# Patient Record
Sex: FEMALE | Race: WHITE | Employment: UNEMPLOYED | URBAN - METROPOLITAN AREA
[De-identification: names, ages, dates, MRNs, and addresses within clinical notes are randomized per-mention and may not be internally consistent; named-entity substitution may affect disease eponyms.]

---

## 2017-03-13 ENCOUNTER — OFFICE VISIT (OUTPATIENT)
Dept: NEUROLOGY | Age: 67
End: 2017-03-13

## 2017-03-13 VITALS
HEIGHT: 59 IN | HEART RATE: 53 BPM | DIASTOLIC BLOOD PRESSURE: 68 MMHG | RESPIRATION RATE: 16 BRPM | BODY MASS INDEX: 34.68 KG/M2 | OXYGEN SATURATION: 98 % | WEIGHT: 172 LBS | SYSTOLIC BLOOD PRESSURE: 110 MMHG

## 2017-03-13 DIAGNOSIS — F79 INTELLECTUAL DISABILITY: ICD-10-CM

## 2017-03-13 DIAGNOSIS — R41.89 COGNITIVE IMPAIRMENT: Primary | ICD-10-CM

## 2017-03-13 DIAGNOSIS — R46.89 ABNORMAL BEHAVIOR: ICD-10-CM

## 2017-03-13 RX ORDER — DONEPEZIL HYDROCHLORIDE 5 MG/1
5 TABLET, FILM COATED ORAL
Qty: 30 TAB | Refills: 1 | Status: SHIPPED | OUTPATIENT
Start: 2017-03-13 | End: 2017-05-05 | Stop reason: SDUPTHER

## 2017-03-13 NOTE — PATIENT INSTRUCTIONS

## 2017-03-13 NOTE — PROGRESS NOTES
Chief Complaint   Patient presents with    Memory Loss         HISTORY OF PRESENT ILLNESS  Mario Hooper came back for follow-up. She was last seen in November 2015 for oral dyskinesias which were believed to be related to Risperdal use and have improved significantly since this was stopped . She has baseline developmental delay/cognitive impairment. She lives in an assisted living facility and needs assistance with most activities of daily living. She also has underlying anxiety. She takes  alprazolam 0.5 mg up to 3 times a day and also on Seroquel at bedtime. Her caregiver reports that for the past 3 weeks she has had behavioral issues. She will not follow instructions and will resist doing her routines. She also has been refusing to take medications during the day but will eventually take them in the afternoon. No fever, headache, cold symptoms or  urinary symptoms      Current Outpatient Prescriptions   Medication Sig    donepezil (ARICEPT) 5 mg tablet Take 1 Tab by mouth nightly.  alendronate (FOSAMAX) 70 mg tablet TAKE ONE TABLET WEEKLY ON FRIDAY 30 MINUTES BEFORE 1ST MEAL WITH FULLGLASS OF WATER. DO NOT LIE DOWN FOR 30 MINUTES AFTER TAKING    VITAMIN B-12 1,000 mcg tablet TAKE 1 TABLET BY MOUTH DAILY    QUEtiapine (SEROQUEL) 25 mg tablet Take  by mouth nightly.  ibuprofen (MOTRIN) 800 mg tablet Take 800 mg by mouth every eight (8) hours as needed for Pain.  FLUOXETINE HCL (FLUOXETINE PO) Take 60 mg by mouth.  esomeprazole (NEXIUM) 40 mg capsule TAKE (1) CAPSULE BY MOUTH DAILY    DOC-Q-LACE 100 mg capsule TAKE (1) CAPSULE BY MOUTH DAILY    loratadine (CLARITIN) 10 mg tablet TAKE 1 TABLET BY MOUTH DAILY FOR ALLERGIES    calcium carbonate (CALTREX) 600 mg (1,500 mg) tablet TAKE 1 TABLET BY MOUTH TWICE A DAY    ergocalciferol (ERGOCALCIFEROL) 50,000 unit capsule TAKE 1 CAPSULE ONCE A WEEK ON FRIDAY.     busPIRone (BUSPAR) 15 mg tablet Take 15 mg by mouth three (3) times daily.    clotrimazole (LOTRIMIN) 1 % topical cream Apply  to affected area two (2) times daily as needed.  ketoconazole (NIZORAL) 2 % topical cream Apply  to affected area two (2) times daily as needed for Skin Irritation.  polyethylene glycol (MIRALAX) 17 gram/dose powder MIX 1 CAPFUL (=17GM) IN A GLASS OF WATER & DRINK ONCE DAILY AS NEEDEDFOR CONSTIPATION    buPROPion XL (WELLBUTRIN XL) 300 mg XL tablet Take 300 mg by mouth.  ALPRAZolam (XANAX) 0.25 mg tablet Take 0.25 mg by mouth two (2) times a day.  Fiber (FIBER CHOICE) Chew Take 1 Tab by mouth two (2) times a day. No current facility-administered medications for this visit. PHYSICAL EXAMINATION:    Visit Vitals    /68    Pulse (!) 53    Resp 16    Ht 4' 11\" (1.499 m)    Wt 78 kg (172 lb)    SpO2 98%    BMI 34.74 kg/m2       NEUROLOGICAL EXAMINATION:     Mental Status:   Alert and oriented to person, place. She does quite poorly on cognitive assessment and has difficulties with visuospatial, executive function, memory, abstraction and orientation. Cranial Nerves:    II, III, IV, VI:  Visual acuity grossly intact. Visual fields are normal.    Pupils are equal, round, and reactive to light and accommodation. Extra-ocular movements are full and fluid. Fundoscopic exam was benign, no ptosis or nystagmus. V-XII: Hearing is grossly intact. Facial features are symmetric, with normal sensation and strength. The palate rises symmetrically and the tongue protrudes midline. Sternocleidomastoids 5/5. Motor Examination: Normal tone, bulk, and strength. 5/5 muscle strength throughout. No cogwheel rigidity or clonus present. Sensory exam:  Normal throughout to pinprick, temperature, and vibration sense. Normal proprioception. Coordination:  Heel-to-shin was smooth and symmetrical bilaterally.   Finger to nose and rapid arm movement testing was normal.   No resting or intention tremor    Gait and Station: Steady while walking on toes, heels, and with tandem walking. Normal arm swing. No Rhomberg or pronator drift. No muscle wasting or fasiculations noted. Reflexes:  DTRs 2+ throughout. Toes downgoing. LABS / IMAGING  Lab Results   Component Value Date/Time    TSH 1.170 12/04/2015 10:23 AM     Lab Results   Component Value Date/Time    Vitamin B12 993 11/17/2016 08:48 AM    Folate 17.7 12/04/2015 10:10 AM     CT Results (most recent):    Results from [place] encounter on 10/13/15   CT HEAD WO CONT   Narrative **Final Report**      ICD Codes / Adm. Diagnosis: 719.46  M25.561 / Unspecified psychosis    Disorientation, unspecified  Examination:  CT HEAD WO CON  - [id number] - Oct 13 2015  1:48PM  Accession No:  [id number]  Reason:  Confusion, frequent falls      REPORT:  Medical indication: Fall off bed yesterday, mouth bleeding both knee   bleeding acute pain . Axial CT scan of the brain obtained without intravenous contrast, comparison   to 2008 . There is no extra-axial fluid collection hemorrhage shift or masses. Ventricles are normal in size. IMPRESSION: No acute changes. Signing/Reading Doctor: Ollie Delacruz ([id number])    Approved: Ollie Delacruz ([id number])  Oct 13 2015  2:18PM                                     ASSESSMENT    ICD-10-CM ICD-9-CM    1. Cognitive impairment R41.89 294.9 donepezil (ARICEPT) 5 mg tablet   2. Abnormal behavior R46.89 796.4 URINALYSIS W/ RFLX MICROSCOPIC   3. Intellectual disability F79 319        DISCUSSION  Ms. Svetlana Marquez has baseline cognitive impairment to a moderate degree. It would be difficult to exclude a coexistent and progressive neurodegenerative condition. We will need to follow her clinically for any changes. She does quite poorly on [place] cognitive assessment.    We will try her on donepezil see if it helps her behavioral symptoms  If not, she will need counseling/behavioral therapy  Check UA to rule out any infection that could be decompensating her    Nick Wilkinson MD  Diplomate, American Board of Psychiatry & Neurology (Neurology)  [place] Board of Psychiatry & Neurology (Clinical Neurophysiology)  Diplomate, American Board of Electrodiagnostic Medicine

## 2017-03-13 NOTE — MR AVS SNAPSHOT
Visit Information Date & Time Provider Department Dept. Phone Encounter #  
 3/13/2017 11:15 AM Latoya Brambila MD [facility] [id number] Follow-up Instructions Return in about 3 months (around 6/13/2017). Your Appointments 5/9/2017  9:45 AM  
ROUTINE CARE with Guera Spencer MD  
[facility] [address]) Appt Note: 6m fuv  
 [address] [facility] [address]  
[address] [address]  
  
    
 8/8/2017  2:00 PM  
Medicare Physical with Guera Spencer MD  
[facility] [address] [place] [address] Road) Appt Note: medicare wellness [address] [facility] [address]  
[address] [place] [address] Upcoming Health Maintenance Date Due  
 BREAST CANCER SCRN MAMMOGRAM 4/19/2017 GLAUCOMA SCREENING Q2Y 5/6/2017 MEDICARE YEARLY EXAM 11/9/2017 COLONOSCOPY 3/5/2018 DTaP/Tdap/Td series (2 - Td) 7/14/2019 Allergies as of 3/13/2017  Review Complete On: 3/13/2017 By: Latoya Brambila MD  
 No Known Allergies Current Immunizations  Reviewed on 8/5/2016 Name Date Influenza High Dose Vaccine PF 8/31/2015 Influenza Vaccine 10/27/2014, 10/16/2013 Influenza Vaccine Split 10/1/2011 Influenza Vaccine Whole 1/1/2005 PPD 6/23/2008 Pneumococcal Conjugate (PCV-13) 8/31/2015 Pneumococcal Polysaccharide (PPSV-23) 11/8/2016 TB Skin Test (PPD) Intradermal 8/28/2015 TD Vaccine 1/1/1999 TDAP Vaccine 7/14/2009 Zoster 11/12/2012 Not reviewed this visit You Were Diagnosed With   
  
 Codes Comments Cognitive impairment    -  Primary ICD-10-CM: R41.89 ICD-9-CM: 294.9 Abnormal behavior     ICD-10-CM: R46.89 
ICD-9-CM: 796.4 Intellectual disability     ICD-10-CM: F79 
ICD-9-CM: 413 Vitals BP Pulse Resp Height(growth percentile) Weight(growth percentile) SpO2  
 110/68 (!) 53 16 4' 11\" (1.499 m) 172 lb (78 kg) 98% BMI OB Status Smoking Status 34.74 kg/m2 Postmenopausal Never Smoker Vitals History BMI and BSA Data Body Mass Index Body Surface Area 34.74 kg/m 2 1.8 m 2 Preferred Pharmacy Pharmacy Name Phone [facility], [address] [phone] Your Updated Medication List  
  
   
This list is accurate as of: 3/13/17 11:53 AM.  Always use your most recent med list.  
  
  
  
  
 alendronate 70 mg tablet Commonly known as:  FOSAMAX TAKE ONE TABLET WEEKLY ON FRIDAY 30 MINUTES BEFORE 1ST MEAL WITH FULLGLASS OF WATER. DO NOT LIE DOWN FOR 30 MINUTES AFTER TAKING  
  
 buPROPion  mg XL tablet Commonly known as:  [place] Take 300 mg by mouth.  
  
 busPIRone 15 mg tablet Commonly known as:  BUSPAR Take 15 mg by mouth three (3) times daily. calcium carbonate 600 mg calcium (1,500 mg) tablet Commonly known as:  CALTREX  
TAKE 1 TABLET BY MOUTH TWICE A DAY clotrimazole 1 % topical cream  
Commonly known as:  Claudene William Apply  to affected area two (2) times daily as needed. DOC-Q-LACE 100 mg capsule Generic drug:  docusate sodium TAKE (1) CAPSULE BY MOUTH DAILY  
  
 donepezil 5 mg tablet Commonly known as:  ARICEPT Take 1 Tab by mouth nightly.  
  
 ergocalciferol 50,000 unit capsule Commonly known as:  ERGOCALCIFEROL  
TAKE 1 CAPSULE ONCE A WEEK ON FRIDAY. esomeprazole 40 mg capsule Commonly known as:  NEXIUM  
TAKE (1) CAPSULE BY MOUTH DAILY FIBER CHOICE Chew Generic drug:  Fiber Take 1 Tab by mouth two (2) times a day. FLUOXETINE PO Take 60 mg by mouth. ibuprofen 800 mg tablet Commonly known as:  MOTRIN Take 800 mg by mouth every eight (8) hours as needed for Pain.  
  
 ketoconazole 2 % topical cream  
 Commonly known as:  NIZORAL Apply  to affected area two (2) times daily as needed for Skin Irritation. loratadine 10 mg tablet Commonly known as:  CLARITIN  
TAKE 1 TABLET BY MOUTH DAILY FOR ALLERGIES  
  
 polyethylene glycol 17 gram/dose powder Commonly known as:  Chaya  MIX 1 CAPFUL (=17GM) IN A GLASS OF WATER & DRINK ONCE DAILY AS NEEDEDFOR CONSTIPATION  
  
 SEROquel 25 mg tablet Generic drug:  QUEtiapine Take  by mouth nightly. VITAMIN B-12 1,000 mcg tablet Generic drug:  cyanocobalamin TAKE 1 TABLET BY MOUTH DAILY  
  
 XANAX 0.25 mg tablet Generic drug:  ALPRAZolam  
Take 0.25 mg by mouth two (2) times a day. Prescriptions Sent to Pharmacy Refills  
 donepezil (ARICEPT) 5 mg tablet 1 Sig: Take 1 Tab by mouth nightly. Class: Normal  
 Pharmacy: [address] [place] [address] #: [phone] Route: Oral  
  
We Performed the Following URINALYSIS W/ RFLX MICROSCOPIC [43885 CPT(R)] Follow-up Instructions Return in about 3 months (around 6/13/2017). Patient Instructions A Healthy Lifestyle: Care Instructions Your Care Instructions A healthy lifestyle can help you feel good, stay at a healthy weight, and have plenty of energy for both work and play. A healthy lifestyle is something you can share with your whole family. A healthy lifestyle also can lower your risk for serious health problems, such as high blood pressure, heart disease, and diabetes. You can follow a few steps listed below to improve your health and the health of your family. Follow-up care is a key part of your treatment and safety. Be sure to make and go to all appointments, and call your doctor if you are having problems. Its also a good idea to know your test results and keep a list of the medicines you take. How can you care for yourself at home? · Do not eat too much sugar, fat, or fast foods. You can still have dessert and treats now and then. The goal is moderation. · Start small to improve your eating habits. Pay attention to portion sizes, drink less juice and soda pop, and eat more fruits and vegetables. ¨ Eat a healthy amount of food. A 3-ounce serving of meat, for example, is about the size of a deck of cards. Fill the rest of your plate with vegetables and whole grains. ¨ Limit the amount of soda and sports drinks you have every day. Drink more water when you are thirsty. ¨ Eat at least 5 servings of fruits and vegetables every day. It may seem like a lot, but it is not hard to reach this goal. A serving or helping is 1 piece of fruit, 1 cup of vegetables, or 2 cups of leafy, raw vegetables. Have an apple or some carrot sticks as an afternoon snack instead of a candy bar. Try to have fruits and/or vegetables at every meal. 
· Make exercise part of your daily routine. You may want to start with simple activities, such as walking, bicycling, or slow swimming. Try to be active 30 to 60 minutes every day. You do not need to do all 30 to 60 minutes all at once. For example, you can exercise 3 times a day for 10 or 20 minutes. Moderate exercise is safe for most people, but it is always a good idea to talk to your doctor before starting an exercise program. 
· Keep moving. Trevor Lee the lawn, work in the garden, or [place]. Take the stairs instead of the elevator at work. · If you smoke, quit. People who smoke have an increased risk for heart attack, stroke, cancer, and other lung illnesses. Quitting is hard, but there are ways to boost your chance of quitting tobacco for good. ¨ Use nicotine gum, patches, or lozenges. ¨ Ask your doctor about stop-smoking programs and medicines. ¨ Keep trying.  
In addition to reducing your risk of diseases in the future, you will notice some benefits soon after you stop using tobacco. If you have shortness of breath or asthma symptoms, they will likely get better within a few weeks after you quit. · Limit how much alcohol you drink. Moderate amounts of alcohol (up to 2 drinks a day for men, 1 drink a day for women) are okay. But drinking too much can lead to liver problems, high blood pressure, and other health problems. Family health If you have a family, there are many things you can do together to improve your health. · Eat meals together as a family as often as possible. · Eat healthy foods. This includes fruits, vegetables, lean meats and dairy, and whole grains. · Include your family in your fitness plan. Most people think of activities such as jogging or tennis as the way to fitness, but there are many ways you and your family can be more active. Anything that makes you breathe hard and gets your heart pumping is exercise. Here are some tips: 
¨ Walk to do errands or to take your child to school or the bus. ¨ Go for a family bike ride after dinner instead of watching TV. Where can you learn more? Go to http://shefali-kayley.info/. Enter O194 in the search box to learn more about \"A Healthy Lifestyle: Care Instructions. \" Current as of: July 26, 2016 Content Version: 11.1 © 8527-0499 [employer]. Care instructions adapted under license by [employer] (which disclaims liability or warranty for this information). If you have questions about a medical condition or this instruction, always ask your healthcare professional. [place] [address] any warranty or liability for your use of this information. Introducing hospitals & HEALTH SERVICES! [employer] introduces Dick or Bro patient portal. Now you can access parts of your medical record, email your doctor's office, and request medication refills online. 1. In your internet browser, go to [email or web address] 2. Click on the First Time User? Click Here link in the Sign In box. You will see the New Member Sign Up page. 3. Enter your Seedcamp Access Code exactly as it appears below. You will not need to use this code after youve completed the sign-up process. If you do not sign up before the expiration date, you must request a new code. · Seedcamp Access Code: [email or web address] Expires: 6/11/2017 11:19 AM 
 
4. Enter the last four digits of your Social Security Number (xxxx) and Date of Birth (mm/dd/yyyy) as indicated and click Submit. You will be taken to the next sign-up page. 5. Create a Seedcamp ID. This will be your Seedcamp login ID and cannot be changed, so think of one that is secure and easy to remember. 6. Create a Seedcamp password. You can change your password at any time. 7. Enter your Password Reset Question and Answer. This can be used at a later time if you forget your password. 8. Enter your e-mail address. You will receive e-mail notification when new information is available in [address] 9. Click Sign Up. You can now view and download portions of your medical record. 10. Click the Download Summary menu link to download a portable copy of your medical information. If you have questions, please visit the Frequently Asked Questions section of the Seedcamp website. Remember, Seedcamp is NOT to be used for urgent needs. For medical emergencies, dial 911. Now available from your iPhone and Android! Please provide this summary of care documentation to your next provider. Your primary care clinician is listed as Nola Victoria. If you have any questions after today's visit, please call [phone].

## 2017-03-16 LAB
APPEARANCE UR: CLEAR
BILIRUB UR QL STRIP: NEGATIVE
COLOR UR: YELLOW
GLUCOSE UR QL: NEGATIVE
HGB UR QL STRIP: NEGATIVE
KETONES UR QL STRIP: NEGATIVE
LEUKOCYTE ESTERASE UR QL STRIP: NEGATIVE
MICRO URNS: NORMAL
NITRITE UR QL STRIP: NEGATIVE
PH UR STRIP: 6 [PH] (ref 5–7.5)
PROT UR QL STRIP: NEGATIVE
SP GR UR: 1.02 (ref 1–1.03)
UROBILINOGEN UR STRIP-MCNC: 1 MG/DL (ref 0.2–1)

## 2017-03-27 ENCOUNTER — TELEPHONE (OUTPATIENT)
Dept: NEUROLOGY | Age: 67
End: 2017-03-27

## 2017-03-30 ENCOUNTER — TELEPHONE (OUTPATIENT)
Dept: NEUROLOGY | Age: 67
End: 2017-03-30

## 2017-04-14 ENCOUNTER — HOSPITAL ENCOUNTER (OUTPATIENT)
Dept: LAB | Age: 67
Discharge: HOME | End: 2017-04-14
Payer: MEDICARE

## 2017-04-14 ENCOUNTER — OFFICE VISIT (OUTPATIENT)
Dept: INTERNAL MEDICINE CLINIC | Age: 67
End: 2017-04-14

## 2017-04-14 VITALS
SYSTOLIC BLOOD PRESSURE: 122 MMHG | WEIGHT: 173.4 LBS | BODY MASS INDEX: 34.96 KG/M2 | OXYGEN SATURATION: 97 % | HEIGHT: 59 IN | RESPIRATION RATE: 16 BRPM | DIASTOLIC BLOOD PRESSURE: 82 MMHG | HEART RATE: 55 BPM

## 2017-04-14 DIAGNOSIS — R32 URINARY INCONTINENCE, UNSPECIFIED TYPE: ICD-10-CM

## 2017-04-14 DIAGNOSIS — R41.89 COGNITIVE AND BEHAVIORAL CHANGES: ICD-10-CM

## 2017-04-14 DIAGNOSIS — F41.9 ANXIETY AND DEPRESSION: ICD-10-CM

## 2017-04-14 DIAGNOSIS — R10.9 ABDOMINAL PAIN, UNSPECIFIED LOCATION: Primary | ICD-10-CM

## 2017-04-14 DIAGNOSIS — R46.89 COGNITIVE AND BEHAVIORAL CHANGES: ICD-10-CM

## 2017-04-14 DIAGNOSIS — R19.7 DIARRHEA, UNSPECIFIED TYPE: ICD-10-CM

## 2017-04-14 DIAGNOSIS — F32.A ANXIETY AND DEPRESSION: ICD-10-CM

## 2017-04-14 LAB
BILIRUB UR QL STRIP: NEGATIVE
GLUCOSE UR-MCNC: NEGATIVE MG/DL
KETONES P FAST UR STRIP-MCNC: NEGATIVE MG/DL
PH UR STRIP: 6.5 [PH] (ref 4.6–8)
PROT UR QL STRIP: NEGATIVE MG/DL
SP GR UR STRIP: 1.01 (ref 1–1.03)
UA UROBILINOGEN AMB POC: NORMAL (ref 0.2–1)
URINALYSIS CLARITY POC: CLEAR
URINALYSIS COLOR POC: YELLOW
URINE BLOOD POC: NORMAL
URINE LEUKOCYTES POC: NORMAL
URINE NITRITES POC: NEGATIVE

## 2017-04-14 PROCEDURE — 84439 ASSAY OF FREE THYROXINE: CPT

## 2017-04-14 PROCEDURE — 36415 COLL VENOUS BLD VENIPUNCTURE: CPT

## 2017-04-14 PROCEDURE — 87086 URINE CULTURE/COLONY COUNT: CPT

## 2017-04-14 PROCEDURE — 85025 COMPLETE CBC W/AUTO DIFF WBC: CPT

## 2017-04-14 PROCEDURE — 84443 ASSAY THYROID STIM HORMONE: CPT

## 2017-04-14 PROCEDURE — 80053 COMPREHEN METABOLIC PANEL: CPT

## 2017-04-14 PROCEDURE — 81001 URINALYSIS AUTO W/SCOPE: CPT

## 2017-04-14 NOTE — PROGRESS NOTES
HISTORY OF PRESENT ILLNESS  Lucia Hamilton is a 78 y.o. female.   HPI    ROS    Physical Exam    ASSESSMENT and PLAN  {ASSESSMENT/PLAN:92810}
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
## 2017-04-14 NOTE — PROGRESS NOTES
Chief Complaint   Patient presents with    Incontinence     bowel and bladder     Michelle with [facility] case management with patient, Gucci Casa may be reached at [phone].

## 2017-04-15 LAB
ALBUMIN SERPL-MCNC: 4 G/DL (ref 3.6–4.8)
ALBUMIN/GLOB SERPL: 1.9 {RATIO} (ref 1.2–2.2)
ALP SERPL-CCNC: 87 IU/L (ref 39–117)
ALT SERPL-CCNC: 17 IU/L (ref 0–32)
AST SERPL-CCNC: 16 IU/L (ref 0–40)
BASOPHILS # BLD AUTO: 0 X10E3/UL (ref 0–0.2)
BASOPHILS NFR BLD AUTO: 1 %
BILIRUB SERPL-MCNC: 0.3 MG/DL (ref 0–1.2)
BUN SERPL-MCNC: 15 MG/DL (ref 8–27)
BUN/CREAT SERPL: 17 (ref 12–28)
CALCIUM SERPL-MCNC: 8.9 MG/DL (ref 8.7–10.3)
CHLORIDE SERPL-SCNC: 101 MMOL/L (ref 96–106)
CO2 SERPL-SCNC: 25 MMOL/L (ref 18–29)
CREAT SERPL-MCNC: 0.87 MG/DL (ref 0.57–1)
EOSINOPHIL # BLD AUTO: 0.1 X10E3/UL (ref 0–0.4)
EOSINOPHIL NFR BLD AUTO: 2 %
ERYTHROCYTE [DISTWIDTH] IN BLOOD BY AUTOMATED COUNT: 14.5 % (ref 12.3–15.4)
GLOBULIN SER CALC-MCNC: 2.1 G/DL (ref 1.5–4.5)
GLUCOSE SERPL-MCNC: 81 MG/DL (ref 65–99)
HCT VFR BLD AUTO: 37.4 % (ref 34–46.6)
HGB BLD-MCNC: 12 G/DL (ref 11.1–15.9)
IMM GRANULOCYTES # BLD: 0 X10E3/UL (ref 0–0.1)
IMM GRANULOCYTES NFR BLD: 0 %
LYMPHOCYTES # BLD AUTO: 1.5 X10E3/UL (ref 0.7–3.1)
LYMPHOCYTES NFR BLD AUTO: 32 %
MCH RBC QN AUTO: 29 PG (ref 26.6–33)
MCHC RBC AUTO-ENTMCNC: 32.1 G/DL (ref 31.5–35.7)
MCV RBC AUTO: 90 FL (ref 79–97)
MONOCYTES # BLD AUTO: 0.4 X10E3/UL (ref 0.1–0.9)
MONOCYTES NFR BLD AUTO: 8 %
NEUTROPHILS # BLD AUTO: 2.7 X10E3/UL (ref 1.4–7)
NEUTROPHILS NFR BLD AUTO: 57 %
PLATELET # BLD AUTO: 134 X10E3/UL (ref 150–379)
POTASSIUM SERPL-SCNC: 4.5 MMOL/L (ref 3.5–5.2)
PROT SERPL-MCNC: 6.1 G/DL (ref 6–8.5)
RBC # BLD AUTO: 4.14 X10E6/UL (ref 3.77–5.28)
SODIUM SERPL-SCNC: 141 MMOL/L (ref 134–144)
T4 FREE SERPL-MCNC: 1.01 NG/DL (ref 0.82–1.77)
TSH SERPL DL<=0.005 MIU/L-ACNC: 1.77 UIU/ML (ref 0.45–4.5)
WBC # BLD AUTO: 4.7 X10E3/UL (ref 3.4–10.8)

## 2017-04-16 LAB
APPEARANCE UR: CLEAR
BACTERIA #/AREA URNS HPF: NORMAL /[HPF]
BACTERIA UR CULT: NORMAL
BILIRUB UR QL STRIP: NEGATIVE
CASTS URNS QL MICRO: NORMAL /LPF
COLOR UR: YELLOW
EPI CELLS #/AREA URNS HPF: NORMAL /HPF
GLUCOSE UR QL: NEGATIVE
HGB UR QL STRIP: NEGATIVE
KETONES UR QL STRIP: NEGATIVE
LEUKOCYTE ESTERASE UR QL STRIP: ABNORMAL
MICRO URNS: ABNORMAL
MUCOUS THREADS URNS QL MICRO: PRESENT
NITRITE UR QL STRIP: NEGATIVE
PH UR STRIP: 6.5 [PH] (ref 5–7.5)
PROT UR QL STRIP: NEGATIVE
RBC #/AREA URNS HPF: NORMAL /HPF
SP GR UR: 1.01 (ref 1–1.03)
URINALYSIS REFLEX, 377202: ABNORMAL
UROBILINOGEN UR STRIP-MCNC: 0.2 MG/DL (ref 0.2–1)
WBC #/AREA URNS HPF: NORMAL /HPF

## 2017-04-18 ENCOUNTER — TELEPHONE (OUTPATIENT)
Dept: INTERNAL MEDICINE CLINIC | Age: 67
End: 2017-04-18

## 2017-04-26 ENCOUNTER — TELEPHONE (OUTPATIENT)
Dept: INTERNAL MEDICINE CLINIC | Age: 67
End: 2017-04-26

## 2017-04-26 DIAGNOSIS — E78.00 HYPERCHOLESTEREMIA: Primary | ICD-10-CM

## 2017-04-26 NOTE — TELEPHONE ENCOUNTER
Elliott 219 from [facility] who is calling on pt behalf. States family is requesting labs be done again on the pt. States the pt was not fasting when pt had labs done previously and diabetes runs in the family and that she has been having behavioral problems. Also they are requesting and order to discontinue the stool softener, since pt was having diarrhea.

## 2017-04-26 NOTE — TELEPHONE ENCOUNTER
Jitendra Jean-Baptiste () [phone]     Requesting a call back regarding order to DC stool softner, also need copy of labs done on the 14th, and would like to discuss pt having fasting labs done.

## 2017-04-27 ENCOUNTER — TELEPHONE (OUTPATIENT)
Dept: INTERNAL MEDICINE CLINIC | Age: 67
End: 2017-04-27

## 2017-04-27 NOTE — TELEPHONE ENCOUNTER
Give another order to stop colace. This was done on the there form she brought at time of her visit and copy was given back to caretaker. Not sure why they are asking for this again. Recommend family come to pt next appt  Notify that her blood sugar was normal and does not need to be repeated. Lab order for cholesterol panel ordered and they can bring her fasting for this lab work. Nothing about her lab work indicates any reason for behavioral changes.

## 2017-04-27 NOTE — TELEPHONE ENCOUNTER
Spoke with Michael of [facility] where pt resides and gave her instructions for labs and for family to come for next visit. Order to discontinue colace per Dr Yaquelin Miramontes and lab slip faxed to [facility] at [phone].

## 2017-04-27 NOTE — TELEPHONE ENCOUNTER
Patient is seeing GI - Dr. Galina Moore on May 9th @ 12:15. They are requesting office notes and labs from the past year.   Fax # [phone]

## 2017-05-05 ENCOUNTER — TELEPHONE (OUTPATIENT)
Dept: NEUROLOGY | Age: 67
End: 2017-05-05

## 2017-05-05 RX ORDER — DONEPEZIL HYDROCHLORIDE 5 MG/1
TABLET, FILM COATED ORAL
Qty: 30 TAB | Refills: 0 | Status: SHIPPED | OUTPATIENT
Start: 2017-05-05 | End: 2017-07-13 | Stop reason: ALTCHOICE

## 2017-05-05 NOTE — TELEPHONE ENCOUNTER
----- Message from Reinier Boyer MD sent at 5/5/2017  8:21 AM EDT -----  Regarding: RE: Dr. Lisa Carmichael Telephone  Donepezil can cause diarrhea abdominal cramps. If she is still experiencing it, she may discontinue the medication.     ----- Message -----     From: Kalyn Alvarez     Sent: 5/3/2017   2:47 PM       To: Reinier Boyer MD  Subject: FW: Dr. Jose Manuel Jefferson with [facility]. Patient experiencing \"bowel issues\" since starting medication. Please advise.  ----- Message -----     From: Reji Melvin     Sent: 5/3/2017   2:14 PM       To: Kalyn Alvarez  Subject: FW: Dr. Lisa Carmichael Telephone                            ----- Message -----     From: Kalli Jones     Sent: 5/3/2017   1:36 PM       To: [facility] Office  Subject: Dr. Ladan Griffith from [employer] would like a callback to discuss pt. Ihsan (C) [phone].

## 2017-05-16 ENCOUNTER — OFFICE VISIT (OUTPATIENT)
Dept: INTERNAL MEDICINE CLINIC | Age: 67
End: 2017-05-16

## 2017-05-16 VITALS
HEIGHT: 59 IN | WEIGHT: 170 LBS | OXYGEN SATURATION: 97 % | DIASTOLIC BLOOD PRESSURE: 70 MMHG | TEMPERATURE: 97.9 F | BODY MASS INDEX: 34.27 KG/M2 | SYSTOLIC BLOOD PRESSURE: 110 MMHG | HEART RATE: 64 BPM | RESPIRATION RATE: 16 BRPM

## 2017-05-16 DIAGNOSIS — K59.00 CONSTIPATION, UNSPECIFIED CONSTIPATION TYPE: Primary | ICD-10-CM

## 2017-05-16 RX ORDER — DOCUSATE SODIUM 100 MG/1
100 CAPSULE, LIQUID FILLED ORAL DAILY
Qty: 30 CAP | Refills: 6 | Status: SHIPPED | OUTPATIENT
Start: 2017-05-16 | End: 2017-12-12 | Stop reason: SDUPTHER

## 2017-05-16 NOTE — PROGRESS NOTES
HPI:  Yakov Lima is a 78y.o. year old female who returns to clinic today for follow up: abdominal pain and behavioral concerns. She is doing much better and was seen by GI who treated her for impaction with enemas and oral regimen and with resolution of impaction she is no longer having any abdominal pain. She states she is happy where she is living and does not feel anyone is trying to harm her. She is accompanied by her  and family member. Reviewed recent lab work. Current Outpatient Prescriptions   Medication Sig Dispense Refill    donepezil (ARICEPT) 5 mg tablet Discontinue the medication 30 Tab 0    alendronate (FOSAMAX) 70 mg tablet TAKE ONE TABLET WEEKLY ON FRIDAY 30 MINUTES BEFORE 1ST MEAL WITH FULLGLASS OF WATER. DO NOT LIE DOWN FOR 30 MINUTES AFTER TAKING 4 Tab 5    QUEtiapine (SEROQUEL) 25 mg tablet Take  by mouth nightly.  ibuprofen (MOTRIN) 800 mg tablet Take 800 mg by mouth every eight (8) hours as needed for Pain.  FLUOXETINE HCL (FLUOXETINE PO) Take 60 mg by mouth.  esomeprazole (NEXIUM) 40 mg capsule TAKE (1) CAPSULE BY MOUTH DAILY 31 Cap PRN    DOC-Q-LACE 100 mg capsule TAKE (1) CAPSULE BY MOUTH DAILY 31 Cap PRN    loratadine (CLARITIN) 10 mg tablet TAKE 1 TABLET BY MOUTH DAILY FOR ALLERGIES 31 Tab PRN    calcium carbonate (CALTREX) 600 mg (1,500 mg) tablet TAKE 1 TABLET BY MOUTH TWICE A DAY 62 Tab PRN    ergocalciferol (ERGOCALCIFEROL) 50,000 unit capsule TAKE 1 CAPSULE ONCE A WEEK ON FRIDAY. 4 Cap PRN    busPIRone (BUSPAR) 15 mg tablet Take 15 mg by mouth three (3) times daily.  clotrimazole (LOTRIMIN) 1 % topical cream Apply  to affected area two (2) times daily as needed.  ketoconazole (NIZORAL) 2 % topical cream Apply  to affected area two (2) times daily as needed for Skin Irritation.  15 g 0    polyethylene glycol (MIRALAX) 17 gram/dose powder MIX 1 CAPFUL (=17GM) IN A GLASS OF WATER & DRINK ONCE DAILY AS NEEDEDFOR CONSTIPATION 527 g 0    buPROPion XL (WELLBUTRIN XL) 300 mg XL tablet Take 300 mg by mouth.  ALPRAZolam (XANAX) 0.25 mg tablet Take 0.25 mg by mouth two (2) times a day.  Fiber (FIBER CHOICE) Chew Take 1 Tab by mouth two (2) times a day.  VITAMIN B-12 1,000 mcg tablet TAKE 1 TABLET BY MOUTH DAILY 30 Tab PRN     No Known Allergies  Social History   Substance Use Topics    Smoking status: Never Smoker    Smokeless tobacco: Never Used    Alcohol use No         Review of Systems   Respiratory: Negative for shortness of breath. Cardiovascular: Negative for chest pain and leg swelling. Gastrointestinal: Negative for abdominal pain, constipation, diarrhea, heartburn, nausea and vomiting. Psychiatric/Behavioral: Negative for depression. The patient is not nervous/anxious. Physical Exam   Constitutional: She appears well-developed. No distress. /70  Pulse 64  Temp 97.9 °F (36.6 °C)  Resp 16  Ht 4' 11\" (1.499 m)  Wt 170 lb (77.1 kg)  SpO2 97%  BMI 34.34 kg/m2   Cardiovascular: Normal rate, regular rhythm, normal heart sounds and intact distal pulses. No murmur heard. Pulmonary/Chest: Effort normal and breath sounds normal. She has no wheezes. Abdominal: Soft. Bowel sounds are normal. She exhibits no distension and no mass. There is no tenderness. Musculoskeletal: She exhibits no edema. Psychiatric: She has a normal mood and affect. Vitals reviewed. Assessment & Plan:    ICD-10-CM ICD-9-CM    1. Constipation, unspecified constipation type K59.00 564.00    continue current bowel regimen. Follow-up Disposition:  Return for keep august appt. Advised her to call back or return to office if symptoms worsen/change/persist.  Discussed expected course/resolution/complications of diagnosis in detail with patient. Medication risks/benefits/costs/interactions/alternatives discussed with patient.   She was given an after visit summary which includes diagnoses, current medications, & vitals. She expressed understanding with the diagnosis and plan.

## 2017-05-16 NOTE — PROGRESS NOTES
Chief Complaint   Patient presents with    Follow-up     Pt has a productive cough with a sore throat

## 2017-05-16 NOTE — MR AVS SNAPSHOT
Visit Information Date & Time Provider Department Dept. Phone Encounter #  
 5/16/2017  2:45 PM Florentin Vance, [address] [facility] [phone] [id number] Your Appointments 6/27/2017 11:00 AM  
Follow Up with Daryle Reek, MD  
[facility] Appt Note: 3 month f/u mood and memory changes KU 3/13  
 [address] [facility] [address]  
[address] [address]  
  
    
 8/8/2017  2:00 PM  
Medicare Physical with Florentin Vance MD  
[facility]) Appt Note: medicare wellness [address] [facility] [address]  
[address] [address] [address] [place] Upcoming Health Maintenance Date Due  
 BREAST CANCER SCRN MAMMOGRAM 4/19/2017 GLAUCOMA SCREENING Q2Y 5/6/2017 INFLUENZA AGE 9 TO ADULT 8/1/2017 MEDICARE YEARLY EXAM 11/9/2017 COLONOSCOPY 3/5/2018 DTaP/Tdap/Td series (2 - Td) 7/14/2019 Allergies as of 5/16/2017  Review Complete On: 5/16/2017 By: Florentin aVnce MD  
 No Known Allergies Current Immunizations  Reviewed on 8/5/2016 Name Date Influenza High Dose Vaccine PF 8/31/2015 Influenza Vaccine 10/27/2014, 10/16/2013 Influenza Vaccine Split 10/1/2011 Influenza Vaccine Whole 1/1/2005 PPD 6/23/2008 Pneumococcal Conjugate (PCV-13) 8/31/2015 Pneumococcal Polysaccharide (PPSV-23) 11/8/2016 TB Skin Test (PPD) Intradermal 8/28/2015 TD Vaccine 1/1/1999 TDAP Vaccine 7/14/2009 Zoster 11/12/2012 Not reviewed this visit You Were Diagnosed With   
  
 Codes Comments Constipation, unspecified constipation type    -  Primary ICD-10-CM: K59.00 ICD-9-CM: 564.00 Vitals BP Pulse Temp Resp Height(growth percentile) Weight(growth percentile) 110/70 64 97.9 °F (36.6 °C) 16 4' 11\" (1.499 m) 170 lb (77.1 kg) SpO2 BMI OB Status Smoking Status 97% 34.34 kg/m2 Postmenopausal Never Smoker BMI and BSA Data Body Mass Index Body Surface Area  
 34.34 kg/m 2 1.79 m 2 Preferred Pharmacy Pharmacy Name Phone [facility], [address] [phone] Your Updated Medication List  
  
   
This list is accurate as of: 5/16/17  3:47 PM.  Always use your most recent med list.  
  
  
  
  
 alendronate 70 mg tablet Commonly known as:  FOSAMAX TAKE ONE TABLET WEEKLY ON FRIDAY 30 MINUTES BEFORE 1ST MEAL WITH FULLGLASS OF WATER. DO NOT LIE DOWN FOR 30 MINUTES AFTER TAKING  
  
 buPROPion  mg XL tablet Commonly known as:  Cloria Single Take 300 mg by mouth.  
  
 busPIRone 15 mg tablet Commonly known as:  BUSPAR Take 15 mg by mouth three (3) times daily. calcium carbonate 600 mg calcium (1,500 mg) tablet Commonly known as:  CALTREX  
TAKE 1 TABLET BY MOUTH TWICE A DAY clotrimazole 1 % topical cream  
Commonly known as:  Grahn Ganong Apply  to affected area two (2) times daily as needed. dextromethorphan-guaiFENesin  mg/5 mL syrup Commonly known as:  ROBITUSSIN-DM Take 10 mL by mouth every six (6) hours as needed for Cough. docusate sodium 100 mg capsule Commonly known as:  Lydia Oak Take 1 Cap by mouth daily. donepezil 5 mg tablet Commonly known as:  ARICEPT Discontinue the medication  
  
 ergocalciferol 50,000 unit capsule Commonly known as:  ERGOCALCIFEROL  
TAKE 1 CAPSULE ONCE A WEEK ON FRIDAY. esomeprazole 40 mg capsule Commonly known as:  NEXIUM  
TAKE (1) CAPSULE BY MOUTH DAILY FIBER CHOICE Chew Generic drug:  Fiber Take 1 Tab by mouth two (2) times a day. FLUOXETINE PO Take 60 mg by mouth. ibuprofen 800 mg tablet Commonly known as:  MOTRIN  
 Take 800 mg by mouth every eight (8) hours as needed for Pain.  
  
 ketoconazole 2 % topical cream  
Commonly known as:  NIZORAL Apply  to affected area two (2) times daily as needed for Skin Irritation. loratadine 10 mg tablet Commonly known as:  CLARITIN  
TAKE 1 TABLET BY MOUTH DAILY FOR ALLERGIES  
  
 polyethylene glycol 17 gram/dose powder Commonly known as:  Liv Limb MIX 1 CAPFUL (=17GM) IN A GLASS OF WATER & DRINK ONCE DAILY AS NEEDEDFOR CONSTIPATION  
  
 SEROquel 25 mg tablet Generic drug:  QUEtiapine Take  by mouth nightly. VITAMIN B-12 1,000 mcg tablet Generic drug:  cyanocobalamin TAKE 1 TABLET BY MOUTH DAILY  
  
 XANAX 0.25 mg tablet Generic drug:  ALPRAZolam  
Take 0.25 mg by mouth two (2) times a day. Prescriptions Printed Refills  
 docusate sodium (COLACE) 100 mg capsule 6 Sig: Take 1 Cap by mouth daily. Class: Print Route: Oral  
 dextromethorphan-guaiFENesin (ROBITUSSIN-DM)  mg/5 mL syrup 0 Sig: Take 10 mL by mouth every six (6) hours as needed for Cough. Class: Print Route: Oral  
  
Introducing [facility] & HEALTH SERVICES! Hernan Ridley introduces [employer] patient portal. Now you can access parts of your medical record, email your doctor's office, and request medication refills online. 1. In your internet browser, go to [email or web address] 2. Click on the First Time User? Click Here link in the Sign In box. You will see the New Member Sign Up page. 3. Enter your Ret[employer] Black Access Code exactly as it appears below. You will not need to use this code after youve completed the sign-up process. If you do not sign up before the expiration date, you must request a new code. · Ret[employer] Access Code: [email or web address] Expires: 6/11/2017 11:19 AM 
 
4. Enter the last four digits of your Social Security Number (xxxx) and Date of Birth (mm/dd/yyyy) as indicated and click Submit.  You will be taken to the next sign-up page. 5. Create a [employer] ID. This will be your [employer] login ID and cannot be changed, so think of one that is secure and easy to remember. 6. Create a [employer] password. You can change your password at any time. 7. Enter your Password Reset Question and Answer. This can be used at a later time if you forget your password. 8. Enter your e-mail address. You will receive e-mail notification when new information is available in [address] 9. Click Sign Up. You can now view and download portions of your medical record. 10. Click the Download Summary menu link to download a portable copy of your medical information. If you have questions, please visit the Frequently Asked Questions section of the [employer] website. Remember, [employer] is NOT to be used for urgent needs. For medical emergencies, dial 911. Now available from your iPhone and Android! Please provide this summary of care documentation to your next provider. Your primary care clinician is listed as Eric Loges. If you have any questions after today's visit, please call [phone].

## 2017-06-23 ENCOUNTER — TELEPHONE (OUTPATIENT)
Dept: INTERNAL MEDICINE CLINIC | Age: 67
End: 2017-06-23

## 2017-06-28 RX ORDER — LORATADINE 10 MG/1
TABLET ORAL
Qty: 31 TAB | Status: SHIPPED | OUTPATIENT
Start: 2017-06-28 | End: 2017-07-14 | Stop reason: SDUPTHER

## 2017-06-28 RX ORDER — ERGOCALCIFEROL 1.25 MG/1
CAPSULE ORAL
Qty: 4 CAP | Status: SHIPPED | OUTPATIENT
Start: 2017-06-28 | End: 2018-02-26

## 2017-06-28 RX ORDER — ALENDRONATE SODIUM 70 MG/1
TABLET ORAL
Qty: 4 TAB | Refills: 5 | Status: SHIPPED | OUTPATIENT
Start: 2017-06-28 | End: 2017-07-13 | Stop reason: SDUPTHER

## 2017-06-28 NOTE — TELEPHONE ENCOUNTER
NATALIE MENSAH [facility] [place] [address] [place] [address]         [facility] - [phone]    The home listed above was called and per [place] an updated signed rx was requested. Rx's printed and are pending Md's signature.

## 2017-07-13 ENCOUNTER — OFFICE VISIT (OUTPATIENT)
Dept: NEUROLOGY | Age: 67
End: 2017-07-13

## 2017-07-13 VITALS
HEIGHT: 59 IN | SYSTOLIC BLOOD PRESSURE: 122 MMHG | HEART RATE: 68 BPM | DIASTOLIC BLOOD PRESSURE: 78 MMHG | OXYGEN SATURATION: 98 % | WEIGHT: 172 LBS | RESPIRATION RATE: 14 BRPM | BODY MASS INDEX: 34.68 KG/M2

## 2017-07-13 DIAGNOSIS — F03.918 DEMENTIA, UNSPECIFIED, WITH BEHAVIORAL DISTURBANCE: ICD-10-CM

## 2017-07-13 DIAGNOSIS — R62.50 DEVELOPMENTAL DELAY: ICD-10-CM

## 2017-07-13 DIAGNOSIS — R25.9 ABNORMAL INVOLUNTARY MOVEMENTS: Primary | ICD-10-CM

## 2017-07-13 RX ORDER — MEMANTINE HYDROCHLORIDE 5 MG-10 MG
KIT ORAL
Qty: 30 TAB | Refills: 0 | Status: SHIPPED | OUTPATIENT
Start: 2017-07-13 | End: 2017-07-31 | Stop reason: SDUPTHER

## 2017-07-13 NOTE — MR AVS SNAPSHOT
Visit Information Date & Time Provider Department Dept. Phone Encounter #  
 7/13/2017 10:40 AM Aleksandar Myers MD [facility] 21  Follow-up Instructions Return in about 3 months (around 10/13/2017). Your Appointments 8/8/2017  2:00 PM  
Medicare Physical with Dyana Amaro MD  
[facility] [address]) Appt Note: medicare wellness [address] [facility] [address] [facility] [address]  
[address] [place] Upcoming Health Maintenance Date Due  
 GLAUCOMA SCREENING Q2Y 5/6/2017 INFLUENZA AGE 9 TO ADULT 8/1/2017 MEDICARE YEARLY EXAM 11/9/2017 COLONOSCOPY 3/5/2018 BREAST CANCER SCRN MAMMOGRAM 4/20/2018 DTaP/Tdap/Td series (2 - Td) 7/14/2019 Allergies as of 7/13/2017  Review Complete On: 7/13/2017 By: Aleksnadar Myers MD  
 No Known Allergies Current Immunizations  Reviewed on 8/5/2016 Name Date Influenza High Dose Vaccine PF 8/31/2015 Influenza Vaccine 10/27/2014, 10/16/2013 Influenza Vaccine Split 10/1/2011 Influenza Vaccine Whole 1/1/2005 PPD 6/23/2008 Pneumococcal Conjugate (PCV-13) 8/31/2015 Pneumococcal Polysaccharide (PPSV-23) 11/8/2016 TB Skin Test (PPD) Intradermal 8/28/2015 TD Vaccine 1/1/1999 TDAP Vaccine 7/14/2009 Zoster 11/12/2012 Not reviewed this visit You Were Diagnosed With   
  
 Codes Comments Abnormal involuntary movements    -  Primary ICD-10-CM: R25.9 ICD-9-CM: 781.0 Developmental delay     ICD-10-CM: R62.50 ICD-9-CM: 783.40 Dementia, unspecified, with behavioral disturbance     ICD-10-CM: F03.91 
ICD-9-CM: 294.21 Vitals BP Pulse Resp Height(growth percentile) Weight(growth percentile) SpO2  
 122/78 68 14 4' 11\" (1.499 m) 172 lb (78 kg) 98% BMI OB Status Smoking Status 34.74 kg/m2 Postmenopausal Never Smoker Vitals History BMI and BSA Data Body Mass Index Body Surface Area 34.74 kg/m 2 1.8 m 2 Preferred Pharmacy Pharmacy Name Phone [facility] Mathews, [address] [phone] Your Updated Medication List  
  
   
This list is accurate as of: 7/13/17 10:45 AM.  Always use your most recent med list.  
  
  
  
  
 alendronate 70 mg tablet Commonly known as:  FOSAMAX TAKE ONE TABLET WEEKLY ON FRIDAY 30 MINUTES BEFORE 1ST MEAL WITH FULLGLASS OF WATER. DO NOT LIE DOWN FOR 30 MINUTES AFTER TAKING  
  
 busPIRone 15 mg tablet Commonly known as:  BUSPAR Take 15 mg by mouth three (3) times daily. calcium carbonate 600 mg calcium (1,500 mg) tablet Commonly known as:  CALTREX  
TAKE 1 TABLET BY MOUTH TWICE A DAY  
  
 dextromethorphan-guaiFENesin  mg/5 mL syrup Commonly known as:  ROBITUSSIN-DM Take 10 mL by mouth every six (6) hours as needed for Cough. docusate sodium 100 mg capsule Commonly known as:  Lang Raker Take 1 Cap by mouth daily. ergocalciferol 50,000 unit capsule Commonly known as:  VITAMIN D2  
TAKE 1 CAPSULE ONCE A WEEK ON FRIDAY. esomeprazole 40 mg capsule Commonly known as:  NEXIUM  
TAKE (1) CAPSULE BY MOUTH DAILY FIBER CHOICE Chew Generic drug:  Fiber Take 1 Tab by mouth two (2) times a day. FLUOXETINE PO Take 60 mg by mouth. ibuprofen 800 mg tablet Commonly known as:  MOTRIN Take 800 mg by mouth every eight (8) hours as needed for Pain. inulin 1.5 gram Geryl Lelia Commonly known as:  FIBER CHOICE (INULIN) Take 1 Tab by mouth two (2) times a day.  
  
 ketoconazole 2 % topical cream  
Commonly known as:  NIZORAL Apply  to affected area two (2) times daily as needed for Skin Irritation. loratadine 10 mg tablet Commonly known as:  CLARITIN  
TAKE 1 TABLET BY MOUTH DAILY FOR ALLERGIES  
  
 memantine 5-10 mg Dspk Take as directed. Titrate to 10 mg BID in 3 weeks  
  
 polyethylene glycol 17 gram/dose powder Commonly known as:  Robert Juliocesar MIX 1 CAPFUL (=17GM) IN A GLASS OF WATER & DRINK ONCE DAILY AS NEEDEDFOR CONSTIPATION  
  
 SEROquel 25 mg tablet Generic drug:  QUEtiapine Take  by mouth nightly. VITAMIN B-12 1,000 mcg tablet Generic drug:  cyanocobalamin TAKE 1 TABLET BY MOUTH DAILY  
  
 XANAX 0.25 mg tablet Generic drug:  ALPRAZolam  
Take 0.25 mg by mouth two (2) times a day. Prescriptions Printed Refills  
 memantine 5-10 mg DsPk 0 Sig: Take as directed. Titrate to 10 mg BID in 3 weeks Class: Print Follow-up Instructions Return in about 3 months (around 10/13/2017). Patient Instructions A Healthy Lifestyle: Care Instructions Your Care Instructions A healthy lifestyle can help you feel good, stay at a healthy weight, and have plenty of energy for both work and play. A healthy lifestyle is something you can share with your whole family. A healthy lifestyle also can lower your risk for serious health problems, such as high blood pressure, heart disease, and diabetes. You can follow a few steps listed below to improve your health and the health of your family. Follow-up care is a key part of your treatment and safety. Be sure to make and go to all appointments, and call your doctor if you are having problems. Its also a good idea to know your test results and keep a list of the medicines you take. How can you care for yourself at home? · Do not eat too much sugar, fat, or fast foods. You can still have dessert and treats now and then. The goal is moderation. · Start small to improve your eating habits. Pay attention to portion sizes, drink less juice and soda pop, and eat more fruits and vegetables. ¨ Eat a healthy amount of food.  A 3-ounce serving of meat, for example, is about the size of a deck of cards. Fill the rest of your plate with vegetables and whole grains. ¨ Limit the amount of soda and sports drinks you have every day. Drink more water when you are thirsty. ¨ Eat at least 5 servings of fruits and vegetables every day. It may seem like a lot, but it is not hard to reach this goal. A serving or helping is 1 piece of fruit, 1 cup of vegetables, or 2 cups of leafy, raw vegetables. Have an apple or some carrot sticks as an afternoon snack instead of a candy bar. Try to have fruits and/or vegetables at every meal. 
· Make exercise part of your daily routine. You may want to start with simple activities, such as walking, bicycling, or slow swimming. Try to be active 30 to 60 minutes every day. You do not need to do all 30 to 60 minutes all at once. For example, you can exercise 3 times a day for 10 or 20 minutes. Moderate exercise is safe for most people, but it is always a good idea to talk to your doctor before starting an exercise program. 
· Keep moving. Gilda Sobieski the lawn, work in the garden, or [employer]. Take the stairs instead of the elevator at work. · If you smoke, quit. People who smoke have an increased risk for heart attack, stroke, cancer, and other lung illnesses. Quitting is hard, but there are ways to boost your chance of quitting tobacco for good. ¨ Use nicotine gum, patches, or lozenges. ¨ Ask your doctor about stop-smoking programs and medicines. ¨ Keep trying. In addition to reducing your risk of diseases in the future, you will notice some benefits soon after you stop using tobacco. If you have shortness of breath or asthma symptoms, they will likely get better within a few weeks after you quit. · Limit how much alcohol you drink. Moderate amounts of alcohol (up to 2 drinks a day for men, 1 drink a day for women) are okay. But drinking too much can lead to liver problems, high blood pressure, and other health problems. Family health If you have a family, there are many things you can do together to improve your health. · Eat meals together as a family as often as possible. · Eat healthy foods. This includes fruits, vegetables, lean meats and dairy, and whole grains. · Include your family in your fitness plan. Most people think of activities such as jogging or tennis as the way to fitness, but there are many ways you and your family can be more active. Anything that makes you breathe hard and gets your heart pumping is exercise. Here are some tips: 
¨ Walk to do errands or to take your child to school or the bus. ¨ Go for a family bike ride after dinner instead of watching TV. Where can you learn more? Go to http://shefali-kayley.info/. Enter Y658 in the search box to learn more about \"A Healthy Lifestyle: Care Instructions. \" Current as of: July 26, 2016 Content Version: 11.3 © 8315-5704 [employer]. Care instructions adapted under license by [employer] (which disclaims liability or warranty for this information). If you have questions about a medical condition or this instruction, always ask your healthcare professional. [place] [address] any warranty or liability for your use of this information. Introducing [facility] & HEALTH SERVICES! Rickey Castorena introduces [employer] patient portal. Now you can access parts of your medical record, email your doctor's office, and request medication refills online. 1. In your internet browser, go to [email or web address] 2. Click on the First Time User? Click Here link in the Sign In box. You will see the New Member Sign Up page. 3. Enter your [employer] Access Code exactly as it appears below. You will not need to use this code after youve completed the sign-up process. If you do not sign up before the expiration date, you must request a new code. · [employer] Access Code: [email or web address] Expires: 10/11/2017 10:21 AM 
 
 4. Enter the last four digits of your Social Security Number (xxxx) and Date of Birth (mm/dd/yyyy) as indicated and click Submit. You will be taken to the next sign-up page. 5. Create a Cat[employer]t ID. This will be your [employer] login ID and cannot be changed, so think of one that is secure and easy to remember. 6. Create a [employer] password. You can change your password at any time. 7. Enter your Password Reset Question and Answer. This can be used at a later time if you forget your password. 8. Enter your e-mail address. You will receive e-mail notification when new information is available in [address] 9. Click Sign Up. You can now view and download portions of your medical record. 10. Click the Download Summary menu link to download a portable copy of your medical information. If you have questions, please visit the Frequently Asked Questions section of the [employer] website. Remember, [employer] is NOT to be used for urgent needs. For medical emergencies, dial 911. Now available from your iPhone and Android! Please provide this summary of care documentation to your next provider. Your primary care clinician is listed as Tiffanie Chi. If you have any questions after today's visit, please call [phone].

## 2017-07-13 NOTE — PROGRESS NOTES
Chief Complaint   Patient presents with    Memory Loss         HISTORY OF PRESENT ILLNESS  Luis Enrique Zamora came back for follow-up. She came in along with 204 caregivers from the group home. They report that she has been having a lot of behavioral issues such as she will insist on eating certain things at long time sorted wrong places. She does not follow instructions has done some odd things such as caring her stool in her hands and urinating in the kitchen. She saw psychiatrist earlier today and these are believed to be behavioral issues. She has stopped taking a lot of her medications and Wellbutrin was stopped earlier today. She did not continue Aricept as it was causing diarrhea. She did get an extensive GI workup and it was negative. The oral dyskinesias are significantly better since she stopped Risperdal    She has baseline developmental delay/cognitive impairment. She lives in an assisted living facility and needs assistance with most activities of daily living. She also has underlying anxiety. She takes  alprazolam 0.25 mg up to 3 times a day and also on Seroquel at bedtime. Her caregiver reports that for the past 3 weeks she has had behavioral issues. Current Outpatient Prescriptions   Medication Sig    memantine 5-10 mg DsPk Take as directed. Titrate to 10 mg BID in 3 weeks    alendronate (FOSAMAX) 70 mg tablet TAKE ONE TABLET WEEKLY ON FRIDAY 30 MINUTES BEFORE 1ST MEAL WITH FULLGLASS OF WATER. DO NOT LIE DOWN FOR 30 MINUTES AFTER TAKING    ergocalciferol (VITAMIN D2) 50,000 unit capsule TAKE 1 CAPSULE ONCE A WEEK ON FRIDAY.  loratadine (CLARITIN) 10 mg tablet TAKE 1 TABLET BY MOUTH DAILY FOR ALLERGIES    inulin (FIBER CHOICE, INULIN,) 1.5 gram chew Take 1 Tab by mouth two (2) times a day.  docusate sodium (COLACE) 100 mg capsule Take 1 Cap by mouth daily.     dextromethorphan-guaiFENesin (ROBITUSSIN-DM)  mg/5 mL syrup Take 10 mL by mouth every six (6) hours as needed for Cough.  VITAMIN B-12 1,000 mcg tablet TAKE 1 TABLET BY MOUTH DAILY    QUEtiapine (SEROQUEL) 25 mg tablet Take  by mouth nightly.  ibuprofen (MOTRIN) 800 mg tablet Take 800 mg by mouth every eight (8) hours as needed for Pain.  FLUOXETINE HCL (FLUOXETINE PO) Take 60 mg by mouth.  esomeprazole (NEXIUM) 40 mg capsule TAKE (1) CAPSULE BY MOUTH DAILY    calcium carbonate (CALTREX) 600 mg (1,500 mg) tablet TAKE 1 TABLET BY MOUTH TWICE A DAY    busPIRone (BUSPAR) 15 mg tablet Take 15 mg by mouth three (3) times daily.  ketoconazole (NIZORAL) 2 % topical cream Apply  to affected area two (2) times daily as needed for Skin Irritation.  polyethylene glycol (MIRALAX) 17 gram/dose powder MIX 1 CAPFUL (=17GM) IN A GLASS OF WATER & DRINK ONCE DAILY AS NEEDEDFOR CONSTIPATION    ALPRAZolam (XANAX) 0.25 mg tablet Take 0.25 mg by mouth two (2) times a day.  Fiber (FIBER CHOICE) Chew Take 1 Tab by mouth two (2) times a day. No current facility-administered medications for this visit. PHYSICAL EXAMINATION:    Visit Vitals    /78    Pulse 68    Resp 14    Ht 4' 11\" (1.499 m)    Wt 78 kg (172 lb)    SpO2 98%    BMI 34.74 kg/m2       NEUROLOGICAL EXAMINATION:     Mental Status:   Alert and oriented to person, place. She does quite poorly on cognitive assessment and has difficulties with visuospatial, executive function, memory, abstraction and orientation. She knew it was the 13th today but could not tell me the month and year correctly. She knows the name of the city but does not know the name of for group home. She could name her brothers and sisters that live in the area. She knows her date of birth. Cranial Nerves:    II, III, IV, VI:  Visual acuity grossly intact. Visual fields are normal.    Pupils are equal, round, and reactive to light and accommodation. Extra-ocular movements are full and fluid. Fundoscopic exam was benign, no ptosis or nystagmus. V-XII: Hearing is grossly intact. Facial features are symmetric, with normal sensation and strength. The palate rises symmetrically and the tongue protrudes midline. Sternocleidomastoids 5/5. Motor Examination: Normal tone, bulk, and strength. 5/5 muscle strength throughout. No cogwheel rigidity or clonus present. Sensory exam:  Normal throughout to pinprick, temperature, and vibration sense. Normal proprioception. Coordination:  Heel-to-shin was smooth and symmetrical bilaterally. Finger to nose and rapid arm movement testing was normal.   No resting or intention tremor    Gait and Station:  Steady while walking on toes, heels, and with tandem walking. Normal arm swing. No Rhomberg or pronator drift. No muscle wasting or fasiculations noted. Reflexes:  DTRs 2+ throughout. Toes downgoing. LABS / IMAGING  Lab Results   Component Value Date/Time    TSH 1.770 04/14/2017 12:33 PM     Lab Results   Component Value Date/Time    Vitamin B12 993 11/17/2016 08:48 AM    Folate 17.7 12/04/2015 10:10 AM     ASSESSMENT    ICD-10-CM ICD-9-CM    1. Abnormal involuntary movements R25.9 781.0    2. Developmental delay R62.50 783.40    3. Dementia, unspecified, with behavioral disturbance F03.91 294.21 memantine 5-10 mg DsPk       DISCUSSION  Ms. Camille Aguilar has baseline cognitive impairment to a moderate degree. It would be difficult to exclude a coexistent and progressive neurodegenerative condition but this is suspected along with coexistent behavioral disturbance. We will try her on Namenda which can sometimes settle the behavioral issues  Continue to follow psychiatry recommendations. Continue with behavioral therapy.    Neuropsychological assessment can be considered      German Toth MD  Diplomate, American Board of Psychiatry & Neurology (Neurology)  [place] Board of Psychiatry & Neurology (Clinical Neurophysiology)  Diplomate, American Board of Electrodiagnostic Medicine

## 2017-07-13 NOTE — PROGRESS NOTES
Patient is here for follow up for mood and memory changes  Feels the memory issues are more behavioral than dementia

## 2017-07-13 NOTE — PATIENT INSTRUCTIONS

## 2017-07-14 RX ORDER — LORATADINE 10 MG/1
TABLET ORAL
Qty: 31 TAB | Status: SHIPPED | OUTPATIENT
Start: 2017-07-14 | End: 2018-06-13

## 2017-07-14 RX ORDER — CALCIUM CARBONATE 600 MG
TABLET ORAL
Qty: 62 TAB | Status: SHIPPED | OUTPATIENT
Start: 2017-07-14 | End: 2018-06-14 | Stop reason: SDUPTHER

## 2017-07-14 RX ORDER — ESOMEPRAZOLE MAGNESIUM 40 MG/1
CAPSULE, DELAYED RELEASE ORAL
Qty: 31 CAP | Status: SHIPPED | OUTPATIENT
Start: 2017-07-14 | End: 2018-02-26

## 2017-07-28 ENCOUNTER — TELEPHONE (OUTPATIENT)
Dept: NEUROLOGY | Age: 67
End: 2017-07-28

## 2017-07-28 NOTE — TELEPHONE ENCOUNTER
Facility calling because the pt is experiencing stomach issues while on the medication Dr. Nancy Miner prescribed. Please call back.

## 2017-07-31 DIAGNOSIS — F03.918 DEMENTIA, UNSPECIFIED, WITH BEHAVIORAL DISTURBANCE: Primary | ICD-10-CM

## 2017-07-31 RX ORDER — MEMANTINE HYDROCHLORIDE 5 MG-10 MG
KIT ORAL
Qty: 30 TAB | Refills: 0 | Status: SHIPPED | OUTPATIENT
Start: 2017-07-31 | End: 2017-08-08 | Stop reason: ALTCHOICE

## 2017-08-08 ENCOUNTER — OFFICE VISIT (OUTPATIENT)
Dept: INTERNAL MEDICINE CLINIC | Age: 67
End: 2017-08-08

## 2017-08-08 VITALS
RESPIRATION RATE: 16 BRPM | HEIGHT: 59 IN | DIASTOLIC BLOOD PRESSURE: 70 MMHG | SYSTOLIC BLOOD PRESSURE: 100 MMHG | OXYGEN SATURATION: 97 % | WEIGHT: 173 LBS | BODY MASS INDEX: 34.88 KG/M2 | TEMPERATURE: 98.1 F | HEART RATE: 68 BPM

## 2017-08-08 DIAGNOSIS — M81.0 AGE-RELATED OSTEOPOROSIS WITHOUT CURRENT PATHOLOGICAL FRACTURE: ICD-10-CM

## 2017-08-08 DIAGNOSIS — R10.10 PAIN OF UPPER ABDOMEN: Primary | ICD-10-CM

## 2017-08-08 DIAGNOSIS — R62.50 DEVELOPMENTAL DELAY: ICD-10-CM

## 2017-08-08 DIAGNOSIS — K59.01 SLOW TRANSIT CONSTIPATION: ICD-10-CM

## 2017-08-08 NOTE — MR AVS SNAPSHOT
Visit Information Date & Time Provider Department Dept. Phone Encounter #  
 8/8/2017  2:00 PM Anupama Mascorro, [facility] [phone] [id number] Follow-up Instructions Return in about 6 months (around 2/8/2018) for follow up. Your Appointments 10/9/2017  9:40 AM  
Follow Up with MD [facility] at [address]) Appt Note: 3 month follow up KRU 7/13  
 [address] [place] [address]  
[phone]  
  
   
 [address] [place] [address] Upcoming Health Maintenance Date Due  
 GLAUCOMA SCREENING Q2Y 5/6/2017 INFLUENZA AGE 9 TO ADULT 8/1/2017 MEDICARE YEARLY EXAM 11/9/2017 BREAST CANCER SCRN MAMMOGRAM 4/20/2018 DTaP/Tdap/Td series (2 - Td) 7/14/2019 COLONOSCOPY 7/6/2027 Allergies as of 8/8/2017  Review Complete On: 8/8/2017 By: Anupama Mascorro MD  
 No Known Allergies Current Immunizations  Reviewed on 8/5/2016 Name Date Influenza High Dose Vaccine PF 8/31/2015 Influenza Vaccine 10/27/2014, 10/16/2013 Influenza Vaccine Split 10/1/2011 Influenza Vaccine Whole 1/1/2005 PPD 6/23/2008 Pneumococcal Conjugate (PCV-13) 8/31/2015 Pneumococcal Polysaccharide (PPSV-23) 11/8/2016 TB Skin Test (PPD) Intradermal 8/28/2015 TD Vaccine 1/1/1999 TDAP Vaccine 7/14/2009 Zoster 11/12/2012 Not reviewed this visit You Were Diagnosed With   
  
 Codes Comments Pain of upper abdomen    -  Primary ICD-10-CM: R10.10 ICD-9-CM: 789.09 Slow transit constipation     ICD-10-CM: K59.01 
ICD-9-CM: 564.01 Developmental delay     ICD-10-CM: R62.50 ICD-9-CM: 783.40 Age-related osteoporosis without current pathological fracture     ICD-10-CM: M81.0 ICD-9-CM: 733.01 Vitals BP Pulse Temp Resp Height(growth percentile) Weight(growth percentile) 100/70 68 98.1 °F (36.7 °C) (Oral) 16 4' 11\" (1.499 m) 173 lb (78.5 kg) SpO2 BMI OB Status Smoking Status 97% 34.94 kg/m2 Postmenopausal Never Smoker BMI and BSA Data Body Mass Index Body Surface Area 34.94 kg/m 2 1.81 m 2 Preferred Pharmacy Pharmacy Name Phone [facility], [address] [phone] Your Updated Medication List  
  
   
This list is accurate as of: 8/8/17  3:05 PM.  Always use your most recent med list.  
  
  
  
  
 alendronate 70 mg tablet Commonly known as:  FOSAMAX TAKE ONE TABLET WEEKLY ON FRIDAY 30 MINUTES BEFORE 1ST MEAL WITH FULLGLASS OF WATER. DO NOT LIE DOWN FOR 30 MINUTES AFTER TAKING  
  
 busPIRone 15 mg tablet Commonly known as:  BUSPAR Take 15 mg by mouth three (3) times daily. calcium carbonate 600 mg calcium (1,500 mg) tablet Commonly known as:  CALTREX  
TAKE 1 TABLET BY MOUTH TWICE A DAY  
  
 dextromethorphan-guaiFENesin  mg/5 mL syrup Commonly known as:  ROBITUSSIN-DM Take 10 mL by mouth every six (6) hours as needed for Cough. docusate sodium 100 mg capsule Commonly known as:  Hope Hayde Take 1 Cap by mouth daily. ergocalciferol 50,000 unit capsule Commonly known as:  VITAMIN D2  
TAKE 1 CAPSULE ONCE A WEEK ON FRIDAY. esomeprazole 40 mg capsule Commonly known as:  NEXIUM  
TAKE (1) CAPSULE BY MOUTH DAILY FIBER CHOICE Chew Generic drug:  Fiber Take 1 Tab by mouth two (2) times a day. FLUOXETINE PO Take 60 mg by mouth. ibuprofen 800 mg tablet Commonly known as:  MOTRIN Take 800 mg by mouth every eight (8) hours as needed for Pain. inulin 1.5 gram [address] [place] [address] Commonly known as:  FIBER CHOICE (INULIN) Take 1 Tab by mouth two (2) times a day.  
  
 ketoconazole 2 % topical cream  
Commonly known as:  NIZORAL Apply  to affected area two (2) times daily as needed for Skin Irritation. loratadine 10 mg tablet Commonly known as:  CLARITIN  
TAKE 1 TABLET BY MOUTH DAILY FOR ALLERGIES  
  
 polyethylene glycol 17 gram/dose powder Commonly known as:  Valdo Varina MIX 1 CAPFUL (=17GM) IN A GLASS OF WATER & DRINK ONCE DAILY AS NEEDEDFOR CONSTIPATION  
  
 SEROquel 25 mg tablet Generic drug:  QUEtiapine Take  by mouth nightly. VITAMIN B-12 1,000 mcg tablet Generic drug:  cyanocobalamin TAKE 1 TABLET BY MOUTH DAILY  
  
 XANAX 0.25 mg tablet Generic drug:  ALPRAZolam  
Take 0.25 mg by mouth two (2) times a day. We Performed the Following CBC WITH AUTOMATED DIFF [44256 CPT(R)] METABOLIC PANEL, COMPREHENSIVE [ CPT(R)] Follow-up Instructions Return in about 6 months (around 2/8/2018) for follow up. To-Do List   
 08/08/2017 Imaging:  US ABD COMP Introducing [facility]! [facility] introduces [employer] patient portal. Now you can access parts of your medical record, email your doctor's office, and request medication refills online. 1. In your internet browser, go to [email or web address] 2. Click on the First Time User? Click Here link in the Sign In box. You will see the New Member Sign Up page. 3. Enter your Sign[employer] Access Code exactly as it appears below. You will not need to use this code after youve completed the sign-up process. If you do not sign up before the expiration date, you must request a new code. · [employer] Access Code: [email or web address] Expires: 10/11/2017 10:21 AM 
 
4. Enter the last four digits of your Social Security Number (xxxx) and Date of Birth (mm/dd/yyyy) as indicated and click Submit. You will be taken to the next sign-up page. 5. Create a [employer] ID. This will be your [employer] login ID and cannot be changed, so think of one that is secure and easy to remember. 6. Create a [employer] password. You can change your password at any time. 7. Enter your Password Reset Question and Answer. This can be used at a later time if you forget your password. 8. Enter your e-mail address. You will receive e-mail notification when new information is available in [address] 9. Click Sign Up. You can now view and download portions of your medical record. 10. Click the Download Summary menu link to download a portable copy of your medical information. If you have questions, please visit the Frequently Asked Questions section of the HASH website. Remember, HASH is NOT to be used for urgent needs. For medical emergencies, dial 911. Now available from your iPhone and Android! Please provide this summary of care documentation to your next provider. Your primary care clinician is listed as Shelly Alvarez. If you have any questions after today's visit, please call [phone].

## 2017-08-08 NOTE — PROGRESS NOTES
Chief Complaint   Patient presents with    Medication Management     1. Have you been to the ER, urgent care clinic since your last visit? Hospitalized since your last visit? No    2. Have you seen or consulted any other health care providers outside of the [address] [place] [address] since your last visit? Include any pap smears or colon screening.  No   pt c/o bowel stains on underware post BM

## 2017-08-08 NOTE — PROGRESS NOTES
HPI:  Se Blank is a 78y.o. year old female who returns to clinic today for follow up: constipation and osteoporosis. She presents today accompanied by her caretakers in the home that she is staying at this time. The caretaker states that past he does have on and off constipation but at this time has had regular bowel movements. She is concerned that Edith is not cleaning herself fully after a bowel movement. She did have a recent colonoscopy last month which they were told was normal.  She has seen Dr. Catina Woodward in GI. Carley continues to complain of vague upper abdominal pain at times. BM solid, brown. Osteoporosis: Taking calcium and vitamin D supplement. Taking Fosamax daily. No falls exercise some walking. Current Outpatient Prescriptions   Medication Sig Dispense Refill    alendronate (FOSAMAX) 70 mg tablet TAKE ONE TABLET WEEKLY ON FRIDAY 30 MINUTES BEFORE 1ST MEAL WITH FULLGLASS OF WATER. DO NOT LIE DOWN FOR 30 MINUTES AFTER TAKING 4 Tab 5    calcium carbonate (CALTREX) 600 mg calcium (1,500 mg) tablet TAKE 1 TABLET BY MOUTH TWICE A DAY 62 Tab PRN    esomeprazole (NEXIUM) 40 mg capsule TAKE (1) CAPSULE BY MOUTH DAILY 31 Cap PRN    ergocalciferol (VITAMIN D2) 50,000 unit capsule TAKE 1 CAPSULE ONCE A WEEK ON FRIDAY. 4 Cap PRN    inulin (FIBER CHOICE, INULIN,) 1.5 gram chew Take 1 Tab by mouth two (2) times a day. 60 Tab 5    dextromethorphan-guaiFENesin (ROBITUSSIN-DM)  mg/5 mL syrup Take 10 mL by mouth every six (6) hours as needed for Cough. 200 mL 0    VITAMIN B-12 1,000 mcg tablet TAKE 1 TABLET BY MOUTH DAILY 30 Tab PRN    QUEtiapine (SEROQUEL) 25 mg tablet Take  by mouth nightly.  FLUOXETINE HCL (FLUOXETINE PO) Take 60 mg by mouth.  busPIRone (BUSPAR) 15 mg tablet Take 15 mg by mouth three (3) times daily.       polyethylene glycol (MIRALAX) 17 gram/dose powder MIX 1 CAPFUL (=17GM) IN A GLASS OF WATER & DRINK ONCE DAILY AS NEEDEDFOR CONSTIPATION 527 g 0    ALPRAZolam [facility]) 0.25 mg tablet Take 0.25 mg by mouth two (2) times a day.  Fiber (FIBER CHOICE) Chew Take 1 Tab by mouth two (2) times a day.  loratadine (CLARITIN) 10 mg tablet TAKE 1 TABLET BY MOUTH DAILY FOR ALLERGIES 31 Tab PRN    docusate sodium (COLACE) 100 mg capsule Take 1 Cap by mouth daily. 30 Cap 6    ibuprofen (MOTRIN) 800 mg tablet Take 800 mg by mouth every eight (8) hours as needed for Pain.  ketoconazole (NIZORAL) 2 % topical cream Apply  to affected area two (2) times daily as needed for Skin Irritation. 15 g 0     No Known Allergies  Social History   Substance Use Topics    Smoking status: Never Smoker    Smokeless tobacco: Never Used    Alcohol use No         Review of Systems   Constitutional: Negative for chills, fever, malaise/fatigue and weight loss. Respiratory: Negative for shortness of breath. Cardiovascular: Negative for chest pain and leg swelling. Gastrointestinal: Negative for blood in stool, diarrhea, nausea and vomiting. Neurological: Negative for dizziness and headaches. Physical Exam   Constitutional: She appears well-developed. No distress. /70  Pulse 68  Temp 98.1 °F (36.7 °C) (Oral)   Resp 16  Ht 4' 11\" (1.499 m)  Wt 173 lb (78.5 kg)  SpO2 97%  BMI 34.94 kg/m2   Cardiovascular: Normal rate, regular rhythm, normal heart sounds and intact distal pulses. No murmur heard. Pulmonary/Chest: Effort normal and breath sounds normal. She has no wheezes. Abdominal: Soft. Bowel sounds are normal. She exhibits no distension and no mass. There is tenderness (Mildly tender diffusely). There is no rebound and no guarding. Musculoskeletal: She exhibits no edema. Psychiatric: She has a normal mood and affect. Vitals reviewed. Assessment & Plan:    ICD-10-CM ICD-9-CM    1. Pain of upper abdomen  May be related to her intermittent constipation and gas pain. She is a difficult historian.   Will obtain lab work and abdominal ultrasound for further evaluation. Y97.12 989.84 METABOLIC PANEL, COMPREHENSIVE      CBC WITH AUTOMATED DIFF      US ABD COMP   2. Slow transit constipation  Continue with current bowel regimen K59.01 564.01    3. Developmental delay R62.50 783.40    4. Age-related osteoporosis without current pathological fracture  Continue Fosamax, calcium and vitamin D. Encouraged exercise. M81.0 733.01    Updated physical form completed for facility she lives in. Follow-up Disposition:  Return in about 6 months (around 2/8/2018) for follow up. Advised her to call back or return to office if symptoms worsen/change/persist.  Discussed expected course/resolution/complications of diagnosis in detail with patient. Medication risks/benefits/costs/interactions/alternatives discussed with patient. She was given an after visit summary which includes diagnoses, current medications, & vitals. She expressed understanding with the diagnosis and plan.

## 2017-08-25 ENCOUNTER — HOSPITAL ENCOUNTER (OUTPATIENT)
Dept: ULTRASOUND IMAGING | Age: 67
Discharge: HOME | End: 2017-08-25
Attending: INTERNAL MEDICINE
Payer: MEDICARE

## 2017-08-25 ENCOUNTER — HOSPITAL ENCOUNTER (OUTPATIENT)
Dept: LAB | Age: 67
Discharge: HOME | End: 2017-08-25
Payer: MEDICARE

## 2017-08-25 DIAGNOSIS — R10.10 PAIN OF UPPER ABDOMEN: ICD-10-CM

## 2017-08-25 PROCEDURE — 76700 US EXAM ABDOM COMPLETE: CPT

## 2017-08-26 LAB
ALBUMIN SERPL-MCNC: 4 G/DL (ref 3.6–4.8)
ALBUMIN/GLOB SERPL: 1.6 {RATIO} (ref 1.2–2.2)
ALP SERPL-CCNC: 80 IU/L (ref 39–117)
ALT SERPL-CCNC: 11 IU/L (ref 0–32)
AST SERPL-CCNC: 15 IU/L (ref 0–40)
BASOPHILS # BLD AUTO: 0 X10E3/UL (ref 0–0.2)
BASOPHILS NFR BLD AUTO: 1 %
BILIRUB SERPL-MCNC: 0.3 MG/DL (ref 0–1.2)
BUN SERPL-MCNC: 16 MG/DL (ref 8–27)
BUN/CREAT SERPL: 19 (ref 12–28)
CALCIUM SERPL-MCNC: 8.7 MG/DL (ref 8.7–10.3)
CHLORIDE SERPL-SCNC: 104 MMOL/L (ref 96–106)
CO2 SERPL-SCNC: 22 MMOL/L (ref 18–29)
CREAT SERPL-MCNC: 0.85 MG/DL (ref 0.57–1)
EOSINOPHIL # BLD AUTO: 0 X10E3/UL (ref 0–0.4)
EOSINOPHIL NFR BLD AUTO: 1 %
ERYTHROCYTE [DISTWIDTH] IN BLOOD BY AUTOMATED COUNT: 14.5 % (ref 12.3–15.4)
GLOBULIN SER CALC-MCNC: 2.5 G/DL (ref 1.5–4.5)
GLUCOSE SERPL-MCNC: 81 MG/DL (ref 65–99)
HCT VFR BLD AUTO: 37.3 % (ref 34–46.6)
HGB BLD-MCNC: 12.3 G/DL (ref 11.1–15.9)
IMM GRANULOCYTES # BLD: 0 X10E3/UL (ref 0–0.1)
IMM GRANULOCYTES NFR BLD: 0 %
LYMPHOCYTES # BLD AUTO: 0.9 X10E3/UL (ref 0.7–3.1)
LYMPHOCYTES NFR BLD AUTO: 25 %
MCH RBC QN AUTO: 29.4 PG (ref 26.6–33)
MCHC RBC AUTO-ENTMCNC: 33 G/DL (ref 31.5–35.7)
MCV RBC AUTO: 89 FL (ref 79–97)
MONOCYTES # BLD AUTO: 0.3 X10E3/UL (ref 0.1–0.9)
MONOCYTES NFR BLD AUTO: 9 %
NEUTROPHILS # BLD AUTO: 2.3 X10E3/UL (ref 1.4–7)
NEUTROPHILS NFR BLD AUTO: 64 %
PLATELET # BLD AUTO: 144 X10E3/UL (ref 150–379)
POTASSIUM SERPL-SCNC: 4.5 MMOL/L (ref 3.5–5.2)
PROT SERPL-MCNC: 6.5 G/DL (ref 6–8.5)
RBC # BLD AUTO: 4.19 X10E6/UL (ref 3.77–5.28)
SODIUM SERPL-SCNC: 141 MMOL/L (ref 134–144)
WBC # BLD AUTO: 3.5 X10E3/UL (ref 3.4–10.8)

## 2017-10-13 RX ORDER — LANOLIN ALCOHOL/MO/W.PET/CERES
CREAM (GRAM) TOPICAL
Qty: 30 TAB | Status: SHIPPED | OUTPATIENT
Start: 2017-10-13 | End: 2018-10-12 | Stop reason: SDUPTHER

## 2017-10-30 ENCOUNTER — OFFICE VISIT (OUTPATIENT)
Dept: NEUROLOGY | Age: 67
End: 2017-10-30

## 2017-10-30 VITALS
HEART RATE: 57 BPM | RESPIRATION RATE: 14 BRPM | BODY MASS INDEX: 31.56 KG/M2 | HEIGHT: 59 IN | OXYGEN SATURATION: 91 % | DIASTOLIC BLOOD PRESSURE: 64 MMHG | SYSTOLIC BLOOD PRESSURE: 110 MMHG | WEIGHT: 156.53 LBS

## 2017-10-30 DIAGNOSIS — F03.91 DEMENTIA WITH BEHAVIORAL DISTURBANCE, UNSPECIFIED DEMENTIA TYPE: Primary | ICD-10-CM

## 2017-10-30 NOTE — PROGRESS NOTES
Chief Complaint   Patient presents with    Neurologic Problem    Memory Loss         HISTORY OF PRESENT ILLNESS  Renita Opitz came back for follow-up. She came with her caregiver from the group home. She tells me that overall she is doing quite well. She has not had any major abnormal or on behaviors. She participates in her therapy and takes her medications regularly. She sleeps well at night and does not resist going to or doing daily activities. Her mother passed away recently after which her behavior changed dramatically for the better. She was prescribed memantine at the last time but apparently this was never started. Continues to follow-up with her PCP and psychiatrist.   She was on Aricept at some point but it was causing diarrhea  She had oral dyskinesias which improved after she stopped Risperdal    She has baseline developmental delay/cognitive impairment. She needs assistance with most activities of daily living. She also has underlying anxiety. She takes  alprazolam 0.25 mg up to 3 times a day and also on Seroquel at bedtime. Current Outpatient Prescriptions   Medication Sig    cyanocobalamin (VITAMIN B-12) 1,000 mcg tablet TAKE 1 TABLET BY MOUTH DAILY    alendronate (FOSAMAX) 70 mg tablet TAKE ONE TABLET WEEKLY ON FRIDAY 30 MINUTES BEFORE 1ST MEAL WITH FULLGLASS OF WATER. DO NOT LIE DOWN FOR 30 MINUTES AFTER TAKING    calcium carbonate (CALTREX) 600 mg calcium (1,500 mg) tablet TAKE 1 TABLET BY MOUTH TWICE A DAY    loratadine (CLARITIN) 10 mg tablet TAKE 1 TABLET BY MOUTH DAILY FOR ALLERGIES    esomeprazole (NEXIUM) 40 mg capsule TAKE (1) CAPSULE BY MOUTH DAILY    ergocalciferol (VITAMIN D2) 50,000 unit capsule TAKE 1 CAPSULE ONCE A WEEK ON FRIDAY.  inulin (FIBER CHOICE, INULIN,) 1.5 gram chew Take 1 Tab by mouth two (2) times a day.  docusate sodium (COLACE) 100 mg capsule Take 1 Cap by mouth daily.     dextromethorphan-guaiFENesin (ROBITUSSIN-DM)  mg/5 mL syrup Take 10 mL by mouth every six (6) hours as needed for Cough.  QUEtiapine (SEROQUEL) 25 mg tablet Take  by mouth nightly.  ibuprofen (MOTRIN) 800 mg tablet Take 800 mg by mouth every eight (8) hours as needed for Pain.  FLUOXETINE HCL (FLUOXETINE PO) Take 60 mg by mouth.  busPIRone (BUSPAR) 15 mg tablet Take 15 mg by mouth three (3) times daily.  ketoconazole (NIZORAL) 2 % topical cream Apply  to affected area two (2) times daily as needed for Skin Irritation.  polyethylene glycol (MIRALAX) 17 gram/dose powder MIX 1 CAPFUL (=17GM) IN A GLASS OF WATER & DRINK ONCE DAILY AS NEEDEDFOR CONSTIPATION    ALPRAZolam (XANAX) 0.25 mg tablet Take 0.25 mg by mouth two (2) times a day.  Fiber (FIBER CHOICE) Chew Take 1 Tab by mouth two (2) times a day. No current facility-administered medications for this visit. PHYSICAL EXAMINATION:    Visit Vitals    /64    Pulse (!) 57    Resp 14    Ht 4' 11\" (1.499 m)    Wt 71 kg (156 lb 8.4 oz)    SpO2 91%    BMI 31.61 kg/m2       NEUROLOGICAL EXAMINATION:     Mental Status:   Alert and oriented to person, place. She does quite poorly on cognitive assessment and has difficulties with visuospatial, executive function, memory, abstraction and orientation. She could tell me the date but could not get the month right. She knew it was Halloween. She knows the name of the city but does not know the name of for group home. She knows her date of birth. Cranial Nerves:    II, III, IV, VI:  Visual acuity grossly intact. Visual fields are normal.    Pupils are equal, round, and reactive to light and accommodation. Extra-ocular movements are full and fluid. Fundoscopic exam was benign, no ptosis or nystagmus. V-XII: Hearing is grossly intact. Facial features are symmetric, with normal sensation and strength. The palate rises symmetrically and the tongue protrudes midline. Sternocleidomastoids 5/5.       Motor Examination: Normal tone, bulk, and strength. 5/5 muscle strength throughout. No cogwheel rigidity or clonus present. Sensory exam:  Normal throughout to pinprick, temperature, and vibration sense. Normal proprioception. Coordination:  Heel-to-shin was smooth and symmetrical bilaterally. Finger to nose and rapid arm movement testing was normal.   No resting or intention tremor    Gait and Station:  Steady while walking on toes, heels, and with tandem walking. Normal arm swing. No Rhomberg or pronator drift. No muscle wasting or fasiculations noted. Reflexes:  DTRs 2+ throughout. Toes downgoing. LABS / IMAGING  Lab Results   Component Value Date/Time    TSH 1.770 04/14/2017 12:33 PM     Lab Results   Component Value Date/Time    Vitamin B12 993 11/17/2016 08:48 AM    Folate 17.7 12/04/2015 10:10 AM     ASSESSMENT    ICD-10-CM ICD-9-CM    1. Dementia with behavioral disturbance, unspecified dementia type F03.91 294.21        DISCUSSION  Ms. Washington Wiley has baseline cognitive impairment to a moderate degree likely related to developmental delay. She was having behavioral issues which have improved significantly.   She is currently at her baseline and there has been a suspicion that she may have a coexistent progressive neurodegenerative condition/dementia  We will continue to monitor this clinically and avoid adding any other pharmacologic agents      Marina Painter MD  Diplomate, American Board of Psychiatry & Neurology (Neurology)  [employer] [place] of Psychiat[employer] & Neurology (Clinical Neurophysiology)  Diplomate, American Board of Electrodiagnostic Medicine

## 2017-10-30 NOTE — MR AVS SNAPSHOT
Visit Information Date & Time Provider Department Dept. Phone Encounter #  
 10/30/2017 10:15 AM [facility] [place] [address] [phone] Follow-up Instructions Return in about 6 months (around 4/30/2018). Upcoming Health Maintenance Date Due  
 GLAUCOMA SCREENING Q2Y 5/6/2017 INFLUENZA AGE 9 TO ADULT 8/1/2017 MEDICARE YEARLY EXAM 11/9/2017 BREAST CANCER SCRN MAMMOGRAM 4/20/2018 DTaP/Tdap/Td series (2 - Td) 7/14/2019 COLONOSCOPY 7/6/2027 Allergies as of 10/30/2017  Review Complete On: 10/30/2017 By: Leanne Matt MD  
 No Known Allergies Current Immunizations  Reviewed on 8/5/2016 Name Date Influenza High Dose Vaccine PF 8/31/2015 Influenza Vaccine 10/27/2014, 10/16/2013 Influenza Vaccine Split 10/1/2011 Influenza Vaccine Whole 1/1/2005 PPD 6/23/2008 Pneumococcal Conjugate (PCV-13) 8/31/2015 Pneumococcal Polysaccharide (PPSV-23) 11/8/2016 TB Skin Test (PPD) Intradermal 8/28/2015 TD Vaccine 1/1/1999 TDAP Vaccine 7/14/2009 Zoster 11/12/2012 Not reviewed this visit You Were Diagnosed With   
  
 Codes Comments Dementia with behavioral disturbance, unspecified dementia type    -  Primary ICD-10-CM: F03.91 
ICD-9-CM: 294.21 Vitals BP Pulse Resp Height(growth percentile) Weight(growth percentile) SpO2  
 110/64 (!) 57 14 4' 11\" (1.499 m) 156 lb 8.4 oz (71 kg) 91% BMI OB Status Smoking Status 31.61 kg/m2 Postmenopausal Never Smoker Vitals History BMI and BSA Data Body Mass Index Body Surface Area  
 31.61 kg/m 2 1.72 m 2 Preferred Pharmacy Pharmacy Name Phone [facility] [address] [phone] Your Updated Medication List  
  
   
This list is accurate as of: 10/30/17 10:45 AM.  Always use your most recent med list.  
  
  
  
  
 alendronate 70 mg tablet Commonly known as:  FOSAMAX TAKE ONE TABLET WEEKLY ON FRIDAY 30 MINUTES BEFORE 1ST MEAL WITH FULLGLASS OF WATER. DO NOT LIE DOWN FOR 30 MINUTES AFTER TAKING  
  
 busPIRone 15 mg tablet Commonly known as:  BUSPAR Take 15 mg by mouth three (3) times daily. calcium carbonate 600 mg calcium (1,500 mg) tablet Commonly known as:  CALTREX  
TAKE 1 TABLET BY MOUTH TWICE A DAY  
  
 cyanocobalamin 1,000 mcg tablet Commonly known as:  VITAMIN B-12 TAKE 1 TABLET BY MOUTH DAILY  
  
 dextromethorphan-guaiFENesin  mg/5 mL syrup Commonly known as:  ROBITUSSIN-DM Take 10 mL by mouth every six (6) hours as needed for Cough. docusate sodium 100 mg capsule Commonly known as:  Lucetta Cook Take 1 Cap by mouth daily. ergocalciferol 50,000 unit capsule Commonly known as:  VITAMIN D2  
TAKE 1 CAPSULE ONCE A WEEK ON FRIDAY. esomeprazole 40 mg capsule Commonly known as:  NEXIUM  
TAKE (1) CAPSULE BY MOUTH DAILY FIBER CHOICE Chew Generic drug:  Fiber Take 1 Tab by mouth two (2) times a day. FLUOXETINE PO Take 60 mg by mouth. ibuprofen 800 mg tablet Commonly known as:  MOTRIN Take 800 mg by mouth every eight (8) hours as needed for Pain. inulin 1.5 gram Awilda Goldmann Commonly known as:  FIBER CHOICE (INULIN) Take 1 Tab by mouth two (2) times a day.  
  
 ketoconazole 2 % topical cream  
Commonly known as:  NIZORAL Apply  to affected area two (2) times daily as needed for Skin Irritation. loratadine 10 mg tablet Commonly known as:  CLARITIN  
TAKE 1 TABLET BY MOUTH DAILY FOR ALLERGIES  
  
 polyethylene glycol 17 gram/dose powder Commonly known as:  Aurther Buck MIX 1 CAPFUL (=17GM) IN A GLASS OF WATER & DRINK ONCE DAILY AS NEEDEDFOR CONSTIPATION  
  
 SEROquel 25 mg tablet Generic drug:  QUEtiapine Take  by mouth nightly. XANAX 0.25 mg tablet Generic drug:  ALPRAZolam  
Take 0.25 mg by mouth two (2) times a day. Follow-up Instructions Return in about 6 months (around 4/30/2018). Patient Instructions A Healthy Lifestyle: Care Instructions Your Care Instructions A healthy lifestyle can help you feel good, stay at a healthy weight, and have plenty of energy for both work and play. A healthy lifestyle is something you can share with your whole family. A healthy lifestyle also can lower your risk for serious health problems, such as high blood pressure, heart disease, and diabetes. You can follow a few steps listed below to improve your health and the health of your family. Follow-up care is a key part of your treatment and safety. Be sure to make and go to all appointments, and call your doctor if you are having problems. It's also a good idea to know your test results and keep a list of the medicines you take. How can you care for yourself at home? · Do not eat too much sugar, fat, or fast foods. You can still have dessert and treats now and then. The goal is moderation. · Start small to improve your eating habits. Pay attention to portion sizes, drink less juice and soda pop, and eat more fruits and vegetables. ¨ Eat a healthy amount of food. A 3-ounce serving of meat, for example, is about the size of a deck of cards. Fill the rest of your plate with vegetables and whole grains. ¨ Limit the amount of soda and sports drinks you have every day. Drink more water when you are thirsty. ¨ Eat at least 5 servings of fruits and vegetables every day. It may seem like a lot, but it is not hard to reach this goal. A serving or helping is 1 piece of fruit, 1 cup of vegetables, or 2 cups of leafy, raw vegetables. Have an apple or some carrot sticks as an afternoon snack instead of a candy bar. Try to have fruits and/or vegetables at every meal. 
· Make exercise part of your daily routine. You may want to start with simple activities, such as walking, bicycling, or slow swimming.  Try to be active 30 to 60 minutes every day. You do not need to do all 30 to 60 minutes all at once. For example, you can exercise 3 times a day for 10 or 20 minutes. Moderate exercise is safe for most people, but it is always a good idea to talk to your doctor before starting an exercise program. 
· Keep moving. Dash Munoz the lawn, work in the garden, or [employer]. Take the stairs instead of the elevator at work. · If you smoke, quit. People who smoke have an increased risk for heart attack, stroke, cancer, and other lung illnesses. Quitting is hard, but there are ways to boost your chance of quitting tobacco for good. ¨ Use nicotine gum, patches, or lozenges. ¨ Ask your doctor about stop-smoking programs and medicines. ¨ Keep trying. In addition to reducing your risk of diseases in the future, you will notice some benefits soon after you stop using tobacco. If you have shortness of breath or asthma symptoms, they will likely get better within a few weeks after you quit. · Limit how much alcohol you drink. Moderate amounts of alcohol (up to 2 drinks a day for men, 1 drink a day for women) are okay. But drinking too much can lead to liver problems, high blood pressure, and other health problems. Family health If you have a family, there are many things you can do together to improve your health. · Eat meals together as a family as often as possible. · Eat healthy foods. This includes fruits, vegetables, lean meats and dairy, and whole grains. · Include your family in your fitness plan. Most people think of activities such as jogging or tennis as the way to fitness, but there are many ways you and your family can be more active. Anything that makes you breathe hard and gets your heart pumping is exercise. Here are some tips: 
¨ Walk to do errands or to take your child to school or the bus. ¨ Go for a family bike ride after dinner instead of watching TV. Where can you learn more? Go to http://shefali-kayley.info/. Enter Q203 in the search box to learn more about \"A Healthy Lifestyle: Care Instructions. \" Current as of: May 12, 2017 Content Version: 11.4 © 3771-3366 [employer]. Care instructions adapted under license by [employer] (which disclaims liability or warranty for this information). If you have questions about a medical condition or this instruction, always ask your healthcare professional. [address] any warranty or liability for your use of this information. Introducing [facility]! Mary Lou Rosario introduces [employer] patient portal. Now you can access parts of your medical record, email your doctor's office, and request medication refills online. 1. In your internet browser, go to [email or web address] 2. Click on the First Time User? Click Here link in the Sign In box. You will see the New Member Sign Up page. 3. Enter your [employer] Access Code exactly as it appears below. You will not need to use this code after youve completed the sign-up process. If you do not sign up before the expiration date, you must request a new code. · [employer] Access Code: [email or web address] Expires: 1/28/2018 10:45 AM 
 
4. Enter the last four digits of your Social Security Number (xxxx) and Date of Birth (mm/dd/yyyy) as indicated and click Submit. You will be taken to the next sign-up page. 5. Create a [employer] ID. This will be your [employer] login ID and cannot be changed, so think of one that is secure and easy to remember. 6. Create a [employer] password. You can change your password at any time. 7. Enter your Password Reset Question and Answer. This can be used at a later time if you forget your password. 8. Enter your e-mail address. You will receive e-mail notification when new information is available in [address] 9. Click Sign Up.  You can now view and download portions of your medical record. 10. Click the Download Summary menu link to download a portable copy of your medical information. If you have questions, please visit the Frequently Asked Questions section of the [employer] website. Remember, [employer] is NOT to be used for urgent needs. For medical emergencies, dial 911. Now available from your iPhone and Android! Please provide this summary of care documentation to your next provider. Your primary care clinician is listed as Roberth Cardenas. If you have any questions after today's visit, please call [phone].

## 2017-10-30 NOTE — PATIENT INSTRUCTIONS

## 2017-12-20 ENCOUNTER — OFFICE VISIT (OUTPATIENT)
Dept: INTERNAL MEDICINE CLINIC | Age: 67
End: 2017-12-20

## 2017-12-20 ENCOUNTER — HOSPITAL ENCOUNTER (OUTPATIENT)
Dept: LAB | Age: 67
Discharge: HOME | End: 2017-12-20
Payer: MEDICARE

## 2017-12-20 VITALS
WEIGHT: 175 LBS | TEMPERATURE: 97.5 F | DIASTOLIC BLOOD PRESSURE: 64 MMHG | OXYGEN SATURATION: 98 % | BODY MASS INDEX: 35.28 KG/M2 | HEIGHT: 59 IN | SYSTOLIC BLOOD PRESSURE: 110 MMHG | RESPIRATION RATE: 16 BRPM | HEART RATE: 60 BPM

## 2017-12-20 DIAGNOSIS — F32.A DEPRESSION, UNSPECIFIED DEPRESSION TYPE: ICD-10-CM

## 2017-12-20 DIAGNOSIS — M17.0 BILATERAL PRIMARY OSTEOARTHRITIS OF KNEE: ICD-10-CM

## 2017-12-20 DIAGNOSIS — Z51.81 MEDICATION MONITORING ENCOUNTER: ICD-10-CM

## 2017-12-20 DIAGNOSIS — R82.998 LEUKOCYTES IN URINE: ICD-10-CM

## 2017-12-20 DIAGNOSIS — R46.89 CHANGE IN BEHAVIOR: Primary | ICD-10-CM

## 2017-12-20 LAB
BILIRUB UR QL STRIP: NEGATIVE
GLUCOSE UR-MCNC: NEGATIVE MG/DL
KETONES P FAST UR STRIP-MCNC: NEGATIVE MG/DL
PH UR STRIP: 6.5 [PH] (ref 4.6–8)
PROT UR QL STRIP: NEGATIVE
SP GR UR STRIP: 1.01 (ref 1–1.03)
UA UROBILINOGEN AMB POC: NORMAL (ref 0.2–1)
URINALYSIS CLARITY POC: CLEAR
URINALYSIS COLOR POC: YELLOW
URINE BLOOD POC: NEGATIVE
URINE LEUKOCYTES POC: NORMAL
URINE NITRITES POC: NEGATIVE

## 2017-12-20 PROCEDURE — 87086 URINE CULTURE/COLONY COUNT: CPT

## 2017-12-20 PROCEDURE — 80053 COMPREHEN METABOLIC PANEL: CPT

## 2017-12-20 PROCEDURE — 85025 COMPLETE CBC W/AUTO DIFF WBC: CPT

## 2017-12-20 PROCEDURE — 36415 COLL VENOUS BLD VENIPUNCTURE: CPT

## 2017-12-20 PROCEDURE — 84443 ASSAY THYROID STIM HORMONE: CPT

## 2017-12-20 PROCEDURE — 81001 URINALYSIS AUTO W/SCOPE: CPT

## 2017-12-20 PROCEDURE — 84439 ASSAY OF FREE THYROXINE: CPT

## 2017-12-20 NOTE — PROGRESS NOTES
Chief Complaint   Patient presents with    Other     behavioral changes noted 12/01/2017    Other     sadness      1. Have you been to the ER, urgent care clinic since your last visit? Hospitalized since your last visit? No    2. Have you seen or consulted any other health care providers outside of the [address] [place] [address] since your last visit? Include any pap smears or colon screening.  No

## 2017-12-20 NOTE — PATIENT INSTRUCTIONS
Knee Arthritis: Exercises  Your Care Instructions  Here are some examples of exercises for knee arthritis. Start each exercise slowly. Ease off the exercise if you start to have pain. Your doctor or physical therapist will tell you when you can start these exercises and which ones will work best for you. How to do the exercises  Knee flexion with heel slide    1. Lie on your back with your knees bent. 2. Slide your heel back by bending your affected knee as far as you can. Then hook your other foot around your ankle to help pull your heel even farther back. 3. Hold for about 6 seconds, then rest for up to 10 seconds. 4. Repeat 8 to 12 times. 5. Switch legs and repeat steps 1 through 4, even if only one knee is sore. Quad sets    1. Sit with your affected leg straight and supported on the floor or a firm bed. Place a small, rolled-up towel under your knee. Your other leg should be bent, with that foot flat on the floor. 2. Tighten the thigh muscles of your affected leg by pressing the back of your knee down into the towel. 3. Hold for about 6 seconds, then rest for up to 10 seconds. 4. Repeat 8 to 12 times. 5. Switch legs and repeat steps 1 through 4, even if only one knee is sore. Straight-leg raises to the front    1. Lie on your back with your good knee bent so that your foot rests flat on the floor. Your affected leg should be straight. Make sure that your low back has a normal curve. You should be able to slip your hand in between the floor and the small of your back, with your palm touching the floor and your back touching the back of your hand. 2. Tighten the thigh muscles in your affected leg by pressing the back of your knee flat down to the floor. Hold your knee straight. 3. Keeping the thigh muscles tight and your leg straight, lift your affected leg up so that your heel is about 12 inches off the floor. Hold for about 6 seconds, then lower slowly.   4. Relax for up to 10 seconds between repetitions. 5. Repeat 8 to 12 times. 6. Switch legs and repeat steps 1 through 5, even if only one knee is sore. Active knee flexion    1. Lie on your stomach with your knees straight. If your kneecap is uncomfortable, roll up a washcloth and put it under your leg just above your kneecap. 2. Lift the foot of your affected leg by bending the knee so that you bring the foot up toward your buttock. If this motion hurts, try it without bending your knee quite as far. This may help you avoid any painful motion. 3. Slowly move your leg up and down. 4. Repeat 8 to 12 times. 5. Switch legs and repeat steps 1 through 4, even if only one knee is sore. Quadriceps stretch (facedown)    1. Lie flat on your stomach, and rest your face on the floor. 2. Wrap a towel or belt strap around the lower part of your affected leg. Then use the towel or belt strap to slowly pull your heel toward your buttock until you feel a stretch. 3. Hold for about 15 to 30 seconds, then relax your leg against the towel or belt strap. 4. Repeat 2 to 4 times. 5. Switch legs and repeat steps 1 through 4, even if only one knee is sore. Stationary exercise bike    1. If you do not have a stationary exercise bike at home, you can find one to ride at your local health club or community center. 2. Adjust the height of the bike seat so that your knee is slightly bent when your leg is extended downward. If your knee hurts when the pedal reaches the top, you can raise the seat so that your knee does not bend as much. 3. Start slowly. At first, try to do 5 to 10 minutes of cycling with little to no resistance. Then increase your time and the resistance bit by bit until you can do 20 to 30 minutes without pain. 4. If you start to have pain, rest your knee until your pain gets back to the level that is normal for you. Or cycle for less time or with less effort. Follow-up care is a key part of your treatment and safety.  Be sure to make and go to all appointments, and call your doctor if you are having problems. It's also a good idea to know your test results and keep a list of the medicines you take. Where can you learn more? Go to http://shefali-kayley.info/. Enter C159 in the search box to learn more about \"Knee Arthritis: Exercises. \"  Current as of: March 21, 2017  Content Version: 11.4  © 2076-6525 [employer]. Care instructions adapted under license by [employer] (which disclaims liability or warranty for this information). If you have questions about a medical condition or this instruction, always ask your healthcare professional. [address] any warranty or liability for your use of this information.

## 2017-12-20 NOTE — PROGRESS NOTES
HPI:  Brie Paiz is a 75y.o. year old female who returns to clinic today for follow up: She presents today accompanied by caretaker from her group home. She is here today because she has had more problems with her behavior both physical and verbal.  She has had problems with pushing caregivers. Does not want to do activities and some crying episodes. She is followed by psychiatry and was seen recently and apparently psychiatrist wanted to make sure there was no urinary tract infection occurring. She denies any urinary symptoms today she does complain of occasional abdominal pain loose bowel movements. This is been a recurrent complaint and she has had a GI evaluation with recent colonoscopy which showed only some diverticulosis. Current Outpatient Prescriptions   Medication Sig Dispense Refill    DOC-Q-LACE 100 mg capsule TAKE (1) CAPSULE BY MOUTH DAILY 31 Cap PRN    cyanocobalamin (VITAMIN B-12) 1,000 mcg tablet TAKE 1 TABLET BY MOUTH DAILY 30 Tab PRN    alendronate (FOSAMAX) 70 mg tablet TAKE ONE TABLET WEEKLY ON FRIDAY 30 MINUTES BEFORE 1ST MEAL WITH FULLGLASS OF WATER. DO NOT LIE DOWN FOR 30 MINUTES AFTER TAKING 4 Tab 5    calcium carbonate (CALTREX) 600 mg calcium (1,500 mg) tablet TAKE 1 TABLET BY MOUTH TWICE A DAY 62 Tab PRN    loratadine (CLARITIN) 10 mg tablet TAKE 1 TABLET BY MOUTH DAILY FOR ALLERGIES 31 Tab PRN    esomeprazole (NEXIUM) 40 mg capsule TAKE (1) CAPSULE BY MOUTH DAILY 31 Cap PRN    ergocalciferol (VITAMIN D2) 50,000 unit capsule TAKE 1 CAPSULE ONCE A WEEK ON FRIDAY. 4 Cap PRN    inulin (FIBER CHOICE, INULIN,) 1.5 gram chew Take 1 Tab by mouth two (2) times a day. 60 Tab 5    QUEtiapine (SEROQUEL) 25 mg tablet Take  by mouth nightly.  busPIRone (BUSPAR) 15 mg tablet Take 15 mg by mouth three (3) times daily.       polyethylene glycol (MIRALAX) 17 gram/dose powder MIX 1 CAPFUL (=17GM) IN A GLASS OF WATER & DRINK ONCE DAILY AS NEEDEDFOR CONSTIPATION 527 g 0    ALPRAZolam Marjean Pepper) 0.25 mg tablet Take 0.25 mg by mouth two (2) times a day.  Fiber (FIBER CHOICE) Chew Take 1 Tab by mouth two (2) times a day.  dextromethorphan-guaiFENesin (ROBITUSSIN-DM)  mg/5 mL syrup Take 10 mL by mouth every six (6) hours as needed for Cough. 200 mL 0    ibuprofen (MOTRIN) 800 mg tablet Take 800 mg by mouth every eight (8) hours as needed for Pain.  FLUOXETINE HCL (FLUOXETINE PO) Take 60 mg by mouth.  ketoconazole (NIZORAL) 2 % topical cream Apply  to affected area two (2) times daily as needed for Skin Irritation. 15 g 0     No Known Allergies  Social History   Substance Use Topics    Smoking status: Never Smoker    Smokeless tobacco: Never Used    Alcohol use No         ROS   It is difficult to obtain review of systems from this patient and review of systems is partly from caretaker. She is not having any fevers chills sweats nausea vomiting. No chest pain or shortness of breath. Physical Exam   Constitutional: She appears well-developed. No distress. /64  Pulse 60  Temp 97.5 °F (36.4 °C) (Oral)   Resp 16  Ht 4' 11\" (1.499 m)  Wt 175 lb (79.4 kg)  SpO2 98%  BMI 35.35 kg/m2   Neck: No thyromegaly present. Cardiovascular: Normal rate, regular rhythm, normal heart sounds and intact distal pulses. No murmur heard. Pulmonary/Chest: Effort normal and breath sounds normal. She has no wheezes. Abdominal: Soft. Bowel sounds are normal. She exhibits no distension and no mass. There is no tenderness. Musculoskeletal: She exhibits no edema. Psychiatric: She has a normal mood and affect. Pleasant and behavior is normal.  Follows directions well with no problems. Vitals reviewed. Assessment & Plan:    ICD-10-CM ICD-9-CM    1. Change in behavior suspect this is related to her depression which does not appear to be controlled at this time. And I will refer her back to psychiatry for this problem.    R46.89 283.6       METABOLIC PANEL, COMPREHENSIVE   2. Medication monitoring encounter Z51.81 V58.83 CBC WITH AUTOMATED DIFF      T4, FREE      TSH 3RD GENERATION   4. Bilateral primary osteoarthritis of knee given knee exercises. Tylenol as needed pain. M17.0 715.16       UA/M W/RFLX CULTURE, ROUTINE   5. Urine dip positive for leukocytes and will obtain full urinalysis reflex to culture. Continue current medications at this time. Follow-up Disposition:  Return in about 6 months (around 6/20/2018) for follow up. Advised her to call back or return to office if symptoms worsen/change/persist.  Discussed expected course/resolution/complications of diagnosis in detail with patient. Medication risks/benefits/costs/interactions/alternatives discussed with patient. She was given an after visit summary which includes diagnoses, current medications, & vitals. She expressed understanding with the diagnosis and plan.

## 2017-12-20 NOTE — MR AVS SNAPSHOT
Visit Information Date & Time Provider Department Dept. Phone Encounter #  
 12/20/2017 10:15 AM Chava Mukherjee, [address] [facility] [phone] [id number] Follow-up Instructions Return in about 6 months (around 6/20/2018) for follow up. Your Appointments 4/26/2018 10:40 AM  
Follow Up with Mita Stephens MD  
[facility] at [address]) Appt Note: 6 month follow up KRU 10/30  
 [address] [place] [id number]  
[phone]  
  
   
 [address] [place] [address] Upcoming Health Maintenance Date Due  
 GLAUCOMA SCREENING Q2Y 5/6/2017 Influenza Age 5 to Adult 8/1/2017 MEDICARE YEARLY EXAM 11/9/2017 BREAST CANCER SCRN MAMMOGRAM 4/20/2018 DTaP/Tdap/Td series (2 - Td) 7/14/2019 COLONOSCOPY 7/6/2027 Allergies as of 12/20/2017  Review Complete On: 12/20/2017 By: Chava Mukherjee MD  
 No Known Allergies Current Immunizations  Reviewed on 8/5/2016 Name Date Influenza High Dose Vaccine PF 8/31/2015 Influenza Vaccine 10/27/2014, 10/16/2013 Influenza Vaccine Split 10/1/2011 Influenza Vaccine Whole 1/1/2005 PPD 6/23/2008 Pneumococcal Conjugate (PCV-13) 8/31/2015 Pneumococcal Polysaccharide (PPSV-23) 11/8/2016 TB Skin Test (PPD) Intradermal 8/28/2015 TD Vaccine 1/1/1999 TDAP Vaccine 7/14/2009 Zoster 11/12/2012 Not reviewed this visit You Were Diagnosed With   
  
 Codes Comments Change in behavior    -  Primary ICD-10-CM: R46.89 
ICD-9-CM: 312.9 Medication monitoring encounter     ICD-10-CM: Z51.81 
ICD-9-CM: V58.83 Depression, unspecified depression type     ICD-10-CM: F32.9 ICD-9-CM: 693 Bilateral primary osteoarthritis of knee     ICD-10-CM: M17.0 ICD-9-CM: 715.16 Vitals BP Pulse Temp Resp Height(growth percentile) Weight(growth percentile) 110/64 60 97.5 °F (36.4 °C) (Oral) 16 4' 11\" (1.499 m) 175 lb (79.4 kg) SpO2 BMI OB Status Smoking Status 98% 35.35 kg/m2 Postmenopausal Never Smoker Vitals History BMI and BSA Data Body Mass Index Body Surface Area  
 35.35 kg/m 2 1.82 m 2 Preferred Pharmacy Pharmacy Name Phone [facility], [address] [phone] Your Updated Medication List  
  
   
This list is accurate as of: 12/20/17 10:57 AM.  Always use your most recent med list.  
  
  
  
  
 alendronate 70 mg tablet Commonly known as:  FOSAMAX TAKE ONE TABLET WEEKLY ON FRIDAY 30 MINUTES BEFORE 1ST MEAL WITH FULLGLASS OF WATER. DO NOT LIE DOWN FOR 30 MINUTES AFTER TAKING  
  
 busPIRone 15 mg tablet Commonly known as:  BUSPAR Take 15 mg by mouth three (3) times daily. calcium carbonate 600 mg calcium (1,500 mg) tablet Commonly known as:  CALTREX  
TAKE 1 TABLET BY MOUTH TWICE A DAY  
  
 cyanocobalamin 1,000 mcg tablet Commonly known as:  VITAMIN B-12 TAKE 1 TABLET BY MOUTH DAILY  
  
 dextromethorphan-guaiFENesin  mg/5 mL syrup Commonly known as:  ROBITUSSIN-DM Take 10 mL by mouth every six (6) hours as needed for Cough. DOC-Q-LACE 100 mg capsule Generic drug:  docusate sodium TAKE (1) CAPSULE BY MOUTH DAILY  
  
 ergocalciferol 50,000 unit capsule Commonly known as:  VITAMIN D2  
TAKE 1 CAPSULE ONCE A WEEK ON FRIDAY. esomeprazole 40 mg capsule Commonly known as:  NEXIUM  
TAKE (1) CAPSULE BY MOUTH DAILY FIBER CHOICE Chew Generic drug:  Fiber Take 1 Tab by mouth two (2) times a day. FLUOXETINE PO Take 60 mg by mouth. ibuprofen 800 mg tablet Commonly known as:  MOTRIN Take 800 mg by mouth every eight (8) hours as needed for Pain. inulin 1.5 gram [place] Commonly known as:  FIBER CHOICE (INULIN) Take 1 Tab by mouth two (2) times a day.  
  
 ketoconazole 2 % topical cream  
 Commonly known as:  NIZORAL Apply  to affected area two (2) times daily as needed for Skin Irritation. loratadine 10 mg tablet Commonly known as:  CLARITIN  
TAKE 1 TABLET BY MOUTH DAILY FOR ALLERGIES  
  
 polyethylene glycol 17 gram/dose powder Commonly known as:  Sue Emily MIX 1 CAPFUL (=17GM) IN A GLASS OF WATER & DRINK ONCE DAILY AS NEEDEDFOR CONSTIPATION  
  
 SEROquel 25 mg tablet Generic drug:  QUEtiapine Take  by mouth nightly. XANAX 0.25 mg tablet Generic drug:  ALPRAZolam  
Take 0.25 mg by mouth two (2) times a day. We Performed the Following CBC WITH AUTOMATED DIFF [17697 CPT(R)] METABOLIC PANEL, COMPREHENSIVE [52914 CPT(R)] T4, FREE [id number] CPT(R)] TSH 3RD GENERATION [06527 CPT(R)] UA/M W/RFLX CULTURE, ROUTINE [[id number] Custom] Follow-up Instructions Return in about 6 months (around 6/20/2018) for follow up. Patient Instructions Knee Arthritis: Exercises Your Care Instructions Here are some examples of exercises for knee arthritis. Start each exercise slowly. Ease off the exercise if you start to have pain. Your doctor or physical therapist will tell you when you can start these exercises and which ones will work best for you. How to do the exercises Knee flexion with heel slide 1. Lie on your back with your knees bent. 2. Slide your heel back by bending your affected knee as far as you can. Then hook your other foot around your ankle to help pull your heel even farther back. 3. Hold for about 6 seconds, then rest for up to 10 seconds. 4. Repeat 8 to 12 times. 5. Switch legs and repeat steps 1 through 4, even if only one knee is sore. Note 1. Sit with your affected leg straight and supported on the floor or a firm bed. Place a small, rolled-up towel under your knee. Your other leg should be bent, with that foot flat on the floor. 2. Tighten the thigh muscles of your affected leg by pressing the back of your knee down into the towel. 3. Hold for about 6 seconds, then rest for up to 10 seconds. 4. Repeat 8 to 12 times. 5. Switch legs and repeat steps 1 through 4, even if only one knee is sore. Straight-leg raises to the front 1. Lie on your back with your good knee bent so that your foot rests flat on the floor. Your affected leg should be straight. Make sure that your low back has a normal curve. You should be able to slip your hand in between the floor and the small of your back, with your palm touching the floor and your back touching the back of your hand. 2. Tighten the thigh muscles in your affected leg by pressing the back of your knee flat down to the floor. Hold your knee straight. 3. Keeping the thigh muscles tight and your leg straight, lift your affected leg up so that your heel is about 12 inches off the floor. Hold for about 6 seconds, then lower slowly. 4. Relax for up to 10 seconds between repetitions. 5. Repeat 8 to 12 times. 6. Switch legs and repeat steps 1 through 5, even if only one knee is sore. Active knee flexion 1. Lie on your stomach with your knees straight. If your kneecap is uncomfortable, roll up a washcloth and put it under your leg just above your kneecap. 2. Lift the foot of your affected leg by bending the knee so that you bring the foot up toward your buttock. If this motion hurts, try it without bending your knee quite as far. This may help you avoid any painful motion. 3. Slowly move your leg up and down. 4. Repeat 8 to 12 times. 5. Switch legs and repeat steps 1 through 4, even if only one knee is sore. Quadriceps stretch (facedown) 1. Lie flat on your stomach, and rest your face on the floor. 2. Wrap a towel or belt strap around the lower part of your affected leg. Then use the towel or belt strap to slowly pull your heel toward your buttock until you feel a stretch. 3. Hold for about 15 to 30 seconds, then relax your leg against the towel or belt strap. 4. Repeat 2 to 4 times. 5. Switch legs and repeat steps 1 through 4, even if only one knee is sore. Stationary exercise bike 1. If you do not have a stationary exercise bike at home, you can find one to ride at your local health club or community center. 2. Adjust the height of the bike seat so that your knee is slightly bent when your leg is extended downward. If your knee hurts when the pedal reaches the top, you can raise the seat so that your knee does not bend as much. 3. Start slowly. At first, try to do 5 to 10 minutes of cycling with little to no resistance. Then increase your time and the resistance bit by bit until you can do 20 to 30 minutes without pain. 4. If you start to have pain, rest your knee until your pain gets back to the level that is normal for you. Or cycle for less time or with less effort. Follow-up care is a key part of your treatment and safety. Be sure to make and go to all appointments, and call your doctor if you are having problems. It's also a good idea to know your test results and keep a list of the medicines you take. Where can you learn more? Go to http://shefali-kayley.info/. Enter C159 in the search box to learn more about \"Knee Arthritis: Exercises. \" Current as of: March 21, 2017 Content Version: 11.4 © 0452-5818 [employer]. Care instructions adapted under license by [employer] (which disclaims liability or warranty for this information). If you have questions about a medical condition or this instruction, always ask your healthcare professional. [place] [address] any warranty or liability for your use of this information. Introducing [facility]!    
 [facility] introduces [employer] patient portal. Now you can access parts of your medical record, email your doctor's office, and request medication refills online. 1. In your internet browser, go to [email or web address] 2. Click on the First Time User? Click Here link in the Sign In box. You will see the New Member Sign Up page. 3. Enter your LookBooker Access Code exactly as it appears below. You will not need to use this code after youve completed the sign-up process. If you do not sign up before the expiration date, you must request a new code. · LookBooker Access Code: [email or web address] Expires: 1/28/2018  9:45 AM 
 
4. Enter the last four digits of your Social Security Number (xxxx) and Date of Birth (mm/dd/yyyy) as indicated and click Submit. You will be taken to the next sign-up page. 5. Create a LookBooker ID. This will be your LookBooker login ID and cannot be changed, so think of one that is secure and easy to remember. 6. Create a LookBooker password. You can change your password at any time. 7. Enter your Password Reset Question and Answer. This can be used at a later time if you forget your password. 8. Enter your e-mail address. You will receive e-mail notification when new information is available in [address] 9. Click Sign Up. You can now view and download portions of your medical record. 10. Click the Download Summary menu link to download a portable copy of your medical information. If you have questions, please visit the Frequently Asked Questions section of the LookBooker website. Remember, LookBooker is NOT to be used for urgent needs. For medical emergencies, dial 911. Now available from your iPhone and Android! Please provide this summary of care documentation to your next provider. Your primary care clinician is listed as Storm Ruthie. If you have any questions after today's visit, please call [phone].

## 2017-12-21 LAB
ALBUMIN SERPL-MCNC: 3.8 G/DL (ref 3.6–4.8)
ALBUMIN/GLOB SERPL: 1.5 {RATIO} (ref 1.2–2.2)
ALP SERPL-CCNC: 81 IU/L (ref 39–117)
ALT SERPL-CCNC: 17 IU/L (ref 0–32)
AST SERPL-CCNC: 15 IU/L (ref 0–40)
BASOPHILS # BLD AUTO: 0 X10E3/UL (ref 0–0.2)
BASOPHILS NFR BLD AUTO: 0 %
BILIRUB SERPL-MCNC: 0.3 MG/DL (ref 0–1.2)
BUN SERPL-MCNC: 13 MG/DL (ref 8–27)
BUN/CREAT SERPL: 17 (ref 12–28)
CALCIUM SERPL-MCNC: 9.1 MG/DL (ref 8.7–10.3)
CHLORIDE SERPL-SCNC: 102 MMOL/L (ref 96–106)
CO2 SERPL-SCNC: 26 MMOL/L (ref 18–29)
CREAT SERPL-MCNC: 0.75 MG/DL (ref 0.57–1)
EOSINOPHIL # BLD AUTO: 0 X10E3/UL (ref 0–0.4)
EOSINOPHIL NFR BLD AUTO: 1 %
ERYTHROCYTE [DISTWIDTH] IN BLOOD BY AUTOMATED COUNT: 14 % (ref 12.3–15.4)
GLOBULIN SER CALC-MCNC: 2.5 G/DL (ref 1.5–4.5)
GLUCOSE SERPL-MCNC: 94 MG/DL (ref 65–99)
HCT VFR BLD AUTO: 39.4 % (ref 34–46.6)
HGB BLD-MCNC: 12.6 G/DL (ref 11.1–15.9)
IMM GRANULOCYTES # BLD: 0 X10E3/UL (ref 0–0.1)
IMM GRANULOCYTES NFR BLD: 0 %
LYMPHOCYTES # BLD AUTO: 1 X10E3/UL (ref 0.7–3.1)
LYMPHOCYTES NFR BLD AUTO: 22 %
MCH RBC QN AUTO: 29.6 PG (ref 26.6–33)
MCHC RBC AUTO-ENTMCNC: 32 G/DL (ref 31.5–35.7)
MCV RBC AUTO: 93 FL (ref 79–97)
MONOCYTES # BLD AUTO: 0.4 X10E3/UL (ref 0.1–0.9)
MONOCYTES NFR BLD AUTO: 9 %
NEUTROPHILS # BLD AUTO: 3.2 X10E3/UL (ref 1.4–7)
NEUTROPHILS NFR BLD AUTO: 68 %
PLATELET # BLD AUTO: 163 X10E3/UL (ref 150–379)
POTASSIUM SERPL-SCNC: 4.6 MMOL/L (ref 3.5–5.2)
PROT SERPL-MCNC: 6.3 G/DL (ref 6–8.5)
RBC # BLD AUTO: 4.25 X10E6/UL (ref 3.77–5.28)
SODIUM SERPL-SCNC: 141 MMOL/L (ref 134–144)
T4 FREE SERPL-MCNC: 1.15 NG/DL (ref 0.82–1.77)
TSH SERPL DL<=0.005 MIU/L-ACNC: 1.04 UIU/ML (ref 0.45–4.5)
WBC # BLD AUTO: 4.7 X10E3/UL (ref 3.4–10.8)

## 2017-12-22 LAB
APPEARANCE UR: CLEAR
BACTERIA #/AREA URNS HPF: NORMAL /[HPF]
BACTERIA UR CULT: NORMAL
BILIRUB UR QL STRIP: NEGATIVE
CASTS URNS QL MICRO: NORMAL /LPF
COLOR UR: YELLOW
EPI CELLS #/AREA URNS HPF: NORMAL /HPF
GLUCOSE UR QL: NEGATIVE
HGB UR QL STRIP: NEGATIVE
KETONES UR QL STRIP: NEGATIVE
LEUKOCYTE ESTERASE UR QL STRIP: ABNORMAL
MICRO URNS: ABNORMAL
MUCOUS THREADS URNS QL MICRO: PRESENT
NITRITE UR QL STRIP: NEGATIVE
PH UR STRIP: 7.5 [PH] (ref 5–7.5)
PROT UR QL STRIP: NEGATIVE
RBC #/AREA URNS HPF: NORMAL /HPF
SP GR UR: 1.01 (ref 1–1.03)
URINALYSIS REFLEX, 377202: ABNORMAL
UROBILINOGEN UR STRIP-MCNC: 0.2 MG/DL (ref 0.2–1)
WBC #/AREA URNS HPF: NORMAL /HPF

## 2018-01-29 ENCOUNTER — TELEPHONE (OUTPATIENT)
Dept: INTERNAL MEDICINE CLINIC | Age: 68
End: 2018-01-29

## 2018-01-29 NOTE — TELEPHONE ENCOUNTER
Received a call from [address] at [facility] who states the pt refused to take her medications this morning. She states usually pt will give a hard time about taking the medications and eventually take her meds. But today pt refused.

## 2018-01-29 NOTE — TELEPHONE ENCOUNTER
Called Reena back at [facility] where pt resides and informed her that pt needs to follow up with her psychiatrist. Michael states the pt family does not want the pt to be taking any medications that the pt is by prescribed by the psychiatrist. Michael states pt has been tested for Uti's in the past and they were negative.

## 2018-02-20 ENCOUNTER — HOSPITAL ENCOUNTER (INPATIENT)
Age: 68
LOS: 6 days | Discharge: HOME | DRG: 885 | End: 2018-02-26
Attending: PSYCHIATRY & NEUROLOGY | Admitting: PSYCHIATRY & NEUROLOGY
Payer: MEDICARE

## 2018-02-20 PROCEDURE — 65220000003 HC RM SEMIPRIVATE PSYCH

## 2018-02-20 RX ORDER — ZOLPIDEM TARTRATE 10 MG/1
10 TABLET ORAL
Status: DISCONTINUED | OUTPATIENT
Start: 2018-02-20 | End: 2018-02-20 | Stop reason: DRUGHIGH

## 2018-02-20 RX ORDER — OLANZAPINE 5 MG/1
5 TABLET ORAL
Status: DISCONTINUED | OUTPATIENT
Start: 2018-02-20 | End: 2018-02-26 | Stop reason: HOSPADM

## 2018-02-20 RX ORDER — BENZTROPINE MESYLATE 1 MG/ML
2 INJECTION INTRAMUSCULAR; INTRAVENOUS
Status: DISCONTINUED | OUTPATIENT
Start: 2018-02-20 | End: 2018-02-26 | Stop reason: HOSPADM

## 2018-02-20 RX ORDER — ZOLPIDEM TARTRATE 5 MG/1
5 TABLET ORAL
Status: DISCONTINUED | OUTPATIENT
Start: 2018-02-20 | End: 2018-02-26 | Stop reason: HOSPADM

## 2018-02-20 RX ORDER — ADHESIVE BANDAGE
30 BANDAGE TOPICAL DAILY PRN
Status: DISCONTINUED | OUTPATIENT
Start: 2018-02-20 | End: 2018-02-26 | Stop reason: HOSPADM

## 2018-02-20 RX ORDER — ACETAMINOPHEN 325 MG/1
650 TABLET ORAL
Status: DISCONTINUED | OUTPATIENT
Start: 2018-02-20 | End: 2018-02-26 | Stop reason: HOSPADM

## 2018-02-20 RX ORDER — LORAZEPAM 2 MG/ML
2 INJECTION INTRAMUSCULAR
Status: DISCONTINUED | OUTPATIENT
Start: 2018-02-20 | End: 2018-02-26 | Stop reason: HOSPADM

## 2018-02-20 RX ORDER — IBUPROFEN 400 MG/1
400 TABLET ORAL
Status: DISCONTINUED | OUTPATIENT
Start: 2018-02-20 | End: 2018-02-26 | Stop reason: HOSPADM

## 2018-02-20 RX ORDER — BENZTROPINE MESYLATE 2 MG/1
2 TABLET ORAL
Status: DISCONTINUED | OUTPATIENT
Start: 2018-02-20 | End: 2018-02-26 | Stop reason: HOSPADM

## 2018-02-20 RX ORDER — LORAZEPAM 1 MG/1
1 TABLET ORAL
Status: DISCONTINUED | OUTPATIENT
Start: 2018-02-20 | End: 2018-02-22

## 2018-02-20 NOTE — IP AVS SNAPSHOT
[address] [facility] Gena Erazo 13 
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 
[phone] Mark Anthony Enciso MD On 3/26/2018 You have an 11:30am appointment with your psychiatrist. [facility] [address] [facility] 
[phone] Discharge Orders None A check bakari indicates which time of day the medication should be taken. My Medications START taking these medications Instructions Each Dose to Equal  
 Morning Noon Evening Bedtime  
 hydrOXYzine HCl 25 mg tablet Commonly known as:  ATARAX Your next dose is: Today at 4pm and 9pm  
   
 Take 1 Tab by mouth three (3) times daily for 10 days. Indications: anxiety 25 mg CHANGE how you take these medications Instructions Each Dose to Equal  
 Morning Noon Evening Bedtime  
 busPIRone 10 mg tablet Commonly known as:  BUSPAR What changed:   
- medication strength 
- how much to take Your next dose is: Today at 4pm and 9pm  
   
 Take 2 Tabs by mouth three (3) times daily. Indications: Generalized Anxiety Disorder 20 mg FLUoxetine 20 mg capsule Commonly known as:  PROzac What changed:   
- medication strength - when to take this Your next dose is:  Tomorrow at [employer]  
   
 Take 3 Caps by mouth daily. Indications: ANXIETY WITH DEPRESSION  
 60 mg  
    
  
   
   
   
  
 QUEtiapine 50 mg tablet Commonly known as:  SEROquel What changed:   
- medication strength - when to take this Your next dose is: Today at pm and 9pm  
   
 Take 1 Tab by mouth three (3) times daily. Indications: BIPOLAR DISORDER IN REMISSION 50 mg CONTINUE taking these medications Instructions Each Dose to Equal  
 Morning Noon Evening Bedtime  
 alendronate 70 mg tablet Commonly known as:  FOSAMAX Your next dose is: This Friday at [address] [facility] 30 MINUTES BEFORE 1ST MEAL WITH FULLGLASS OF WATER. DO NOT LIE DOWN FOR 30 MINUTES AFTER TAKING On Friday only 
  
   
   
   
  
 calcium carbonate 600 mg calcium (1,500 mg) tablet Commonly known as:  Christinia Minors Your next dose is: Today at 9pm  
   
 TAKE 1 TABLET BY MOUTH TWICE A DAY  
     
  
   
   
   
  
  
 cyanocobalamin 1,000 mcg tablet Commonly known as:  VITAMIN B-12 Your next dose is:  Tomorrow at [address] 1 TABLET BY MOUTH DAILY  
     
  
   
   
   
  
 inulin 1.5 gram Cardona Isom Commonly known as:  FIBER CHOICE (INULIN) Your next dose is: Today at 6pm  
   
 Take 1 Tab by mouth two (2) times a day. 1 Tab  
    
  
   
   
  
   
  
 loratadine 10 mg tablet Commonly known as:  Jean-Pierre Genera Your next dose is:  Tomorrow at [address] 1 TABLET BY MOUTH DAILY FOR ALLERGIES  
     
  
   
   
   
  
 polyethylene glycol 17 gram/dose powder Commonly known as:  Becca Corona Notes to Patient:  Take this if you have not had a bowel movement in two day MIX 1 CAPFUL (=17GM) IN A GLASS OF WATER & DRINK ONCE DAILY AS NEEDEDFOR CONSTIPATION  
     
   
   
   
  
  
STOP taking these medications DOC-Q-LACE 100 mg capsule Generic drug:  docusate sodium  
   
  
 ergocalciferol 50,000 unit capsule Commonly known as:  VITAMIN D2  
   
  
 esomeprazole 40 mg capsule Commonly known as:  300 [facility] Generic drug:  Fiber XANAX 0.25 mg tablet Generic drug:  ALPRAZolam  
   
  
  
  
Where to Get Your Medications Information on where to get these meds will be given to you by the nurse or doctor. ! Ask your nurse or doctor about these medications  
  busPIRone 10 mg tablet FLUoxetine 20 mg capsule  
 hydrOXYzine HCl 25 mg tablet QUEtiapine 50 mg tablet Discharge Instructions DISCHARGE SUMMARY 
 
NAME:Shanika Thrasher : 1950 MRN: [id number] The patient Meigs Callahan exhibits the ability to control behavior in a less restrictive environment. Patient's level of functioning is improving. No assaultive/destructive behavior has been observed for the past 24 hours. No suicidal/homicidal threat or behavior has been observed for the past 24 hours. There is no evidence of serious medication side effects. Patient has not been in physical or protective restraints for at least the past 24 hours. If weapons involved, how are they secured? No weapons involved. Is patient aware of and in agreement with discharge plan? Patient and family are aware and in agreement. Arrangements for medication:  Prescriptions given to patient. Referral for substance abuse treatment? Patient is not using substances/Not applicable. Referral for smoking cessation needed? Patient is not a smoker/Not applicable. Copy of discharge instructions to provider?:  Dr. Justin Pierce Arrangements for transportation home:  Group home to . Keep all follow up appointments as scheduled, continue to take prescribed medications per physician instructions. Mental health crisis number:  730 or your local mental health crisis line number at [phone] [facility]) or [phone] ([facility]). DISCHARGE SUMMARY from Nurse PATIENT INSTRUCTIONS: 
 
What to do at Home: Recommended activity: Activity as tolerated, If you experience any of the following symptoms thoughts of harming self, feeling overwhelmed with anxiety, sadness or loneliness, please follow up with your staff. *  Please give a list of your current medications to your Primary Care Provider. *  Please update this list whenever your medications are discontinued, doses are 
    changed, or new medications (including over-the-counter products) are added. *  Please carry medication information at all times in case of emergency situations. These are general instructions for a healthy lifestyle: No smoking/ No tobacco products/ Avoid exposure to second hand smoke Surgeon General's Warning:  Quitting smoking now greatly reduces serious risk to your health. Obesity, smoking, and sedentary lifestyle greatly increases your risk for illness A healthy diet, regular physical exercise & weight monitoring are important for maintaining a healthy lifestyle You may be retaining fluid if you have a history of heart failure or if you experience any of the following symptoms:  Weight gain of 3 pounds or more overnight or 5 pounds in a week, increased swelling in our hands or feet or shortness of breath while lying flat in bed. Please call your doctor as soon as you notice any of these symptoms; do not wait until your next office visit. Recognize signs and symptoms of STROKE: 
 
F-face looks uneven A-arms unable to move or move unevenly S-speech slurred or non-existent T-time-call 911 as soon as signs and symptoms begin-DO NOT go Back to bed or wait to see if you get better-TIME IS BRAIN. Warning Signs of HEART ATTACK Call 911 if you have these symptoms: 
? Chest discomfort. Most heart attacks involve discomfort in the center of the chest that lasts more than a few minutes, or that goes away and comes back. It can feel like uncomfortable pressure, squeezing, fullness, or pain. ? Discomfort in other areas of the upper body. Symptoms can include pain or discomfort in one or both arms, the back, neck, jaw, or stomach. ? Shortness of breath with or without chest discomfort. ? Other signs may include breaking out in a cold sweat, nausea, or lightheadedness. Don't wait more than five minutes to call [address]! Fast action can save your life. Calling 911 is almost always the fastest way to get lifesaving treatment. Emergency Medical Services staff can begin treatment when they arrive  up to an hour sooner than if someone gets to the hospital by car. The discharge information has been reviewed with the patient. The patient verbalized understanding. Discharge medications reviewed with the patient and appropriate educational materials and side effects teaching were provided. ___________________________________________________________________________________________________________________________________ ACO Transitions of Care Introducing Critical access hospital [address] Zari Majano offers a voluntary care coordination program to provide high quality service and care to [facility] fee-for-service beneficiaries. Maia Sims was designed to help you enhance your health and well-being through the following services: ? Transitions of Care  support for individuals who are transitioning from one care setting to another (example: Hospital to home). ? Chronic and Complex Care Coordination  support for individuals and caregivers of those with serious or chronic illnesses or with more than one chronic (ongoing) condition and those who take a number of different medications. If you meet specific medical criteria, a [address] [place] [address] Rd may call you directly to coordinate your care with your primary care physician and your other care providers.  
 
For questions about the [facility] programs, please, contact your physicians office. For general questions or additional information about Accountable Care Organizations: 
Please visit www.medicare.gov/acos. html or call 1-800-MEDICARE (6-581.751.3588) TTY users should call 4-655.317.4248. [employer] Announcement We are excited to announce that we are making your provider's discharge notes available to you in [employer]. You will see these notes when they are completed and signed by the physician that discharged you from your recent hospital stay. If you have any questions or concerns about any information you see in [employer], please call the Health Information Department where you were seen or reach out to your Primary Care Provider for more information about your plan of care. Introducing [facility]! Mell Gillis introduces [employer] patient portal. Now you can access parts of your medical record, email your doctor's office, and request medication refills online. 1. In your internet browser, go to [email or web address] 2. Click on the First Time User? Click Here link in the Sign In box. You will see the New Member Sign Up page. 3. Enter your [employer] Access Code exactly as it appears below. You will not need to use this code after youve completed the sign-up process. If you do not sign up before the expiration date, you must request a new code. · [employer] Access Code: [email or web address] Expires: 5/27/2018  1:37 PM 
 
4. Enter the last four digits of your Social Security Number (xxxx) and Date of Birth (mm/dd/yyyy) as indicated and click Submit. You will be taken to the next sign-up page. 5. Create a [employer] ID. This will be your [employer] login ID and cannot be changed, so think of one that is secure and easy to remember. 6. Create a [employer] password. You can change your password at any time. 7. Enter your Password Reset Question and Answer. This can be used at a later time if you forget your password. 8. Enter your e-mail address. You will receive e-mail notification when new information is available in [address] 9. Click Sign Up. You can now view and download portions of your medical record. 10. Click the Download Summary menu link to download a portable copy of your medical information. If you have questions, please visit the Frequently Asked Questions section of the [employer] website. Remember, [employer] is NOT to be used for urgent needs. For medical emergencies, dial 911. Now available from your iPhone and Android! Providers Seen During Your Hospitalization Provider Specialty Primary office phone Biipn Miller MD Psychiatry [phone] Goran Garcia MD Psychiatry [phone] Your Primary Care Physician (PCP) Primary Care Physician Office Phone Office Fax Via Ant Harding  Erika [facility] [phone] You are allergic to the following No active allergies Recent Documentation Height Weight Breastfeeding? BMI OB Status Smoking Status 1.5 m 74.8 kg No 33.26 kg/m2 Postmenopausal Never Smoker Emergency Contacts Name Discharge Info Relation Home Work Mobile Jose Bueno  Other Relative [6] [phone] [address] CAREGIVER [3] Other Relative [6]   [phone] Patient Belongings The following personal items are in your possession at time of discharge: 
  Dental Appliances: None  Visual Aid: Glasses      Home Medications: None   Jewelry: None  Clothing: Socks, Footwear    Other Valuables: None  Personal Items Sent to Safe: na 
 
  
  
 Please provide this summary of care documentation to your next provider. Signatures-by signing, you are acknowledging that this After Visit Summary has been reviewed with you and you have received a copy. Patient Signature:  ____________________________________________________________ Date:  ____________________________________________________________  
  
Willim Roch Provider Signature:  ____________________________________________________________ Date:  ____________________________________________________________

## 2018-02-20 NOTE — BH NOTES
Admission Note: admit from [facility] to general unit for change in behavior and making suicidal statement. Lives at group home, [facility], message left to verify medication compliance and baseline behaviors. Oriented to unit noted intellectual delay. Demonstrates appropriate behaviors while on unit and ability to follow direction. Verbalized remorse for her aggressive behaviors towards staff at group home. Staff offer support and reassurance. Dr. Lebron Vallejo made aware of admission and awaiting call back for orders. Primary Nurse Amaury Miner RN and Eleanor Olmedo RN performed a dual skin assessment on this patient No impairment noted  Nabor score is 23    1533: per sister in law reports pt has increase defiance and acting out behaviors such as sitting on floor refusing to participate in group activities. Increase rudeness and irritability with staff and making comments such as \"I hate you\" and \"I hate myself\". States her behaviors and increase anxiety started at same time her mother .  Per family and group home staff they have requested Dr. Mary Gibson to review medications

## 2018-02-20 NOTE — IP AVS SNAPSHOT
Vivian [address] [place] [address] 
[phone] Patient: Zaynab Holder MRN: [id number] [facility]:8/72/7590 A check bakari indicates which time of day the medication should be taken. My Medications START taking these medications Instructions Each Dose to Equal  
 Morning Noon Evening Bedtime  
 hydrOXYzine HCl 25 mg tablet Commonly known as:  ATARAX Your next dose is: Today at 4pm and 9pm  
   
 Take 1 Tab by mouth three (3) times daily for 10 days. Indications: anxiety 25 mg CHANGE how you take these medications Instructions Each Dose to Equal  
 Morning Noon Evening Bedtime  
 busPIRone 10 mg tablet Commonly known as:  BUSPAR What changed:   
- medication strength 
- how much to take Your next dose is: Today at 4pm and 9pm  
   
 Take 2 Tabs by mouth three (3) times daily. Indications: Generalized Anxiety Disorder 20 mg FLUoxetine 20 mg capsule Commonly known as:  PROzac What changed:   
- medication strength - when to take this Your next dose is:  Tomorrow at [place] by mouth daily. Indications: ANXIETY WITH DEPRESSION  
 60 mg  
    
  
   
   
   
  
 QUEtiapine 50 mg tablet Commonly known as:  SEROquel What changed:   
- medication strength - when to take this Your next dose is: Today at pm and 9pm  
   
 Take 1 Tab by mouth three (3) times daily. Indications: BIPOLAR DISORDER IN REMISSION 50 mg CONTINUE taking these medications Instructions Each Dose to Equal  
 Morning Noon Evening Bedtime  
 alendronate 70 mg tablet Commonly known as:  FOSAMAX Your next dose is: This Friday at [address] 30 MINUTES BEFORE 1ST MEAL WITH FULLGLASS OF WATER. DO NOT LIE DOWN FOR 30 MINUTES AFTER TAKING On Friday only calcium carbonate 600 mg calcium (1,500 mg) tablet Commonly known as:  Dresden Sandifer Your next dose is: Today at 9pm  
   
 TAKE 1 TABLET BY MOUTH TWICE A DAY  
     
  
   
   
   
  
  
 cyanocobalamin 1,000 mcg tablet Commonly known as:  VITAMIN B-12 Your next dose is:  Tomorrow at [address] 1 TABLET BY MOUTH DAILY  
     
  
   
   
   
  
 inulin 1.5 gram Josh Garza Commonly known as:  FIBER CHOICE (INULIN) Your next dose is: Today at 6pm  
   
 Take 1 Tab by mouth two (2) times a day. 1 Tab  
    
  
   
   
  
   
  
 loratadine 10 mg tablet Commonly known as:  Marshall Last Your next dose is:  Tomorrow at [address] 1 TABLET BY MOUTH DAILY FOR ALLERGIES  
     
  
   
   
   
  
 polyethylene glycol 17 gram/dose powder Commonly known as:  Vito Nino Notes to Patient:  Take this if you have not had a bowel movement in two day MIX 1 CAPFUL (=17GM) IN A GLASS OF WATER & DRINK ONCE DAILY AS NEEDEDFOR CONSTIPATION  
     
   
   
   
  
  
STOP taking these medications DOC-Q-LACE 100 mg capsule Generic drug:  docusate sodium  
   
  
 ergocalciferol 50,000 unit capsule Commonly known as:  VITAMIN D2  
   
  
 esomeprazole 40 mg capsule Commonly known as:  300 [facility] Generic drug:  Fiber XANAX 0.25 mg tablet Generic drug:  ALPRAZolam  
   
  
  
  
Where to Get Your Medications Information on where to get these meds will be given to you by the nurse or doctor. ! Ask your nurse or doctor about these medications  
  busPIRone 10 mg tablet FLUoxetine 20 mg capsule  
 hydrOXYzine HCl 25 mg tablet QUEtiapine 50 mg tablet

## 2018-02-21 ENCOUNTER — HOSPITAL ENCOUNTER (OUTPATIENT)
Dept: GENERAL RADIOLOGY | Age: 68
Discharge: HOME | End: 2018-02-21
Attending: PSYCHIATRY & NEUROLOGY
Payer: MEDICARE

## 2018-02-21 PROCEDURE — 74011250637 HC RX REV CODE- 250/637: Performed by: PSYCHIATRY & NEUROLOGY

## 2018-02-21 PROCEDURE — 65220000003 HC RM SEMIPRIVATE PSYCH

## 2018-02-21 PROCEDURE — 72072 X-RAY EXAM THORAC SPINE 3VWS: CPT

## 2018-02-21 PROCEDURE — 70250 X-RAY EXAM OF SKULL: CPT

## 2018-02-21 RX ORDER — QUETIAPINE FUMARATE 25 MG/1
50 TABLET, FILM COATED ORAL 2 TIMES DAILY
Status: DISCONTINUED | OUTPATIENT
Start: 2018-02-21 | End: 2018-02-21

## 2018-02-21 RX ORDER — ALPRAZOLAM 1 MG/1
0.5 TABLET ORAL
Status: DISCONTINUED | OUTPATIENT
Start: 2018-02-21 | End: 2018-02-22

## 2018-02-21 RX ORDER — FLUOXETINE HYDROCHLORIDE 20 MG/1
60 CAPSULE ORAL DAILY
Status: DISCONTINUED | OUTPATIENT
Start: 2018-02-21 | End: 2018-02-26 | Stop reason: HOSPADM

## 2018-02-21 RX ORDER — BUSPIRONE HYDROCHLORIDE 10 MG/1
20 TABLET ORAL 3 TIMES DAILY
Status: DISCONTINUED | OUTPATIENT
Start: 2018-02-21 | End: 2018-02-26 | Stop reason: HOSPADM

## 2018-02-21 RX ORDER — QUETIAPINE FUMARATE 25 MG/1
50 TABLET, FILM COATED ORAL 3 TIMES DAILY
Status: DISCONTINUED | OUTPATIENT
Start: 2018-02-21 | End: 2018-02-26 | Stop reason: HOSPADM

## 2018-02-21 RX ADMIN — BUSPIRONE HYDROCHLORIDE 20 MG: 10 TABLET ORAL at 22:01

## 2018-02-21 RX ADMIN — BUSPIRONE HYDROCHLORIDE 20 MG: 10 TABLET ORAL at 17:00

## 2018-02-21 RX ADMIN — QUETIAPINE 50 MG: 25 TABLET ORAL at 17:58

## 2018-02-21 RX ADMIN — ALPRAZOLAM 0.5 MG: 1 TABLET ORAL at 17:58

## 2018-02-21 RX ADMIN — QUETIAPINE 50 MG: 25 TABLET ORAL at 21:31

## 2018-02-21 RX ADMIN — FLUOXETINE HYDROCHLORIDE 60 MG: 20 CAPSULE ORAL at 12:03

## 2018-02-21 RX ADMIN — ALPRAZOLAM 0.5 MG: 1 TABLET ORAL at 12:03

## 2018-02-21 RX ADMIN — ACETAMINOPHEN 650 MG: 325 TABLET, FILM COATED ORAL at 10:57

## 2018-02-21 NOTE — BH NOTES
Admission reviewed for medical necessity. Will follow with care [facility].

## 2018-02-21 NOTE — INTERDISCIPLINARY ROUNDS
Behavioral Health Interdisciplinary Rounds     Patient Name: Karla Torres  Age: 75 y.o. Room/Bed:  727/01  Primary Diagnosis: <principal problem not specified>   Admission Status: TDO     Readmission within 30 days: no  Power of  in place: no  Patient requires a blocked bed: no          Reason for blocked bed:     VTE Prophylaxis: Not indicated  Flu vaccine given : no -  Mobility needs/Fall risk: yes    Nutritional Plan: no  Consults:          Labs/Testing due today?: No    Sleep hours: 5.75       Participation in Care/Groups:  yes  Medication Compliant?: Yes  PRNS (last 24 hours): None    Restraints (last 24 hours):  no  Substance Abuse:  no  CIWA (range last 24 hours):  COWS (range last 24 hours):   Alcohol screening (AUDIT) completed -  AUDIT Score: 0  If applicable, date SBIRT discussed in treatment team AND documented: N/A  Tobacco - patient is a smoker: no   Date tobacco education completed by RN: n/a  24 hour chart check complete: no - New Admission    Patient goal(s) for today: Attend all groups  Treatment team focus/goals: Call REACH; psychosocial  Progress note: Patient reported staff at the group home were yelling at her so she hit them. LOS:  1  Expected LOS: TBD    Financial concerns/prescription coverage: [facility] Medicare; [facility] Medicaid  Date of last family contact: 2/21 Nursing spoke to brother/POA      Family requesting physician contact today: No  Discharge plan: Return to group home?   Guns in the home: No       Outpatient provider(s): REACH    Participating treatment team members: CECELIA Miller; Dr. Gaye Howell MD; Eulalia Herrera, YADIRA

## 2018-02-21 NOTE — BH NOTES
Received pt on unit. Pleasant and cooperative. No voiced complaints or acute distress at present.   Sl anxiety noted,but redirectable

## 2018-02-21 NOTE — PROGRESS NOTES
0732- Pt ambulates in spencer states \"I just fell. \" pt alert ambulating with steady gait. VS stable. Denies pain related to fall. Reports right knee and frontal area of head made contact with floor. Skin intact, no redness, swelling, or discoloration. 6253- pt reports mild mild back pain. Telephone order with read back: xray of debi and spine, hospitalist consult. 200- Hospitalist paged. Pt NAD. Post Fall Documentation      Faith Ortiz unwitnessed fall occurred on 2/21/18 (Date) at 18 (Time). The answers to the following questions summarize the fall:     · In the patient's own words,:  · What was he/she doing when he/she fell? Getting out of bed    · What are his/her complaints? Mid back discomfort 4/10; states she has had pain for \"longtime\" prior to fall    · Nurse:  · Document observation, treatment, conversation, follow-up, and patient response. VS stable, ice pack offered, Xray spine and debi ordered. Hospitalist consult. · What was the patient's condition when found (i.e., pain, symptoms, cuts, bruises)? 4/10 pain mid back , no discoloration, skin intact, no edema     · What specific complaints did the patient have? Denies complaint     · What did the staff do when patient was found (i.e., vital signs, returned to bed with fall alarm, side rails up)? VS stable, pt visible on unit, education provided, bed alarm in place and working, fall wrist band placed, fall risk sign on door, xray ordered    · Which physician was notified? Dr. Tresea Hamman spoke with Stephan Lowe (pt's brother and POA). Marcin Oakes will be present for TDO hearing Thurday 0730. States he will bring POA paperwork to unit tomorrow am. Marcin Oakes was informed about pt's reported fall. \"She is smarter than you think. She will play games. \" states Stephan Lowe.     Stephan Lowe contact number [phone]

## 2018-02-21 NOTE — PROGRESS NOTES
Problem: Falls - Risk of  Goal: *Absence of Falls  Document Eli Fall Risk and appropriate interventions in the flowsheet. Outcome: Progressing Towards Goal  Fall Risk Interventions:     Pt in bed asleep. NAD. No falls noted/reported. Q 15 minute checks for safety maintained.        Medication Interventions: Teach patient to arise slowly

## 2018-02-21 NOTE — PROGRESS NOTES
Problem: Depressed Mood (Adult/Pediatric)  Meet by 3/2/18  Goal: *STG: Participates in treatment plan  Outcome: Progressing Towards Goal  Pt alert oriented. Cooperative. anxious depressed. Pt meet with treatment team. PTA medication restarted. Goal: *STG: Attends activities and groups  Outcome: Progressing Towards Goal  Pt encouraged to attend groups. Pt visible on unit. Problem: Falls - Risk of  Goal: *Absence of Falls  Document Eli Fall Risk and appropriate interventions in the flowsheet. Fall Risk Interventions:     Pt reports unwitnessed fall. Medication Interventions: Teach patient to arise slowly, Patient to call before getting OOB, Bed/chair exit alarm    Elimination Interventions: Bed/chair exit alarm, Call light in reach, Patient to call for help with toileting needs, Toilet paper/wipes in reach          Variance: Other (add note)  Comments: Pt reports unwitnessed fall.      [facility] 60  Master Treatment Plan for Rochebraxton Fanning    Date Treatment Plan Initiated: 2/21/18    Treatment Plan Modalities:  Type of Modality Amount  (x minutes) Frequency (x/week) Duration (x days) Name of Responsible Staff   [address] [place] [address] meetings to encourage peer interactions 15 7 1 Milly BHARDWAJ     Group psychotherapy to assist in building coping skills and internal controls 60 7 1 Jefry Rausch LCSW   Therapeutic activity groups to build coping skills 60 7 1 Jefry Rausch LCSW   Psychoeducation in group setting to address:   Medication education   15 7 1 Paula MAY   Coping skills         Relaxation techniques         Symptom management         Discharge planning         Spirituality    60 2 14[facility]   60 1 1 Volunteer from [facility]   Recovery/AA/NA   61 3 1 Volunteer from [address] [place] [address] medication management   15 7 1 Dr. Montana Kumari

## 2018-02-21 NOTE — PROGRESS NOTES
Problem: Depressed Mood (Adult/Pediatric)  Goal: *STG: Attends activities and groups  Outcome: Progressing Towards Goal  Patient is sitting in the dinning room; interacting with staff and peers. No distress noted.   Patient remains safe in hospital.

## 2018-02-21 NOTE — H&P
INITIAL PSYCHIATRIC EVALUATION            IDENTIFICATION:    Patient Name  Jaime Guerra   Date of Birth 1950   [facility] [id number]   Medical Record Number  [id number]      Age  75 y.o. PCP Berta Roach MD   Admit date:  2/20/2018    Room Number  727/01  @ [facility]   Date of Service  2/21/2018            HISTORY         REASON FOR HOSPITALIZATION:  CC: \"behavioral issues and dementia\". Pt admitted under a voluntary basis for dementia and behavioral problems  proving to be an imminent danger to self and others and an inability to care for self. HISTORY OF PRESENT ILLNESS:    The patient, Jaime Guerra, is a 75 y.o. WHITE [place]  female with a past psychiatric history significant for dementia, who presents at this time with complaints of (and/or evidence of) the following emotional symptoms: agitation. Additional symptomatology include anxiety. The above symptoms have been present for years . These symptoms are of acute severity. These symptoms are intermittent/ fleeting in nature. The patient's condition has been precipitated by poor coping skills and psychosocial stressors (mother's death ). Patient's condition made worse by poor behavioral conttrol.; BAL=0. ALLERGIES: No Known Allergies   MEDICATIONS PRIOR TO ADMISSION:   Prescriptions Prior to Admission   Medication Sig    alendronate (FOSAMAX) 70 mg tablet TAKE ONE TABLET WEEKLY ON FRIDAY 30 MINUTES BEFORE 1ST MEAL WITH FULLGLASS OF WATER.  DO NOT LIE DOWN FOR 30 MINUTES AFTER TAKING    DOC-Q-LACE 100 mg capsule TAKE (1) CAPSULE BY MOUTH DAILY    cyanocobalamin (VITAMIN B-12) 1,000 mcg tablet TAKE 1 TABLET BY MOUTH DAILY    calcium carbonate (CALTREX) 600 mg calcium (1,500 mg) tablet TAKE 1 TABLET BY MOUTH TWICE A DAY    loratadine (CLARITIN) 10 mg tablet TAKE 1 TABLET BY MOUTH DAILY FOR ALLERGIES    esomeprazole (NEXIUM) 40 mg capsule TAKE (1) CAPSULE BY MOUTH DAILY    ergocalciferol (VITAMIN D2) 50,000 unit capsule TAKE 1 CAPSULE ONCE A WEEK ON FRIDAY.  inulin (FIBER CHOICE, INULIN,) 1.5 gram chew Take 1 Tab by mouth two (2) times a day.  FLUOXETINE HCL (FLUOXETINE PO) Take 60 mg by mouth.  busPIRone (BUSPAR) 15 mg tablet Take 15 mg by mouth three (3) times daily.  polyethylene glycol (MIRALAX) 17 gram/dose powder MIX 1 CAPFUL (=17GM) IN A GLASS OF WATER & DRINK ONCE DAILY AS NEEDEDFOR CONSTIPATION    ALPRAZolam (XANAX) 0.25 mg tablet Take 0.5 mg by mouth three (3) times daily (with meals).  Fiber (FIBER CHOICE) Chew Take 1 Tab by mouth two (2) times a day.  QUEtiapine (SEROQUEL) 25 mg tablet Take 50 mg by mouth two (2) times a day. PAST MEDICAL HISTORY:   Past Medical History:   Diagnosis Date    Allergic rhinitis, cause unspecified 7/15/2010    Anxiety     Decreased bone density 07/24/09    Dexa Scan     Detached retina     Diverticulosis     Falls     GERD (gastroesophageal reflux disease)     HTN (hypertension)     Memory disorder     Menorrhagia     Mental retardation     Muscle weakness     Osteopenia     Other and unspecified hyperlipidemia     S/P colonoscopy 03/01/08    Shortness of breath     Snoring     Thrombocytopenia (HCC)      Past Surgical History:   Procedure Laterality Date    COLONOSCOPY  3-5-2008    HX CATARACT REMOVAL      HX OVARIAN CYST REMOVAL      HX POLYPECTOMY      ablation      SOCIAL HISTORY:    Social History     Social History    Marital status: SINGLE     Spouse name: N/A    Number of children: N/A    Years of education: N/A     Occupational History    Not on file. Social History Main Topics    Smoking status: Never Smoker    Smokeless tobacco: Never Used    Alcohol use No    Drug use: No    Sexual activity: No     Other Topics Concern    Not on file     Social History Narrative    75year old  female ad,itted from group home for peroidic agitation. Pt is ID and also has dementia.        FAMILY HISTORY:    Family History   Problem Relation Age of Onset    Diabetes Father     Heart Disease Father        REVIEW OF SYSTEMS:   Psychological ROS: positive for - behavioral disorder  Respiratory ROS: no cough, shortness of breath, or wheezing  Cardiovascular ROS: no chest pain or dyspnea on exertion  Pertinent items are noted in the History of Present Illness. All other Systems reviewed and are considered negative. MENTAL STATUS EXAM & VITALS     MENTAL STATUS EXAM (MSE):    MSE FINDINGS ARE WITHIN NORMAL LIMITS (WNL) UNLESS OTHERWISE STATED BELOW. ( ALL OF THE BELOW CATEGORIES OF THE MSE HAVE BEEN REVIEWED (reviewed 2/21/2018) AND UPDATED AS DEEMED APPROPRIATE )  General Presentation age appropriate, cooperative   Orientation Alert and Oriented x 1   Vital Signs  See below (reviewed 2/21/2018); Vital Signs (BP, Pulse, & Temp) are within normal limits if not listed below.    Gait and Station Stable/steady, no ataxia   Musculoskeletal System No extrapyramidal symptoms (EPS); no abnormal muscular movements or Tardive Dyskinesia (TD); muscle strength and tone are within normal limits   Language No aphasia or dysarthria   Speech:  monotone   Thought Processes Primitive and concrete; slow rate of thoughts; poor abstract reasoning/computation   Thought Associations circumstantial   Thought Content not internally preoccupied   Suicidal Ideations none   Homicidal Ideations none   Mood:  stable    Affect:  mood-congruent   Memory recent  impaired   Memory remote:  impaired   Concentration/Attention:  distractable   Fund of Knowledge significantly below average   Insight:  poor   Reliability poor   Judgment:  poor          VITALS:     Patient Vitals for the past 24 hrs:   Temp Pulse Resp BP SpO2   02/21/18 0824 97.6 °F (36.4 °C) 85 16 126/61 99 %   02/21/18 0735 98.8 °F (37.1 °C) 76 16 134/66 97 %   02/20/18 2023 98.9 °F (37.2 °C) 76 16 142/89 -   02/20/18 1522 98.1 °F (36.7 °C) 85 18 134/86 98 %     Wt Readings from Last 3 Encounters:   02/20/18 73.9 kg (163 lb)   12/20/17 79.4 kg (175 lb)   10/30/17 71 kg (156 lb 8.4 oz)     Temp Readings from Last 3 Encounters:   02/21/18 97.6 °F (36.4 °C)   12/20/17 97.5 °F (36.4 °C) (Oral)   08/08/17 98.1 °F (36.7 °C) (Oral)     BP Readings from Last 3 Encounters:   02/21/18 126/61   12/20/17 110/64   10/30/17 110/64     Pulse Readings from Last 3 Encounters:   02/21/18 85   12/20/17 60   10/30/17 (!) 57            DATA     LABORATORY DATA:  Labs Reviewed - No data to display  No visits with results within 2 Day(s) from this visit. Latest known visit with results is:    Office Visit on 12/20/2017   Component Date Value Ref Range Status    WBC 12/20/2017 4.7  3.4 - 10.8 x10E3/uL Final    RBC 12/20/2017 4.25  3.77 - 5.28 x10E6/uL Final    HGB 12/20/2017 12.6  11.1 - 15.9 g/dL Final    HCT 12/20/2017 39.4  34.0 - 46.6 % Final    MCV 12/20/2017 93  79 - 97 fL Final    MCH 12/20/2017 29.6  26.6 - 33.0 pg Final    MCHC 12/20/2017 32.0  31.5 - 35.7 g/dL Final    RDW 12/20/2017 14.0  12.3 - 15.4 % Final    PLATELET 81/81/8617 236  150 - 379 x10E3/uL Final    NEUTROPHILS 12/20/2017 68  Not Estab. % Final    Lymphocytes 12/20/2017 22  Not Estab. % Final    MONOCYTES 12/20/2017 9  Not Estab. % Final    EOSINOPHILS 12/20/2017 1  Not Estab. % Final    BASOPHILS 12/20/2017 0  Not Estab. % Final    ABS. NEUTROPHILS 12/20/2017 3.2  1.4 - 7.0 x10E3/uL Final    Abs Lymphocytes 12/20/2017 1.0  0.7 - 3.1 x10E3/uL Final    ABS. MONOCYTES 12/20/2017 0.4  0.1 - 0.9 x10E3/uL Final    ABS. EOSINOPHILS 12/20/2017 0.0  0.0 - 0.4 x10E3/uL Final    ABS. BASOPHILS 12/20/2017 0.0  0.0 - 0.2 x10E3/uL Final    IMMATURE GRANULOCYTES 12/20/2017 0  Not Estab. % Final    ABS. IMM. GRANS.  12/20/2017 0.0  0.0 - 0.1 x10E3/uL Final    Glucose 12/20/2017 94  65 - 99 mg/dL Final    BUN 12/20/2017 13  8 - 27 mg/dL Final    Creatinine 12/20/2017 0.75  0.57 - 1.00 mg/dL Final    GFR est non-AA 12/20/2017 82  >59 mL/min/1.73 Final    GFR est AA 12/20/2017 95  >59 mL/min/1.73 Final    BUN/Creatinine ratio 12/20/2017 17  12 - 28 Final    Sodium 12/20/2017 141  134 - 144 mmol/L Final    Potassium 12/20/2017 4.6  3.5 - 5.2 mmol/L Final    Chloride 12/20/2017 102  96 - 106 mmol/L Final    CO2 12/20/2017 26  18 - 29 mmol/L Final    Calcium 12/20/2017 9.1  8.7 - 10.3 mg/dL Final    Protein, total 12/20/2017 6.3  6.0 - 8.5 g/dL Final    Albumin 12/20/2017 3.8  3.6 - 4.8 g/dL Final    GLOBULIN, TOTAL 12/20/2017 2.5  1.5 - 4.5 g/dL Final    A-G Ratio 12/20/2017 1.5  1.2 - 2.2 Final    Bilirubin, total 12/20/2017 0.3  0.0 - 1.2 mg/dL Final    Alk.  phosphatase 12/20/2017 81  39 - 117 IU/L Final    AST (SGOT) 12/20/2017 15  0 - 40 IU/L Final    ALT (SGPT) 12/20/2017 17  0 - 32 IU/L Final    TSH 12/20/2017 1.040  0.450 - 4.500 uIU/mL Final    T4, Free 12/20/2017 1.15  0.82 - 1.77 ng/dL Final    Color (UA POC) 12/20/2017 Yellow   Final    Clarity (UA POC) 12/20/2017 Clear   Final    Glucose (UA POC) 12/20/2017 Negative  Negative Final    Bilirubin (UA POC) 12/20/2017 Negative  Negative Final    Ketones (UA POC) 12/20/2017 Negative  Negative Final    Specific gravity (UA POC) 12/20/2017 1.015  1.001 - 1.035 Final    Blood (UA POC) 12/20/2017 Negative  Negative Final    pH (UA POC) 12/20/2017 6.5  4.6 - 8.0 Final    Protein (UA POC) 12/20/2017 Negative  Negative Final    Urobilinogen (UA POC) 12/20/2017 0.2 mg/dL  0.2 - 1 Final    Nitrites (UA POC) 12/20/2017 Negative  Negative Final    Leukocyte esterase (UA POC) 12/20/2017 1+  Negative Final    Specific Gravity 12/20/2017 1.013  1.005 - 1.030 Final    pH (UA) 12/20/2017 7.5  5.0 - 7.5 Final    Color 12/20/2017 Yellow  Yellow Final    Appearance 12/20/2017 Clear  Clear Final    Leukocyte Esterase 12/20/2017 Trace* Negative Final    Protein 12/20/2017 Negative  Negative/Trace Final    Glucose 12/20/2017 Negative  Negative Final    Ketone 12/20/2017 Negative  Negative Final    Blood 12/20/2017 Negative  Negative Final    Bilirubin 12/20/2017 Negative  Negative Final    Urobilinogen 12/20/2017 0.2  0.2 - 1.0 mg/dL Final    Nitrites 12/20/2017 Negative  Negative Final    Microscopic Examination 12/20/2017 See additional order   Final    URINALYSIS REFLEX 12/20/2017 Comment   Final    WBC 12/20/2017 0-5  0 - 5 /hpf Final    RBC 12/20/2017 0-2  0 - 2 /hpf Final    Epithelial cells 12/20/2017 0-10  0 - 10 /hpf Final    Casts 12/20/2017 None seen  None seen /lpf Final    Mucus 12/20/2017 Present  Not Estab. Final    Bacteria 12/20/2017 Few  None seen/Few Final    Urine Culture, Routine 12/20/2017    Final                    Value:Mixed urogenital ranjana  10,000-25,000 colony forming units per mL          RADIOLOGY REPORTS:    Results from Hospital Encounter encounter on 10/13/15   XR KNEE RT MAX 2 VWS   Narrative **Final Report**      ICD Codes / Adm. Diagnosis: 719.46  M25.561 / Pain in joint, lower leg  Pain   in right knee  Examination:  CR KNEE MAX 2 VWS RT  - [id number] - Oct 13 2015  2:04PM  Accession No:  [id number]  Reason:  Knee pain, crepitus OA? REPORT:  EXAM:  CR KNEE MAX 2 VWS RT    INDICATION:   Knee pain, crepitus OA? Levorn Chavez Pain in joint, lower leg Pain in   right knee fall yesterday    COMPARISON: None. FINDINGS: Two views of the right knee demonstrate no fracture or other acute   osseous or articular abnormality. There is a joint effusion. Prominent DJD   of the patella, moderate DJD of the tibiofemoral joints with spurring and   joint space narrowing, the bone is normal in mineral contents. IMPRESSION:  No acute bony abnormality. DJD and joint effusion           Signing/Reading Doctor: Bryce Lassiter ([id number])    Approved: Bryce Lassiter ([id number])  Oct 13 2015  2:19PM                                 No results found.            MEDICATIONS       ALL MEDICATIONS  Current Facility-Administered Medications   Medication Dose Route Frequency    ALPRAZolam (XANAX) tablet 0.5 mg  0.5 mg Oral TID WITH MEALS    busPIRone (BUSPAR) tablet 15 mg  15 mg Oral TID    . PHARMACY TO SUBSTITUTE PER PROTOCOL    Per Protocol    QUEtiapine (SEROquel) tablet 50 mg  50 mg Oral BID    ziprasidone (GEODON) 20 mg in sterile water (preservative free) 1 mL injection  20 mg IntraMUSCular BID PRN    OLANZapine (ZyPREXA) tablet 5 mg  5 mg Oral Q6H PRN    benztropine (COGENTIN) tablet 2 mg  2 mg Oral BID PRN    benztropine (COGENTIN) injection 2 mg  2 mg IntraMUSCular BID PRN    LORazepam (ATIVAN) injection 2 mg  2 mg IntraMUSCular Q4H PRN    LORazepam (ATIVAN) tablet 1 mg  1 mg Oral Q4H PRN    acetaminophen (TYLENOL) tablet 650 mg  650 mg Oral Q4H PRN    ibuprofen (MOTRIN) tablet 400 mg  400 mg Oral Q8H PRN    magnesium hydroxide (MILK OF MAGNESIA) 400 mg/5 mL oral suspension 30 mL  30 mL Oral DAILY PRN    zolpidem (AMBIEN) tablet 5 mg  5 mg Oral QHS PRN      SCHEDULED MEDICATIONS  Current Facility-Administered Medications   Medication Dose Route Frequency    ALPRAZolam (XANAX) tablet 0.5 mg  0.5 mg Oral TID WITH MEALS    busPIRone (BUSPAR) tablet 15 mg  15 mg Oral TID    QUEtiapine (SEROquel) tablet 50 mg  50 mg Oral BID                ASSESSMENT & PLAN        The patient, Cara Sims, is a 75 y.o.  female who presents at this time for treatment of the following diagnoses:  Patient Active Hospital Problem List:   Dementia with behavioral disturbance (7/13/2017)    Assessment: poor memory, poor executive functioning- microcascular ischemia    Plan: namenda   Developmental delay (11/23/2015)    Assessment: chronological age much older than emotional age- significant intellectual deficits     Plan: supportive therapy        . A coordinated, multidisplinary treatment team (includes the nurse, unit pharmcist,  and writer) round was conducted for this initial evaluation with the patient present.        I will continue to adjust psychiatric and non-psychiatric medications (see above \"medication\" section and orders section for details) as deemed appropriate & based upon diagnoses and response to treatment. I have reviewed admission (and previous/old) labs and medical tests in the EHR and or transferring hospital documents. I will continue to order blood tests/labs and diagnostic tests as deemed appropriate and review results as they become available (see orders for details). I have reviewed old psychiatric and medical records available in the EHR. I Will order additional psychiatric records from other institutions to further elucidate the nature of patient's psychopathology and review once available. I will gather additional collateral information from friends, family and o/p treatment team to further elucidate the nature of patient's psychopathology and baselline level of psychiatric functioning.       ESTIMATED LENGTH OF STAY:    3-5 days        STRENGTHS:  Exercising self-direction/Resourceful, Access to housing/residential stability and Interpersonal/supportive relationships (family, friends, peers)                                        SIGNED:    Flor Morley MD  2/21/2018

## 2018-02-21 NOTE — BH NOTES
PSYCHIATRIC PROGRESS NOTE         Patient Name  Harlene Angelucci   Date of Birth 1950   [facility] [id number]   Medical Record Number  [id number]      Age  75 y.o. PCP Brandon Knight MD   Admit date:  2/20/2018    Room Number  727/01  @ [facility]   Date of Service  2/21/2018          PSYCHOTHERAPY SESSION NOTE:  Length of psychotherapy session: 30 minutes    Main condition/diagnosis/issues treated during session today, 2/21/2018 : behavioral control, anger management, treatment compliance    I employed Cognitive Behavioral therapy techniques, Reality-Oriented psychotherapy, as well as supportive psychotherapy in regards to various ongoing psychosocial stressors, including the following: pre-admission and current problems; housing issues;  stress of hospitalization. Interpersonal relationship issues and psychodynamic conflicts explored. Attempts made to alleviate maladaptive patterns. We, also, worked on issues of denial & effects of substance dependency/use     Overall, patient is not progressing    Treatment Plan Update (reviewed an updated 2/21/2018) : I will modify psychotherapy tx plan by implementing more stress management strategies, building upon cognitive behavioral techniques, increasing coping skills, as well as shoring up psychological defenses). An extended energy and skill set was needed to engage pt in psychotherapy due to some of the following: resistiveness, complexity, negativity, confrontational nature, hostile behaviors, and/or severe abnormalities in thought processes/psychosis resulting in the loss of expressive/receptive language communication skills. E & M PROGRESS NOTE:         HISTORY       CC:  \"behavioral problems\"  HISTORY OF PRESENT ILLNESS/INTERVAL HISTORY:  (reviewed/updated 2/21/2018). per initial evaluation:     Harlene Angelucci presents/reports/evidences the following emotional symptoms today, 2/21/2018:agitation.  The above symptoms have been present for years . These symptoms are of acute severity. The symptoms are intermittent/ fleeting in nature. Additional symptomatology and features include anxiety. SIDE EFFECTS: (reviewed/updated 2/21/2018)  None reported or admitted to. No noted toxicity with use of Depakote/Tegretol/lithium/Clozaril/TCAs   ALLERGIES:(reviewed/updated 2/21/2018)  No Known Allergies   MEDICATIONS PRIOR TO ADMISSION:(reviewed/updated 2/21/2018)  Prescriptions Prior to Admission   Medication Sig    alendronate (FOSAMAX) 70 mg tablet TAKE ONE TABLET WEEKLY ON FRIDAY 30 MINUTES BEFORE 1ST MEAL WITH FULLGLASS OF WATER. DO NOT LIE DOWN FOR 30 MINUTES AFTER TAKING    DOC-Q-LACE 100 mg capsule TAKE (1) CAPSULE BY MOUTH DAILY    cyanocobalamin (VITAMIN B-12) 1,000 mcg tablet TAKE 1 TABLET BY MOUTH DAILY    calcium carbonate (CALTREX) 600 mg calcium (1,500 mg) tablet TAKE 1 TABLET BY MOUTH TWICE A DAY    loratadine (CLARITIN) 10 mg tablet TAKE 1 TABLET BY MOUTH DAILY FOR ALLERGIES    esomeprazole (NEXIUM) 40 mg capsule TAKE (1) CAPSULE BY MOUTH DAILY    ergocalciferol (VITAMIN D2) 50,000 unit capsule TAKE 1 CAPSULE ONCE A WEEK ON FRIDAY.  inulin (FIBER CHOICE, INULIN,) 1.5 gram chew Take 1 Tab by mouth two (2) times a day.  FLUOXETINE HCL (FLUOXETINE PO) Take 60 mg by mouth.  busPIRone (BUSPAR) 15 mg tablet Take 15 mg by mouth three (3) times daily.  polyethylene glycol (MIRALAX) 17 gram/dose powder MIX 1 CAPFUL (=17GM) IN A GLASS OF WATER & DRINK ONCE DAILY AS NEEDEDFOR CONSTIPATION    ALPRAZolam (XANAX) 0.25 mg tablet Take 0.5 mg by mouth three (3) times daily (with meals).  Fiber (FIBER CHOICE) Chew Take 1 Tab by mouth two (2) times a day.  QUEtiapine (SEROQUEL) 25 mg tablet Take 50 mg by mouth two (2) times a day.       PAST MEDICAL HISTORY: Past medical history from the initial psychiatric evaluation has been reviewed (reviewed/updated 2/21/2018) with no additional updates (I asked patient and no additional past medical history provided). Past Medical History:   Diagnosis Date    Allergic rhinitis, cause unspecified 7/15/2010    Anxiety     Decreased bone density 07/24/09    Dexa Scan     Detached retina     Diverticulosis     Falls     GERD (gastroesophageal reflux disease)     HTN (hypertension)     Memory disorder     Menorrhagia     Mental retardation     Muscle weakness     Osteopenia     Other and unspecified hyperlipidemia     S/P colonoscopy 03/01/08    Shortness of breath     Snoring     Thrombocytopenia (HCC)      Past Surgical History:   Procedure Laterality Date    COLONOSCOPY  3-5-2008    HX CATARACT REMOVAL      HX OVARIAN CYST REMOVAL      HX POLYPECTOMY      ablation      SOCIAL HISTORY: Social history from the initial psychiatric evaluation has been reviewed (reviewed/updated 2/21/2018) with no additional updates (I asked patient and no additional social history provided). Social History     Social History    Marital status: SINGLE     Spouse name: N/A    Number of children: N/A    Years of education: N/A     Occupational History    Not on file. Social History Main Topics    Smoking status: Never Smoker    Smokeless tobacco: Never Used    Alcohol use No    Drug use: No    Sexual activity: No     Other Topics Concern    Not on file     Social History Narrative    75year old  female ad,itted from group home for peroidic agitation. Pt is ID and also has dementia. FAMILY HISTORY: Family history from the initial psychiatric evaluation has been reviewed (reviewed/updated 2/21/2018) with no additional updates (I asked patient and no additional family history provided).  Family History   Problem Relation Age of Onset    Diabetes Father     Heart Disease Father        REVIEW OF SYSTEMS: (reviewed/updated 2/21/2018)  Appetite:no change from normal   Sleep: no change   All other Review of Systems: Psychological ROS: positive for - behavioral disorder  Respiratory ROS: no cough, shortness of breath, or wheezing  Cardiovascular ROS: no chest pain or dyspnea on exertion         [facility] (MSE):    MSE FINDINGS ARE WITHIN NORMAL LIMITS (WNL) UNLESS OTHERWISE STATED BELOW. ( ALL OF THE BELOW CATEGORIES OF THE MSE HAVE BEEN REVIEWED (reviewed 2/21/2018) AND UPDATED AS DEEMED APPROPRIATE )  General Presentation age appropriate, cooperative   Orientation Alert and Oriented x 1   Vital Signs  See below (reviewed 2/21/2018); Vital Signs (BP, Pulse, & Temp) are within normal limits if not listed below.    Gait and Station Stable/steady, no ataxia   Musculoskeletal System No extrapyramidal symptoms (EPS); no abnormal muscular movements or Tardive Dyskinesia (TD); muscle strength and tone are within normal limits   Language No aphasia or dysarthria   Speech:  hyperverbal   Thought Processes Primitive  slow rate of thoughts; poor abstract reasoning/computation   Thought Associations circumstantial   Thought Content not internally preoccupied   Suicidal Ideations none   Homicidal Ideations none   Mood:  stable    Affect:  stable   Memory recent  impaired   Memory remote:  impaired   Concentration/Attention:  distractable   Fund of Knowledge significantly below average   Insight:  poor   Reliability poor   Judgment:  poor          VITALS:     Patient Vitals for the past 24 hrs:   Temp Pulse Resp BP SpO2   02/21/18 0824 97.6 °F (36.4 °C) 85 16 126/61 99 %   02/21/18 0735 98.8 °F (37.1 °C) 76 16 134/66 97 %   02/20/18 2023 98.9 °F (37.2 °C) 76 16 142/89 -   02/20/18 1522 98.1 °F (36.7 °C) 85 18 134/86 98 %     Wt Readings from Last 3 Encounters:   02/20/18 73.9 kg (163 lb)   12/20/17 79.4 kg (175 lb)   10/30/17 71 kg (156 lb 8.4 oz)     Temp Readings from Last 3 Encounters:   02/21/18 97.6 °F (36.4 °C)   12/20/17 97.5 °F (36.4 °C) (Oral)   08/08/17 98.1 °F (36.7 °C) (Oral)     BP Readings from Last 3 Encounters: 02/21/18 126/61   12/20/17 110/64   10/30/17 110/64     Pulse Readings from Last 3 Encounters:   02/21/18 85   12/20/17 60   10/30/17 (!) 57            DATA     LABORATORY DATA:(reviewed/updated 2/21/2018)  No results found for this or any previous visit (from the past 24 hour(s)). No results found for: VALF2, VALAC, VALP, VALPR, DS6, CRBAM, CRBAMP, CARB2, XCRBAM  No results found for: LITHM   RADIOLOGY REPORTS:(reviewed/updated 2/21/2018)  No results found.        MEDICATIONS     ALL MEDICATIONS:   Current Facility-Administered Medications   Medication Dose Route Frequency    ALPRAZolam (XANAX) tablet 0.5 mg  0.5 mg Oral TID WITH MEALS    FLUoxetine (PROzac) capsule 60 mg  60 mg Oral DAILY    QUEtiapine (SEROquel) tablet 50 mg  50 mg Oral TID    busPIRone (BUSPAR) tablet 20 mg  20 mg Oral TID    ziprasidone (GEODON) 20 mg in sterile water (preservative free) 1 mL injection  20 mg IntraMUSCular BID PRN    OLANZapine (ZyPREXA) tablet 5 mg  5 mg Oral Q6H PRN    benztropine (COGENTIN) tablet 2 mg  2 mg Oral BID PRN    benztropine (COGENTIN) injection 2 mg  2 mg IntraMUSCular BID PRN    LORazepam (ATIVAN) injection 2 mg  2 mg IntraMUSCular Q4H PRN    LORazepam (ATIVAN) tablet 1 mg  1 mg Oral Q4H PRN    acetaminophen (TYLENOL) tablet 650 mg  650 mg Oral Q4H PRN    ibuprofen (MOTRIN) tablet 400 mg  400 mg Oral Q8H PRN    magnesium hydroxide (MILK OF MAGNESIA) 400 mg/5 mL oral suspension 30 mL  30 mL Oral DAILY PRN    zolpidem (AMBIEN) tablet 5 mg  5 mg Oral QHS PRN      SCHEDULED MEDICATIONS:   Current Facility-Administered Medications   Medication Dose Route Frequency    ALPRAZolam (XANAX) tablet 0.5 mg  0.5 mg Oral TID WITH MEALS    FLUoxetine (PROzac) capsule 60 mg  60 mg Oral DAILY    QUEtiapine (SEROquel) tablet 50 mg  50 mg Oral TID    busPIRone (BUSPAR) tablet 20 mg  20 mg Oral TID          ASSESSMENT & PLAN     DIAGNOSES REQUIRING ACTIVE TREATMENT AND MONITORING: (reviewed/updated 2/21/2018)  Patient Active Hospital Problem List:   Dementia with behavioral disturbance (7/13/2017)    Assessment: cognitive, memory, verbal decline, ischemic vascular disease    Plan: namenda   Developmental delay (11/23/2015)    Assessment: intellectual functioning far behind chronological age    Plan: supportive psychotherapy            I will continue to monitor blood levels (Depakote, Tegretol, lithium, clozapine---a drug with a narrow therapeutic index= NTI) and associated labs for drug therapy implemented that require intense monitoring for toxicity as deemed appropriate based on current medication side effects and pharmacodynamically determined drug 1/2 lives. In summary, Patrick Johns, is a 75 y.o.  female who presents with a severe exacerbation of the principal diagnosis of Dementia with behavioral disturbance  Patient's condition is worsening/not improving/not stable . Patient requires continued inpatient hospitalization for further stabilization, safety monitoring and medication management. I will continue to coordinate the provision of individual, milieu, occupational, group, and substance abuse therapies to address target symptoms/diagnoses as deemed appropriate for the individual patient. A coordinated, multidisplinary treatment team round was conducted with the patient (this team consists of the nurse, psychiatric unit pharmcist,  and writer). Complete current electronic health record for patient has been reviewed today including consultant notes, ancillary staff notes, nurses and psychiatric tech notes. Suicide risk assessment completed and patient deemed to be of low risk for suicide at this time. The following regarding medications was addressed during rounds with patient:   the risks and benefits of the proposed medication. The patient was given the opportunity to ask questions. Informed consent given to the use of the above medications.  Will continue to adjust psychiatric and non-psychiatric medications (see above \"medication\" section and orders section for details) as deemed appropriate & based upon diagnoses and response to treatment. I will continue to order blood tests/labs and diagnostic tests as deemed appropriate and review results as they become available (see orders for details and above listed lab/test results). I will order psychiatric records from previous [facility] hospitals to further elucidate the nature of patient's psychopathology and review once available. I will gather additional collateral information from friends, family and o/p treatment team to further elucidate the nature of patient's psychopathology and baselline level of psychiatric functioning. I certify that this patient's inpatient psychiatric hospital services furnished since the previous certification were, and continue to be, required for treatment that could reasonably be expected to improve the patient's condition, or for diagnostic study, and that the patient continues to need, on a daily basis, active treatment furnished directly by or requiring the supervision of inpatient psychiatric facility personnel. In addition, the hospital records show that services furnished were intensive treatment services, admission or related services, or equivalent services.     EXPECTED DISCHARGE DATE/DAY: TBD     DISPOSITION: Home       Signed By:   Saqib Gallego MD  2/21/2018

## 2018-02-21 NOTE — BH NOTES
PSYCHOSOCIAL ASSESSMENT  :Patient identifying info:  Scarlet Jackson is a 75 y.o., female admitted 2018  3:23 PM     Presenting problem and precipitating factors: Patient was transferred to [place] [address] from [facility] ED under a TDO for making suicidal statements with a plan to stab herself with a knife. Pt lives in an adult group home and had been acting aggressively towards staff and peers. Pt made the statement about wanting to stab herself approximately 20 times and was taken to the ED. Pt's family believes Pt's medication regimen needs to be adjusted. Mental status assessment: Anxious    Current psychiatric providers and contact info: Dr. Sourav Benz; [facility]    Previous psychiatric services/providers and response to treatment: Long history of mental illness and intellectual disability; \"nervous breakdown\" at age 23    Family history of mental illness: Unknown    Substance abuse history:  None  Social History   Substance Use Topics    Smoking status: Never Smoker    Smokeless tobacco: Never Used    Alcohol use No       Family constellation: Brother, sister-in-law    Is significant other involved? No      Describe support system: Brother and sister-in-law    Describe living arrangements and home environment: Lives at [address]  Health issues:   Hospital Problems  Date Reviewed: 2017          Codes Class Noted POA    * (Principal)Dementia with behavioral disturbance ICD-10-CM: F03.91  ICD-9-CM: 294.21  2017 No        Developmental delay ICD-10-CM: R62.50  ICD-9-CM: 783.40  2015 Yes              Trauma history:  Mother passed away in 2017    Legal issues: None indicated    History of  service: No    Financial status: SSDI    Orthodoxy/cultural factors: Yazidi    Education/work history: Unemployed on disability    Have you been licensed as a dominga care professional (current or ): No  Leisure and recreation preferences: Spending time with friends and family, watching TV  Describe coping skills: Very limited    Lauren Markham  2/21/2018

## 2018-02-21 NOTE — PROGRESS NOTES
0738- Pt ambulates in spencer states \"I just fell. \" pt alert ambulating with steady gait. VS stable. Denies pain related to fall. Reports right knee and frontal area of head made contact with floor. Skin intact, no redness, swelling, or discoloration. 7975- pt reports mild mild back pain. Telephone order with read back: xray of debi and spine, hospitalist consult. 200- Hospitalist paged. Pt NAD. Post Fall Documentation      Scarlet Jackson unwitnessed fall occurred on 2/21/18 (Date) at 18 (Time). The answers to the following questions summarize the fall:     · In the patient's own words,:  · What was he/she doing when he/she fell? Getting out of bed    · What are his/her complaints? Mid back discomfort 4/10; states she has had pain for \"longtime\" prior to fall    · Nurse:  · Document observation, treatment, conversation, follow-up, and patient response. VS stable, ice pack offered, Xray spine and debi ordered. Hospitalist consult. · What was the patient's condition when found (i.e., pain, symptoms, cuts, bruises)? 4/10 pain mid back , no discoloration, skin intact, no edema     · What specific complaints did the patient have? Denies complaint     · What did the staff do when patient was found (i.e., vital signs, returned to bed with fall alarm, side rails up)? VS stable, pt visible on unit, education provided, bed alarm in place and working, fall wrist band placed, fall risk sign on door, xray ordered    · Which physician was notified?  Dr. Shen Harkins

## 2018-02-21 NOTE — BH NOTES
Pt attended reflections group. Discussed some unit procedures and coping skills. Pt attentive and interactive, childlike.

## 2018-02-21 NOTE — BH NOTES
GROUP THERAPY PROGRESS NOTE    Susan Arita is participating in [facility].      Group time: 15 minutes    Personal goal for participation: to feel better    Goal orientation: community    Group therapy participation: minimal    Therapeutic interventions reviewed and discussed: yes    Impression of participation: engaged

## 2018-02-21 NOTE — BH NOTES
Patient is alert and oriented. She is out on the unit smiling and appropriate with staff and peers. No signs of distress noted   Will continue to monitor.

## 2018-02-22 ENCOUNTER — APPOINTMENT (OUTPATIENT)
Dept: CT IMAGING | Age: 68
DRG: 885 | End: 2018-02-22
Attending: FAMILY MEDICINE
Payer: MEDICARE

## 2018-02-22 LAB
ALBUMIN SERPL-MCNC: 3.5 G/DL (ref 3.5–5)
ALBUMIN/GLOB SERPL: 0.9 {RATIO} (ref 1.1–2.2)
ALP SERPL-CCNC: 76 U/L (ref 45–117)
ALT SERPL-CCNC: 20 U/L (ref 12–78)
ANION GAP SERPL CALC-SCNC: 11 MMOL/L (ref 5–15)
AST SERPL-CCNC: 18 U/L (ref 15–37)
BILIRUB SERPL-MCNC: 0.8 MG/DL (ref 0.2–1)
BUN SERPL-MCNC: 16 MG/DL (ref 6–20)
BUN/CREAT SERPL: 21 (ref 12–20)
CALCIUM SERPL-MCNC: 9.1 MG/DL (ref 8.5–10.1)
CHLORIDE SERPL-SCNC: 105 MMOL/L (ref 97–108)
CHOLEST SERPL-MCNC: 219 MG/DL
CK SERPL-CCNC: 158 U/L (ref 26–192)
CO2 SERPL-SCNC: 21 MMOL/L (ref 21–32)
CREAT SERPL-MCNC: 0.78 MG/DL (ref 0.55–1.02)
ERYTHROCYTE [DISTWIDTH] IN BLOOD BY AUTOMATED COUNT: 13.5 % (ref 11.5–14.5)
EST. AVERAGE GLUCOSE BLD GHB EST-MCNC: 108 MG/DL
GLOBULIN SER CALC-MCNC: 4 G/DL (ref 2–4)
GLUCOSE SERPL-MCNC: 120 MG/DL (ref 65–100)
HBA1C MFR BLD: 5.4 % (ref 4.2–6.3)
HCT VFR BLD AUTO: 37.2 % (ref 35–47)
HDLC SERPL-MCNC: 66 MG/DL
HDLC SERPL: 3.3 {RATIO} (ref 0–5)
HGB BLD-MCNC: 12.6 G/DL (ref 11.5–16)
LDLC SERPL CALC-MCNC: 140.2 MG/DL (ref 0–100)
LIPID PROFILE,FLP: ABNORMAL
MCH RBC QN AUTO: 30.1 PG (ref 26–34)
MCHC RBC AUTO-ENTMCNC: 33.9 G/DL (ref 30–36.5)
MCV RBC AUTO: 89 FL (ref 80–99)
NRBC # BLD: 0 K/UL (ref 0–0.01)
NRBC BLD-RTO: 0 PER 100 WBC
PLATELET # BLD AUTO: 113 K/UL (ref 150–400)
PMV BLD AUTO: 12.2 FL (ref 8.9–12.9)
POTASSIUM SERPL-SCNC: 3.7 MMOL/L (ref 3.5–5.1)
PROT SERPL-MCNC: 7.5 G/DL (ref 6.4–8.2)
RBC # BLD AUTO: 4.18 M/UL (ref 3.8–5.2)
SODIUM SERPL-SCNC: 137 MMOL/L (ref 136–145)
TRIGL SERPL-MCNC: 64 MG/DL (ref ?–150)
TSH SERPL DL<=0.05 MIU/L-ACNC: 0.73 UIU/ML (ref 0.36–3.74)
VLDLC SERPL CALC-MCNC: 12.8 MG/DL
WBC # BLD AUTO: 6.3 K/UL (ref 3.6–11)

## 2018-02-22 PROCEDURE — 65220000003 HC RM SEMIPRIVATE PSYCH

## 2018-02-22 PROCEDURE — 74011250637 HC RX REV CODE- 250/637: Performed by: PSYCHIATRY & NEUROLOGY

## 2018-02-22 PROCEDURE — 36415 COLL VENOUS BLD VENIPUNCTURE: CPT | Performed by: PSYCHIATRY & NEUROLOGY

## 2018-02-22 PROCEDURE — 85027 COMPLETE CBC AUTOMATED: CPT | Performed by: PSYCHIATRY & NEUROLOGY

## 2018-02-22 PROCEDURE — 70450 CT HEAD/BRAIN W/O DYE: CPT

## 2018-02-22 PROCEDURE — 82550 ASSAY OF CK (CPK): CPT | Performed by: FAMILY MEDICINE

## 2018-02-22 PROCEDURE — 80053 COMPREHEN METABOLIC PANEL: CPT | Performed by: PSYCHIATRY & NEUROLOGY

## 2018-02-22 PROCEDURE — 80061 LIPID PANEL: CPT | Performed by: PSYCHIATRY & NEUROLOGY

## 2018-02-22 PROCEDURE — 83036 HEMOGLOBIN GLYCOSYLATED A1C: CPT | Performed by: PSYCHIATRY & NEUROLOGY

## 2018-02-22 PROCEDURE — 84443 ASSAY THYROID STIM HORMONE: CPT | Performed by: PSYCHIATRY & NEUROLOGY

## 2018-02-22 RX ORDER — HYDROXYZINE 25 MG/1
25 TABLET, FILM COATED ORAL 3 TIMES DAILY
Status: DISCONTINUED | OUTPATIENT
Start: 2018-02-22 | End: 2018-02-26 | Stop reason: HOSPADM

## 2018-02-22 RX ORDER — LORAZEPAM 2 MG/1
4 TABLET ORAL
Status: DISCONTINUED | OUTPATIENT
Start: 2018-02-22 | End: 2018-02-26 | Stop reason: HOSPADM

## 2018-02-22 RX ORDER — LORAZEPAM 0.5 MG/1
0.5 TABLET ORAL
Status: DISCONTINUED | OUTPATIENT
Start: 2018-02-22 | End: 2018-02-26 | Stop reason: HOSPADM

## 2018-02-22 RX ORDER — LORAZEPAM 2 MG/1
2 TABLET ORAL
Status: DISCONTINUED | OUTPATIENT
Start: 2018-02-22 | End: 2018-02-26 | Stop reason: HOSPADM

## 2018-02-22 RX ORDER — FOLIC ACID 1 MG/1
1 TABLET ORAL DAILY
Status: DISCONTINUED | OUTPATIENT
Start: 2018-02-23 | End: 2018-02-26 | Stop reason: HOSPADM

## 2018-02-22 RX ORDER — LANOLIN ALCOHOL/MO/W.PET/CERES
100 CREAM (GRAM) TOPICAL DAILY
Status: DISCONTINUED | OUTPATIENT
Start: 2018-02-23 | End: 2018-02-26 | Stop reason: HOSPADM

## 2018-02-22 RX ADMIN — HYDROXYZINE HYDROCHLORIDE 25 MG: 25 TABLET ORAL at 18:02

## 2018-02-22 RX ADMIN — QUETIAPINE 50 MG: 25 TABLET ORAL at 17:02

## 2018-02-22 RX ADMIN — BUSPIRONE HYDROCHLORIDE 20 MG: 10 TABLET ORAL at 18:02

## 2018-02-22 RX ADMIN — QUETIAPINE 50 MG: 25 TABLET ORAL at 22:14

## 2018-02-22 RX ADMIN — HYDROXYZINE HYDROCHLORIDE 25 MG: 25 TABLET ORAL at 21:15

## 2018-02-22 RX ADMIN — QUETIAPINE 50 MG: 25 TABLET ORAL at 09:26

## 2018-02-22 RX ADMIN — ALPRAZOLAM 0.5 MG: 1 TABLET ORAL at 11:30

## 2018-02-22 RX ADMIN — FLUOXETINE HYDROCHLORIDE 60 MG: 20 CAPSULE ORAL at 09:26

## 2018-02-22 RX ADMIN — ALPRAZOLAM 0.5 MG: 1 TABLET ORAL at 09:26

## 2018-02-22 RX ADMIN — BUSPIRONE HYDROCHLORIDE 20 MG: 10 TABLET ORAL at 09:26

## 2018-02-22 RX ADMIN — BUSPIRONE HYDROCHLORIDE 20 MG: 10 TABLET ORAL at 21:54

## 2018-02-22 NOTE — INTERDISCIPLINARY ROUNDS
Behavioral Health Interdisciplinary Rounds     Patient Name: Mekhi Jacobsen  Age: 75 y.o. Room/Bed:  727/01  Primary Diagnosis: Dementia with behavioral disturbance   Admission Status: TDO to be today at 0730     Readmission within 30 days:   Power of  in place: family to bring paper regarding POA this morning at 0730   Patient requires a blocked bed: no          Reason for blocked bed: na    VTE Prophylaxis:   Flu vaccine given :    Mobility needs/Fall risk: no    Nutritional Plan: no  Consults: Hospitalist for H&P         Labs/Testing due today?: yes    Sleep hours:  8+      Participation in Care/Groups:  no  Medication Compliant?: Yes  PRNS (last 24 hours): Pain    Restraints (last 24 hours):  no  Substance Abuse:  no  CIWA (range last 24 hours): na COWS (range last 24 hours): na  Alcohol screening (AUDIT) completed -  AUDIT Score: 0  If applicable, date SBIRT discussed in treatment team AND documented: na  Tobacco - patient is a smoker: no   Date tobacco education completed by RN: aaron  24 hour chart check complete: yes     Patient goal(s) for today: Attend groups  Treatment team focus/goals: Titrate medications  Progress note: Patient sleeping during treatment team.  Patient reported feeling grief over mother's death.     LOS:  2  Expected LOS: TBD    Financial concerns/prescription coverage: Medicare; Medicaid  Date of last family contact: 2/21 Nursing spoke to brother/POA      Family requesting physician contact today: No  Discharge plan: Return to group home  Guns in the home: No        Outpatient provider(s): [facility]    Participating treatment team members: CECELIA Vegas; Dr. Stephan Robertson MD; Whit Blank RN

## 2018-02-22 NOTE — PROGRESS NOTES
Problem: Depressed Mood (Adult/Pediatric)  Goal: *STG: Participates in treatment plan  Outcome: Progressing Towards Goal  Pt has been taking all scheduled medications. She has not required any PRN medications. Pt has been appropriate in interactions with staff and peers.

## 2018-02-22 NOTE — H&P
History & Physical    Date of admission: 2018    Patient name: Bozena Olivares  MRN: [id number]  YOB: 1950  Age: 75 y.o. Primary care provider:  Vincenzo Weiss MD     Source of Information: patient and medical records                              Chief complaint:  Patient does not provide    History of present illness  Bozena Olivares is a 75 y.o. female who presents with past medical history of hypertension, GERD, falls, osteopenia, thrombocytopenia, memory disorder (unspecified), anxiety and diverticulosis presented for admission on 2018 with diagnosis of behavioral disturbance. Patient is a poor historian, currently confused and not answering questions appropriately. As such, I have obtained history per my review of patient's medical records. Patient was transferred from [facility] with reported change in her behavior and making suicidal statement. Patient reportedly lives at [facility], [facility]. Patient's sister in law reportedly had noted patient being increasingly defiant and acting out, such as sitting on floor refusing to participate in group activities; increased rudeness and irritability with staff and making comments such as \"I hate you\" and \"I hate myself\". Per report, patient's change in behaviors and increase anxiety started at same the time her mother . Patient was transferred to [facility] behavioral health unit for progression of care. There were no reports of new onset syncope, loss of consciousness, headache, neck pain, visual disturbance, numbness, paresthesias, focal weakness, chest pain, shortness of breath, palpitations, abdominal pain, nausea, vomiting, diarrhea, melena, dysuria, hematuri,  calf pain, increased leg swelling/ edema, fever, chills.      Past Medical History:   Diagnosis Date    Allergic rhinitis, cause unspecified 7/15/2010    Anxiety     Decreased bone density 07/24/09    Dexa Scan     Detached retina     Diverticulosis     Falls     GERD (gastroesophageal reflux disease)     HTN (hypertension)     Memory disorder     Menorrhagia     Mental retardation     Muscle weakness     Osteopenia     Other and unspecified hyperlipidemia     S/P colonoscopy 03/01/08    Shortness of breath     Snoring     Thrombocytopenia (HCC)       Past Surgical History:   Procedure Laterality Date    COLONOSCOPY  3-5-2008    HX CATARACT REMOVAL      HX OVARIAN CYST REMOVAL      HX POLYPECTOMY      ablation       MEDICATIONS:  Prior to Admission medications    Medication Sig Start Date End Date Taking? Authorizing Provider   alendronate (FOSAMAX) 70 mg tablet TAKE ONE TABLET WEEKLY ON FRIDAY 30 MINUTES BEFORE 1ST MEAL WITH FULLGLASS OF WATER. DO NOT LIE DOWN FOR 30 MINUTES AFTER TAKING 12/30/17  Yes Rolando Mcdaniels MD   DOC-Q-LACE 100 mg capsule TAKE (1) CAPSULE BY MOUTH DAILY 12/13/17  Yes Rolando Mcdaniels MD   cyanocobalamin (VITAMIN B-12) 1,000 mcg tablet TAKE 1 TABLET BY MOUTH DAILY 10/13/17  Yes Rolando Mcdaniels MD   calcium carbonate (CALTREX) 600 mg calcium (1,500 mg) tablet TAKE 1 TABLET BY MOUTH TWICE A DAY 7/14/17  Yes Rolando Mcdaniels MD   loratadine (CLARITIN) 10 mg tablet TAKE 1 TABLET BY MOUTH DAILY FOR ALLERGIES 7/14/17  Yes Rolando Mcdaniels MD   esomeprazole (NEXIUM) 40 mg capsule TAKE (1) CAPSULE BY MOUTH DAILY 7/14/17  Yes Rolando Mcdaniels MD   ergocalciferol (VITAMIN D2) 50,000 unit capsule TAKE 1 CAPSULE ONCE A WEEK ON FRIDAY. 6/28/17  Yes Rolando Mcdaniels MD   inulin (FIBER CHOICE, INULIN,) 1.5 gram chew Take 1 Tab by mouth two (2) times a day. 6/28/17  Yes Rolando Mcdaniels MD   FLUOXETINE HCL (FLUOXETINE PO) Take 60 mg by mouth. Yes Historical Provider   busPIRone (BUSPAR) 15 mg tablet Take 15 mg by mouth three (3) times daily.    Yes Historical Provider   polyethylene glycol (MIRALAX) 17 gram/dose powder MIX 1 CAPFUL (=17GM) IN A GLASS OF WATER & DRINK ONCE DAILY AS NEEDEDFOR CONSTIPATION 1/6/15  Yes Pippa Grant MD   ALPRAZolam Leslie Goldberg) 0.25 mg tablet Take 0.5 mg by mouth three (3) times daily (with meals). Yes Historical Provider   Fiber (FIBER CHOICE) Chew Take 1 Tab by mouth two (2) times a day. 7/28/10  Yes    QUEtiapine (SEROQUEL) 25 mg tablet Take 50 mg by mouth two (2) times a day. Historical Provider       ALLERGIES:  NO KNOWN DRUG ALLERGIES    FAMILY HISTORY:   Family History   Problem Relation Age of Onset    Diabetes Father     Heart Disease Father          Social history  Patient resides    Independently      With family care      Assisted living      SNF    Ambulates  X  Independently                 Alcohol history   X  None reported           Smoking history  X  None reported             History   Smoking Status    Never Smoker   Smokeless Tobacco    Never Used       Code status  x  Full code     Review of systems  All systems reviewed; pertinent positives were as noted in HPI, otherwise negative. Physical Examination   Visit Vitals    /75 (BP Patient Position: At rest)    Pulse 67    Temp 98 °F (36.7 °C)    Resp 18    Wt 73.9 kg (163 lb)    SpO2 96%    Breastfeeding No    BMI 32.92 kg/m2          O2 Device: Room air    General:  Disheveled appearing female in no acute respiratory distress   Head:  Normocephalic, without obvious abnormality, atraumatic   Eyes:  Conjunctivae/corneas clear. PERRL, EOMs intact   E/N/M/T: Nares normal. Septum midline.  No nasal drainage or sinus tenderness  Tongue midline/ non-edematous  Clear oropharynx   Neck: Normal appearance and movements, symmetrical, trachea midline  No palpable adenopathy  No thyroid enlargement, tenderness or nodules  No carotid bruit   No JVD   Lungs:   Symmetrical chest expansion and respiratory effort  Clear to auscultation bilaterally   Chest wall:  No tenderness or deformity   Heart:  Regular rate and rhythm   Sounds normal; no murmur, click, rub or gallop   Abdomen:   Soft, no tenderness  No rebound, guarding, or rigidity  Non-distended  Bowel sounds normal  No masses or hepatosplenomegaly  No hernias present   Back: No CVA tenderness   Extremities: Extremities normal, atraumatic  No cyanosis   No edema     Pulses 2+ radial/ 1 + DP bilateral pulses   Skin: Rash under abdominal pannus  Warm/ dry   Musculo-      skeletal: Gait not tested  No calf tenderness   Neuro: GCS 13 (E4 V3 M6). Moves all extremities x 4. No slurred speech. No facial droop. Sensation grossly intact. Psych: Patient disoriented, confused, anxious, agitated. Not answering questions appropriately           Data Review      24 Hour Results:  Recent Results (from the past 24 hour(s))   CBC W/O DIFF    Collection Time: 02/22/18  6:01 AM   Result Value Ref Range    WBC 6.3 3.6 - 11.0 K/uL    RBC 4.18 3.80 - 5.20 M/uL    HGB 12.6 11.5 - 16.0 g/dL    HCT 37.2 35.0 - 47.0 %    MCV 89.0 80.0 - 99.0 FL    MCH 30.1 26.0 - 34.0 PG    MCHC 33.9 30.0 - 36.5 g/dL    RDW 13.5 11.5 - 14.5 %    PLATELET 391 (L) 242 - 400 K/uL    MPV 12.2 8.9 - 12.9 FL    NRBC 0.0 0  WBC    ABSOLUTE NRBC 0.00 0.00 - 0.01 K/uL     Recent Labs      02/22/18   0601   WBC  6.3   HGB  12.6   HCT  37.2   PLT  113*     No results for input(s): NA, K, CL, CO2, GLU, BUN, CREA, CA, MG, PHOS, ALB, TBIL, TBILI, SGOT, ALT, INR in the last 72 hours. No lab exists for component: INREXT        Assessment and Plan   1. Dementia with behavioral disturbance. Admitted to psychiatry service for continued evaluation and treatments. 2.  Developmental delay. Plan as noted above. 3.  Thrombocytopenia. Repeat platelet count. 4.   Constipation. Continue bowel regimen. 5.   Rash. Order Nicotine powder to affected areas of rash BID. 6.   Anxiety. Plan as above. 7.   Fall. Patient reports that she fell yesterday.   No significant injuries noted on exam but will place order for CT head to rule out any acute intracranial process. Will place order for fall precautions. Would likely benefit from PT.          Signed by: Jefe Sahu MD    February 22, 2018 at 6:37 AM

## 2018-02-22 NOTE — BH NOTES
Received pt on unit. No voiced complaints or acute distress at present. Pleasant and cooperative.   Sl anxiety but redirectable

## 2018-02-23 LAB — GLUCOSE P FAST SERPL-MCNC: 98 MG/DL (ref 65–100)

## 2018-02-23 PROCEDURE — 65220000003 HC RM SEMIPRIVATE PSYCH

## 2018-02-23 PROCEDURE — 82947 ASSAY GLUCOSE BLOOD QUANT: CPT | Performed by: PSYCHIATRY & NEUROLOGY

## 2018-02-23 PROCEDURE — 74011250637 HC RX REV CODE- 250/637: Performed by: PSYCHIATRY & NEUROLOGY

## 2018-02-23 PROCEDURE — 36415 COLL VENOUS BLD VENIPUNCTURE: CPT | Performed by: PSYCHIATRY & NEUROLOGY

## 2018-02-23 RX ORDER — HYDROXYZINE 25 MG/1
25 TABLET, FILM COATED ORAL 3 TIMES DAILY
Qty: 90 TAB | Refills: 0 | Status: SHIPPED | OUTPATIENT
Start: 2018-02-23 | End: 2018-03-05

## 2018-02-23 RX ORDER — BUSPIRONE HYDROCHLORIDE 10 MG/1
20 TABLET ORAL 3 TIMES DAILY
Qty: 90 TAB | Refills: 0 | Status: SHIPPED | OUTPATIENT
Start: 2018-02-23

## 2018-02-23 RX ORDER — FLUOXETINE HYDROCHLORIDE 20 MG/1
60 CAPSULE ORAL DAILY
Qty: 90 CAP | Refills: 0 | Status: SHIPPED | OUTPATIENT
Start: 2018-02-24 | End: 2022-06-07 | Stop reason: DRUGHIGH

## 2018-02-23 RX ORDER — QUETIAPINE FUMARATE 50 MG/1
50 TABLET, FILM COATED ORAL 3 TIMES DAILY
Qty: 90 TAB | Refills: 0 | Status: SHIPPED | OUTPATIENT
Start: 2018-02-23 | End: 2022-06-07 | Stop reason: DRUGHIGH

## 2018-02-23 RX ADMIN — FLUOXETINE HYDROCHLORIDE 60 MG: 20 CAPSULE ORAL at 09:20

## 2018-02-23 RX ADMIN — BUSPIRONE HYDROCHLORIDE 20 MG: 10 TABLET ORAL at 22:29

## 2018-02-23 RX ADMIN — BUSPIRONE HYDROCHLORIDE 20 MG: 10 TABLET ORAL at 17:39

## 2018-02-23 RX ADMIN — HYDROXYZINE HYDROCHLORIDE 25 MG: 25 TABLET ORAL at 09:20

## 2018-02-23 RX ADMIN — BUSPIRONE HYDROCHLORIDE 20 MG: 10 TABLET ORAL at 09:20

## 2018-02-23 RX ADMIN — HYDROXYZINE HYDROCHLORIDE 25 MG: 25 TABLET ORAL at 18:00

## 2018-02-23 RX ADMIN — QUETIAPINE 50 MG: 25 TABLET ORAL at 17:00

## 2018-02-23 RX ADMIN — QUETIAPINE 50 MG: 25 TABLET ORAL at 09:20

## 2018-02-23 RX ADMIN — Medication 100 MG: at 09:21

## 2018-02-23 RX ADMIN — FOLIC ACID 1 MG: 1 TABLET ORAL at 09:20

## 2018-02-23 RX ADMIN — HYDROXYZINE HYDROCHLORIDE 25 MG: 25 TABLET ORAL at 22:29

## 2018-02-23 RX ADMIN — QUETIAPINE 50 MG: 25 TABLET ORAL at 21:30

## 2018-02-23 NOTE — PROGRESS NOTES
Problem: Depressed Mood (Adult/Pediatric)  Goal: *STG: Attends activities and groups  Outcome: Progressing Towards Goal  Attends unit recreational activities , however, did not attend Reflections. VSS. Ate 100% dinner and snacks. Incontinent of urine. Showered with min. Assist.  Her  came to visit at [phone] and reported \"I want to be involved with her discharge planning\". Moose Odell was called and she spoke with her. Med compliant. Quiet. Cooperative.

## 2018-02-23 NOTE — PROGRESS NOTES
Problem: Depressed Mood (Adult/Pediatric)  Goal: *STG: Participates in treatment plan  Outcome: Progressing Towards Goal  Review meds, out on unit social with peers and staff. Mood and affect bright. Demonstrates appropriate behaviors, ability to follow staff direction and unit routine.

## 2018-02-23 NOTE — BH NOTES
Received pt on unit. No voiced complaints or acute distress at present. Pleasant and cooperative.   Pt continues to be redirectable

## 2018-02-23 NOTE — BH NOTES
GROUP THERAPY PROGRESS NOTE    Faith Ortiz is participating in Discharge Planning Group    Group time: 1 hour    Personal goal for participation: Creating Wellness     Goal orientation: Developing Wellness Recovery Action Plan    Group therapy participation: minimal    Therapeutic interventions reviewed and discussed:     Impression of participation: Pt was attentive, able to focus on topics being dicussed and remained  in the group for the duration of the video and rap up.

## 2018-02-23 NOTE — INTERDISCIPLINARY ROUNDS
Behavioral Health Interdisciplinary Rounds     Patient Name: Cara Sims  Age: 75 y.o. Room/Bed:  727/01  Primary Diagnosis: Dementia with behavioral disturbance   Admission Status: Voluntary     Readmission within 30 days:   Power of  in place:   Patient requires a blocked bed: no          Reason for blocked bed: na    VTE Prophylaxis: no  Flu vaccine given :  no  Mobility needs/Fall risk: no    Nutritional Plan: no  Consults:          Labs/Testing due today?: yes    Sleep hours:  7.5+      Participation in Care/Groups:  yes  Medication Compliant?: Yes  PRNS (last 24 hours): None    Restraints (last 24 hours):  no  Substance Abuse:  no  CIWA (range last 24 hours): na COWS (range last 24 hours): na  Alcohol screening (AUDIT) completed -  AUDIT Score: 0  If applicable, date SBIRT discussed in treatment team AND documented: na  Tobacco - patient is a smoker: no   Date tobacco education completed by RN: aaron  24 hour chart check complete: yes     Patient goal(s) for today: Engage in unit activities  Treatment team focus/goals: Meet with CM; discuss discharge plan  Progress note: Patient continues to be appropriate on the unit.      LOS:  3  Expected LOS: 6    Financial concerns/prescription coverage:  Medicare; Medicaid  Date of last family contact: 2/23 SW met with CM    Family requesting physician contact today:  no  Discharge plan: Return to group home  Guns in the home: no        Outpatient provider(s): [facility]    Participating treatment team members: CECELIA Shankar; Dr. Mamta Loera MD; Jayy Rodriguez, RN

## 2018-02-23 NOTE — BH NOTES
GROUP THERAPY PROGRESS NOTE    Cara Bethany did not participate in a morning Process Group on the Geriatric Unit with a focus on shifting ones feelings to a positive note and preparing for the day through group singing.

## 2018-02-23 NOTE — BH NOTES
PSYCHIATRIC PROGRESS NOTE         Patient Name  Scarlet Jackson   Date of Birth 1950   [facility] [id number]   Medical Record Number  [id number]      Age  75 y.o. PCP King Ana MD   Admit date:  2/20/2018    Room Number  727/01  @ [facility]   Date of Service  2/23/2018          PSYCHOTHERAPY SESSION NOTE:  Length of psychotherapy session: 30 minutes    Main condition/diagnosis/issues treated during session today, 2/23/2018 : behavioral control, anger management, treatment compliance    I employed Cognitive Behavioral therapy techniques, Reality-Oriented psychotherapy, as well as supportive psychotherapy in regards to various ongoing psychosocial stressors, including the following: pre-admission and current problems; housing issues;  stress of hospitalization. Interpersonal relationship issues and psychodynamic conflicts explored. Attempts made to alleviate maladaptive patterns. We, also, worked on issues of denial & effects of substance dependency/use     Overall, patient is not progressing    Treatment Plan Update (reviewed an updated 2/23/2018) : I will modify psychotherapy tx plan by implementing more stress management strategies, building upon cognitive behavioral techniques, increasing coping skills, as well as shoring up psychological defenses). An extended energy and skill set was needed to engage pt in psychotherapy due to some of the following: resistiveness, complexity, negativity, confrontational nature, hostile behaviors, and/or severe abnormalities in thought processes/psychosis resulting in the loss of expressive/receptive language communication skills. E & M PROGRESS NOTE:         HISTORY       CC:  \"behavioral problems\"  HISTORY OF PRESENT ILLNESS/INTERVAL HISTORY:  (reviewed/updated 2/23/2018). per initial evaluation:     Scarlet Jackson presents/reports/evidences the following emotional symptoms today, 2/23/2018:agitation.  The above symptoms have been present for years . These symptoms are of acute severity. The symptoms are intermittent/ fleeting in nature. Additional symptomatology and features include anxiety. 2/23/18- Cheerful and pleasant. Absolutely no aggressive behavior. Is quite redirectable and appropriate with staff and peers.-      SIDE EFFECTS: (reviewed/updated 2/23/2018)  None reported or admitted to. No noted toxicity with use of Depakote/Tegretol/lithium/Clozaril/TCAs   ALLERGIES:(reviewed/updated 2/23/2018)  No Known Allergies   MEDICATIONS PRIOR TO ADMISSION:(reviewed/updated 2/23/2018)  Prescriptions Prior to Admission   Medication Sig    alendronate (FOSAMAX) 70 mg tablet TAKE ONE TABLET WEEKLY ON FRIDAY 30 MINUTES BEFORE 1ST MEAL WITH FULLGLASS OF WATER. DO NOT LIE DOWN FOR 30 MINUTES AFTER TAKING    DOC-Q-LACE 100 mg capsule TAKE (1) CAPSULE BY MOUTH DAILY    cyanocobalamin (VITAMIN B-12) 1,000 mcg tablet TAKE 1 TABLET BY MOUTH DAILY    calcium carbonate (CALTREX) 600 mg calcium (1,500 mg) tablet TAKE 1 TABLET BY MOUTH TWICE A DAY    loratadine (CLARITIN) 10 mg tablet TAKE 1 TABLET BY MOUTH DAILY FOR ALLERGIES    esomeprazole (NEXIUM) 40 mg capsule TAKE (1) CAPSULE BY MOUTH DAILY    ergocalciferol (VITAMIN D2) 50,000 unit capsule TAKE 1 CAPSULE ONCE A WEEK ON FRIDAY.  inulin (FIBER CHOICE, INULIN,) 1.5 gram chew Take 1 Tab by mouth two (2) times a day.  FLUOXETINE HCL (FLUOXETINE PO) Take 60 mg by mouth.  busPIRone (BUSPAR) 15 mg tablet Take 15 mg by mouth three (3) times daily.  polyethylene glycol (MIRALAX) 17 gram/dose powder MIX 1 CAPFUL (=17GM) IN A GLASS OF WATER & DRINK ONCE DAILY AS NEEDEDFOR CONSTIPATION    ALPRAZolam (XANAX) 0.25 mg tablet Take 0.5 mg by mouth three (3) times daily (with meals).  Fiber (FIBER CHOICE) Chew Take 1 Tab by mouth two (2) times a day.  QUEtiapine (SEROQUEL) 25 mg tablet Take 50 mg by mouth two (2) times a day.       PAST MEDICAL HISTORY: Past medical history from the initial psychiatric evaluation has been reviewed (reviewed/updated 2/23/2018) with no additional updates (I asked patient and no additional past medical history provided). Past Medical History:   Diagnosis Date    Allergic rhinitis, cause unspecified 7/15/2010    Anxiety     Decreased bone density 07/24/09    Dexa Scan     Detached retina     Diverticulosis     Falls     GERD (gastroesophageal reflux disease)     HTN (hypertension)     Memory disorder     Menorrhagia     Mental retardation     Muscle weakness     Osteopenia     Other and unspecified hyperlipidemia     S/P colonoscopy 03/01/08    Shortness of breath     Snoring     Thrombocytopenia (HCC)      Past Surgical History:   Procedure Laterality Date    COLONOSCOPY  3-5-2008    HX CATARACT REMOVAL      HX OVARIAN CYST REMOVAL      HX POLYPECTOMY      ablation      SOCIAL HISTORY: Social history from the initial psychiatric evaluation has been reviewed (reviewed/updated 2/23/2018) with no additional updates (I asked patient and no additional social history provided). Social History     Social History    Marital status: SINGLE     Spouse name: N/A    Number of children: N/A    Years of education: N/A     Occupational History    Not on file. Social History Main Topics    Smoking status: Never Smoker    Smokeless tobacco: Never Used    Alcohol use No    Drug use: No    Sexual activity: No     Other Topics Concern    Not on file     Social History Narrative    75year old  female ad,itted from group home for peroidic agitation. Pt is ID and also has dementia. FAMILY HISTORY: Family history from the initial psychiatric evaluation has been reviewed (reviewed/updated 2/23/2018) with no additional updates (I asked patient and no additional family history provided).    Family History   Problem Relation Age of Onset    Diabetes Father     Heart Disease Father        REVIEW OF SYSTEMS: (reviewed/updated 2/23/2018)  Appetite:no change from normal   Sleep: no change   All other Review of Systems: Psychological ROS: positive for - behavioral disorder  Respiratory ROS: no cough, shortness of breath, or wheezing  Cardiovascular ROS: no chest pain or dyspnea on exertion         [address] [facility] (MSE):    MSE FINDINGS ARE WITHIN NORMAL LIMITS (WNL) UNLESS OTHERWISE STATED BELOW. ( ALL OF THE BELOW CATEGORIES OF THE MSE HAVE BEEN REVIEWED (reviewed 2/23/2018) AND UPDATED AS DEEMED APPROPRIATE )  General Presentation age appropriate, cooperative   Orientation Alert and Oriented x 1   Vital Signs  See below (reviewed 2/23/2018); Vital Signs (BP, Pulse, & Temp) are within normal limits if not listed below.    Gait and Station Stable/steady, no ataxia   Musculoskeletal System No extrapyramidal symptoms (EPS); no abnormal muscular movements or Tardive Dyskinesia (TD); muscle strength and tone are within normal limits   Language No aphasia or dysarthria   Speech:  hyperverbal   Thought Processes Primitive  slow rate of thoughts; poor abstract reasoning/computation   Thought Associations circumstantial   Thought Content not internally preoccupied   Suicidal Ideations none   Homicidal Ideations none   Mood:  stable    Affect:  stable   Memory recent  impaired   Memory remote:  impaired   Concentration/Attention:  distractable   Fund of Knowledge significantly below average   Insight:  poor   Reliability poor   Judgment:  poor          VITALS:     Patient Vitals for the past 24 hrs:   Temp Pulse Resp BP SpO2   02/23/18 1543 97.7 °F (36.5 °C) 73 16 136/83 97 %   02/23/18 1200 98.2 °F (36.8 °C) 75 16 143/82 97 %   02/23/18 0834 97.3 °F (36.3 °C) 64 16 124/76 99 %   02/22/18 2002 97.6 °F (36.4 °C) 69 16 101/65 -     Wt Readings from Last 3 Encounters:   02/20/18 73.9 kg (163 lb)   12/20/17 79.4 kg (175 lb)   10/30/17 71 kg (156 lb 8.4 oz)     Temp Readings from Last 3 Encounters: 02/23/18 97.7 °F (36.5 °C)   12/20/17 97.5 °F (36.4 °C) (Oral)   08/08/17 98.1 °F (36.7 °C) (Oral)     BP Readings from Last 3 Encounters:   02/23/18 136/83   12/20/17 110/64   10/30/17 110/64     Pulse Readings from Last 3 Encounters:   02/23/18 73   12/20/17 60   10/30/17 (!) 57            DATA     LABORATORY DATA:(reviewed/updated 2/23/2018)  Recent Results (from the past 24 hour(s))   GLUCOSE, FASTING    Collection Time: 02/23/18  6:45 AM   Result Value Ref Range    Glucose 98 65 - 100 MG/DL     No results found for: VALF2, VALAC, VALP, VALPR, DS6, CRBAM, CRBAMP, CARB2, XCRBAM  No results found for: LITHM   RADIOLOGY REPORTS:(reviewed/updated 2/23/2018)  No results found.        MEDICATIONS     ALL MEDICATIONS:   Current Facility-Administered Medications   Medication Dose Route Frequency    folic acid (FOLVITE) tablet 1 mg  1 mg Oral DAILY    thiamine (B-1) tablet 100 mg  100 mg Oral DAILY    LORazepam (ATIVAN) tablet 2 mg  2 mg Oral Q1H PRN    LORazepam (ATIVAN) tablet 4 mg  4 mg Oral Q1H PRN    hydrOXYzine HCl (ATARAX) tablet 25 mg  25 mg Oral TID    LORazepam (ATIVAN) tablet 0.5 mg  0.5 mg Oral BID PRN    FLUoxetine (PROzac) capsule 60 mg  60 mg Oral DAILY    QUEtiapine (SEROquel) tablet 50 mg  50 mg Oral TID    busPIRone (BUSPAR) tablet 20 mg  20 mg Oral TID    ziprasidone (GEODON) 20 mg in sterile water (preservative free) 1 mL injection  20 mg IntraMUSCular BID PRN    OLANZapine (ZyPREXA) tablet 5 mg  5 mg Oral Q6H PRN    benztropine (COGENTIN) tablet 2 mg  2 mg Oral BID PRN    benztropine (COGENTIN) injection 2 mg  2 mg IntraMUSCular BID PRN    LORazepam (ATIVAN) injection 2 mg  2 mg IntraMUSCular Q4H PRN    acetaminophen (TYLENOL) tablet 650 mg  650 mg Oral Q4H PRN    ibuprofen (MOTRIN) tablet 400 mg  400 mg Oral Q8H PRN    magnesium hydroxide (MILK OF MAGNESIA) 400 mg/5 mL oral suspension 30 mL  30 mL Oral DAILY PRN    zolpidem (AMBIEN) tablet 5 mg  5 mg Oral QHS PRN      SCHEDULED MEDICATIONS:   Current Facility-Administered Medications   Medication Dose Route Frequency    folic acid (FOLVITE) tablet 1 mg  1 mg Oral DAILY    thiamine (B-1) tablet 100 mg  100 mg Oral DAILY    hydrOXYzine HCl (ATARAX) tablet 25 mg  25 mg Oral TID    FLUoxetine (PROzac) capsule 60 mg  60 mg Oral DAILY    QUEtiapine (SEROquel) tablet 50 mg  50 mg Oral TID    busPIRone (BUSPAR) tablet 20 mg  20 mg Oral TID          ASSESSMENT & PLAN     DIAGNOSES REQUIRING ACTIVE TREATMENT AND MONITORING: (reviewed/updated 2/23/2018)  Patient Active Hospital Problem List:   Dementia with behavioral disturbance (7/13/2017)    Assessment: cognitive, memory, verbal decline, ischemic vascular disease    Plan: namenda   Developmental delay (11/23/2015)    Assessment: intellectual functioning far behind chronological age    Plan: supportive psychotherapy            I will continue to monitor blood levels (Depakote, Tegretol, lithium, clozapine---a drug with a narrow therapeutic index= NTI) and associated labs for drug therapy implemented that require intense monitoring for toxicity as deemed appropriate based on current medication side effects and pharmacodynamically determined drug 1/2 lives. In summary, Je Avendano, is a 75 y.o.  female who presents with a severe exacerbation of the principal diagnosis of Dementia with behavioral disturbance  Patient's condition is worsening/not improving/not stable . Patient requires continued inpatient hospitalization for further stabilization, safety monitoring and medication management. I will continue to coordinate the provision of individual, milieu, occupational, group, and substance abuse therapies to address target symptoms/diagnoses as deemed appropriate for the individual patient. A coordinated, multidisplinary treatment team round was conducted with the patient (this team consists of the nurse, psychiatric unit pharmcist,  and writer).      Complete current electronic health record for patient has been reviewed today including consultant notes, ancillary staff notes, nurses and psychiatric tech notes. Suicide risk assessment completed and patient deemed to be of low risk for suicide at this time. The following regarding medications was addressed during rounds with patient:   the risks and benefits of the proposed medication. The patient was given the opportunity to ask questions. Informed consent given to the use of the above medications. Will continue to adjust psychiatric and non-psychiatric medications (see above \"medication\" section and orders section for details) as deemed appropriate & based upon diagnoses and response to treatment. I will continue to order blood tests/labs and diagnostic tests as deemed appropriate and review results as they become available (see orders for details and above listed lab/test results). I will order psychiatric records from previous [facility] hospitals to further elucidate the nature of patient's psychopathology and review once available. I will gather additional collateral information from friends, family and o/p treatment team to further elucidate the nature of patient's psychopathology and baselline level of psychiatric functioning. I certify that this patient's inpatient psychiatric hospital services furnished since the previous certification were, and continue to be, required for treatment that could reasonably be expected to improve the patient's condition, or for diagnostic study, and that the patient continues to need, on a daily basis, active treatment furnished directly by or requiring the supervision of inpatient psychiatric facility personnel. In addition, the hospital records show that services furnished were intensive treatment services, admission or related services, or equivalent services.     EXPECTED DISCHARGE DATE/DAY: TBD     DISPOSITION: Home       Signed By:   Tin Chand MD  2/23/2018

## 2018-02-23 NOTE — BH NOTES
GROUP THERAPY PROGRESS NOTE    Lauro Mode is participating in Reflections. Group time: 15 minutes    Personal goal for participation: daily progress    Goal orientation: personal    Group therapy participation: minimal    Therapeutic interventions reviewed and discussed: yes    Impression of participation: Seems in fair spirits. Voiced no concerns.

## 2018-02-23 NOTE — BH NOTES
GROUP THERAPY PROGRESS NOTE    Dorothy Burroughs [Edith] participated in a Process Group with a focus identifying feelings, planning for the rest of the day, and reviewing DBT skills for managing emotional distress. Group time: 60 minutes. Personal goal for participation: To increase the capacity to improve ones mood, structure, and coping capacity. Goal orientation: The patient will be able to identify their feelings, develop a plan for structuring their day, and begin to appreciate the possibility of successfully managing distress and other forms of intense emotions. Group therapy participation: With prompting, this patient participated in the group. Therapeutic interventions reviewed and discussed: The group members were asked to introduce themselves to each other and to see if they could identify an emotion they are having and/or let the group know what they want to focus on for the day as they continue to make discharge plans. The group members also reviewed five suggested areas of DBT options when experiencing intense emotions: distraction, improve the moment, self-soothe, pros and cons, and radical acceptance. They were asked to take turns reading from the handout and discussing its contents. At the end of group the patients were provided a summary of the topics covered. Impression of participation: The patient said she wanted to be called \"Edith. \" She said she wanted to go back to her group home. She expressed no current SI/HI and displayed no overt psychotic symptoms, that were easily identified. She spoke haltingly and in brief verbal responses. Her thinking appeared concrete. Her affect was anxious and her mood matched her affect. She was also cooperative and admitted that she was feeling anxious about being in the hospital. She was encouraged to continue to introduce herself to her peers and, for today, find out who her attending psychiatrist (\"her doctor\") is.  She may need some concrete and reality focused tasks to help her with her acclimation to the unit and her role in her treatment. This was the patient's first process group with the undersinged.

## 2018-02-23 NOTE — DISCHARGE SUMMARY
PSYCHIATRIC DISCHARGE SUMMARY         IDENTIFICATION:    Patient Name  Yusuf La   Date of Birth 1950   [facility] [id number]   Medical Record Number  [id number]      Age  75 y.o. PCP Jodei Mo MD   Admit date:  2/20/2018    Discharge date: 2/23/2018   Room Number  727/01  @ [facility]   Date of Service  2/23/2018               TYPE OF DISCHARGE: REGULAR               CONDITION AT DISCHARGE: good       PROVISIONAL & DISCHARGE DIAGNOSES:    Problem List  Date Reviewed: 12/20/2017          Codes Class    Bilateral primary osteoarthritis of knee ICD-10-CM: M17.0  ICD-9-CM: 715.16         * (Principal)Dementia with behavioral disturbance ICD-10-CM: F03.91  ICD-9-CM: 294.21         Advanced care planning/counseling discussion ICD-10-CM: Z71.89  ICD-9-CM: V65.49     Overview Signed 11/8/2016 11:44 AM by Harvis Mcburney, RN     CM will look in her file for it. Osteoporosis ICD-10-CM: M81.0  ICD-9-CM: 733.00         Tremor ICD-10-CM: R25.1  ICD-9-CM: 272. 0         Abnormal involuntary movements ICD-10-CM: R25.9  ICD-9-CM: 781.0         Developmental delay ICD-10-CM: R62.50  ICD-9-CM: 783.40         Allergic rhinitis ICD-10-CM: J30.9  ICD-9-CM: 477.9         Hypercholesteremia ICD-10-CM: E78.00  ICD-9-CM: 272.0         Depression ICD-10-CM: F32.9  ICD-9-CM: 311         GERD (gastroesophageal reflux disease) ICD-10-CM: K21.9  ICD-9-CM: 530.81               Active Hospital Problems    *Dementia with behavioral disturbance      Developmental delay        DISCHARGE DIAGNOSIS:   Axis I:  SEE ABOVE  Axis II: SEE ABOVE  Axis III: SEE ABOVE  Axis IV:  lack of structure  Axis V:  60 on admission, 60 on discharge 60(baseline)       CC & HISTORY OF PRESENT ILLNESS:  75year old caucacasian female admitted for aggression in her group home. Pt was restrted on home meds and provided with limits and structure. She had no aggressive episodes during this admission.  At discharge to group home she denied SI/HI intent and plan and did not meet TDO criteria. SOCIAL HISTORY:    Social History     Social History    Marital status: SINGLE     Spouse name: N/A    Number of children: N/A    Years of education: N/A     Occupational History    Not on file. Social History Main Topics    Smoking status: Never Smoker    Smokeless tobacco: Never Used    Alcohol use No    Drug use: No    Sexual activity: No     Other Topics Concern    Not on file     Social History Narrative    75year old  female ad,itted from group home for peroidic agitation. Pt is ID and also has dementia. FAMILY HISTORY:   Family History   Problem Relation Age of Onset    Diabetes Father     Heart Disease Father              HOSPITALIZATION COURSE:    Ajit Ponce was admitted to the inpatient psychiatric unit [facility] for acute psychiatric stabilization in regards to symptomatology as described in the HPI above. The differential diagnosis at time of admission included: bipolar dis vs. schizoaffective vs. Schizophrenia. While on the unit Ajit Ponce was involved in individual, group, occupational and milieu therapy. Psychiatric medications were adjusted during this hospitalization including buspar. Ajit Ponce demonstrated a slow, but progressive improvement in overall condition. Much of patient's depression appeared to be related to situational stressors and psychological factors. Please see individual progress notes for more specific details regarding patient's hospitalization course. At time of dc, Ajit Ponce was without significant problems with depression psychosis  catherine. Overall presentation at time of discharge is most consistent with the diagnosis of bipolar disorder . Patient with request for discharge today. There are no grounds to seek a TDO. Patient has maximized benefit to be derived from acute inpatient psychiatric treatment.   All members of the treatment team concur with each other in regards to plans for discharge today per patient's request.          LABS AND IMAGAING:    Labs Reviewed   CBC W/O DIFF - Abnormal; Notable for the following:        Result Value    PLATELET 323 (*)     All other components within normal limits   LIPID PANEL - Abnormal; Notable for the following:     Cholesterol, total 219 (*)     LDL, calculated 140.2 (*)     All other components within normal limits   METABOLIC PANEL, COMPREHENSIVE - Abnormal; Notable for the following:     Glucose 120 (*)     BUN/Creatinine ratio 21 (*)     A-G Ratio 0.9 (*)     All other components within normal limits   TSH 3RD GENERATION   HEMOGLOBIN A1C WITH EAG   CK   GLUCOSE, FASTING     Admission on 02/20/2018   Component Date Value Ref Range Status    WBC 02/22/2018 6.3  3.6 - 11.0 K/uL Final    RBC 02/22/2018 4.18  3.80 - 5.20 M/uL Final    HGB 02/22/2018 12.6  11.5 - 16.0 g/dL Final    HCT 02/22/2018 37.2  35.0 - 47.0 % Final    MCV 02/22/2018 89.0  80.0 - 99.0 FL Final    MCH 02/22/2018 30.1  26.0 - 34.0 PG Final    MCHC 02/22/2018 33.9  30.0 - 36.5 g/dL Final    RDW 02/22/2018 13.5  11.5 - 14.5 % Final    PLATELET 14/95/9942 173* 150 - 400 K/uL Final    MPV 02/22/2018 12.2  8.9 - 12.9 FL Final    NRBC 02/22/2018 0.0  0  WBC Final    ABSOLUTE NRBC 02/22/2018 0.00  0.00 - 0.01 K/uL Final    LIPID PROFILE 02/22/2018        Final    Cholesterol, total 02/22/2018 219* <200 MG/DL Final    Triglyceride 02/22/2018 64  <150 MG/DL Final    HDL Cholesterol 02/22/2018 66  MG/DL Final    LDL, calculated 02/22/2018 140.2* 0 - 100 MG/DL Final    VLDL, calculated 02/22/2018 12.8  MG/DL Final    CHOL/HDL Ratio 02/22/2018 3.3  0 - 5.0   Final    Sodium 02/22/2018 137  136 - 145 mmol/L Final    Potassium 02/22/2018 3.7  3.5 - 5.1 mmol/L Final    Chloride 02/22/2018 105  97 - 108 mmol/L Final    CO2 02/22/2018 21  21 - 32 mmol/L Final    Anion gap 02/22/2018 11  5 - 15 mmol/L Final    Glucose 02/22/2018 120* 65 - 100 mg/dL Final    BUN 02/22/2018 16  6 - 20 MG/DL Final    Creatinine 02/22/2018 0.78  0.55 - 1.02 MG/DL Final    BUN/Creatinine ratio 02/22/2018 21* 12 - 20   Final    GFR est AA 02/22/2018 >60  >60 ml/min/1.73m2 Final    GFR est non-AA 02/22/2018 >60  >60 ml/min/1.73m2 Final    Calcium 02/22/2018 9.1  8.5 - 10.1 MG/DL Final    Bilirubin, total 02/22/2018 0.8  0.2 - 1.0 MG/DL Final    ALT (SGPT) 02/22/2018 20  12 - 78 U/L Final    AST (SGOT) 02/22/2018 18  15 - 37 U/L Final    Alk. phosphatase 02/22/2018 76  45 - 117 U/L Final    Protein, total 02/22/2018 7.5  6.4 - 8.2 g/dL Final    Albumin 02/22/2018 3.5  3.5 - 5.0 g/dL Final    Globulin 02/22/2018 4.0  2.0 - 4.0 g/dL Final    A-G Ratio 02/22/2018 0.9* 1.1 - 2.2   Final    TSH 02/22/2018 0.73  0.36 - 3.74 uIU/mL Final    Hemoglobin A1c 02/22/2018 5.4  4.2 - 6.3 % Final    Est. average glucose 02/22/2018 108  mg/dL Final    CK 02/22/2018 158  26 - 192 U/L Final    Glucose 02/23/2018 98  65 - 100 MG/DL Final     Xr Spine Thorac 3 V    Result Date: 2/21/2018  EXAM:  XR SPINE THORAC 3 V INDICATION: Back pain after fall on 2/21/2018. COMPARISON: None. FINDINGS: AP, lateral and swimmers lateral views of the thoracic spine demonstrate kyphosis with degenerative change. No fracture or subluxation is identified. IMPRESSION: Thoracic kyphosis and spondylosis. No fracture or acute abnormality. Ct Head Wo Cont    Result Date: 2/22/2018  EXAM:  CT HEAD WITHOUT CONTRAST INDICATION: Fall. COMPARISON: 10/13/2015. CONTRAST: None. TECHNIQUE: Unenhanced CT of the head was performed using 5 mm images. Brain and bone windows were generated. Sagittal and coronal reformations were generated. CT dose reduction was achieved through use of a standardized protocol tailored for this examination and automatic exposure control for dose modulation.  CT dose reduction was achieved through use of a standardized protocol tailored for this examination and automatic exposure control for dose modulation. Adaptive statistical iterative reconstruction (ASIR) was utilized for this examination. FINDINGS: The ventricles and sulci are normal in size, shape and configuration and midline. There is no significant white matter disease. There is no intracranial hemorrhage. There is no extra-axial collection, mass, mass effect or midline shift. The basilar cisterns are open. No acute infarct is identified. The bone windows demonstrate no abnormalities. The visualized portions of the paranasal sinuses and mastoid air cells are clear. IMPRESSION: Normal unenhanced CT examination of the head. Xr Skull < 4 V(ltd)    Result Date: 2/21/2018  EXAM:  XR SKULL < 4 V(LTD) INDICATION:   Fall on 2/21/2018. COMPARISON:  None. Marc Nelson FINDINGS:  AP and lateral views of the skull show no fracture or other significant bony abnormality. The sella turcica is not enlarged. IMPRESSION: Normal skull. Plain skull radiography is not a sensitive examination for evaluation of head trauma. DISPOSITION:    Home. Patient to f/u with o/p psychiatric, and psychotherapy appointments. Patient is to f/u with internist as directed. FOLLOW-UP CARE:    Activity as tolerated  Regular Diet  Wound Care: none needed. Follow-up Information     Follow up With Details Comments Contact Info    Dr. Margaret Black [address]  [address] [place], [place]   [address] [place] [address]   [address] [place] [address]  [phone]                   PROGNOSIS:  Greatly dependent upon patient's ability to f/u with o/p psychiatric/psychotherapy appointments as well as to comply with psychiatric medications as prescribed. Patient denies suicidal or homicidal ideations. Kandice Dk fully contracts for safety. Patient reports many positive predictive factors in terms of not attempting suicide or homicide.  Patient appears to be at low risk of suicide or homicide. Patient and family are aware and in agreement with discharge and discharge plan. DISCHARGE MEDICATIONS: (no changes made). Informed consent given for the use of following psychotropic medications:  Current Discharge Medication List      START taking these medications    Details   hydrOXYzine HCl (ATARAX) 25 mg tablet Take 1 Tab by mouth three (3) times daily for 10 days. Indications: anxiety  Qty: 90 Tab, Refills: 0         CONTINUE these medications which have CHANGED    Details   QUEtiapine (SEROQUEL) 50 mg tablet Take 1 Tab by mouth three (3) times daily. Indications: BIPOLAR DISORDER IN REMISSION  Qty: 90 Tab, Refills: 0      FLUoxetine (PROZAC) 20 mg capsule Take 3 Caps by mouth daily. Indications: ANXIETY WITH DEPRESSION  Qty: 90 Cap, Refills: 0      busPIRone (BUSPAR) 10 mg tablet Take 2 Tabs by mouth three (3) times daily. Indications: Generalized Anxiety Disorder  Qty: 90 Tab, Refills: 0         CONTINUE these medications which have NOT CHANGED    Details   alendronate (FOSAMAX) 70 mg tablet TAKE ONE TABLET WEEKLY ON FRIDAY 30 MINUTES BEFORE 1ST MEAL WITH FULLGLASS OF WATER. DO NOT LIE DOWN FOR 30 MINUTES AFTER TAKING  Qty: 4 Tab, Refills: 5      cyanocobalamin (VITAMIN B-12) 1,000 mcg tablet TAKE 1 TABLET BY MOUTH DAILY  Qty: 30 Tab, Refills: PRN      calcium carbonate (CALTREX) 600 mg calcium (1,500 mg) tablet TAKE 1 TABLET BY MOUTH TWICE A DAY  Qty: 62 Tab, Refills: PRN      loratadine (CLARITIN) 10 mg tablet TAKE 1 TABLET BY MOUTH DAILY FOR ALLERGIES  Qty: 31 Tab, Refills: PRN      inulin (FIBER CHOICE, INULIN,) 1.5 gram chew Take 1 Tab by mouth two (2) times a day.   Qty: 60 Tab, Refills: 5      polyethylene glycol (MIRALAX) 17 gram/dose powder MIX 1 CAPFUL (=17GM) IN A GLASS OF WATER & DRINK ONCE DAILY AS NEEDEDFOR CONSTIPATION  Qty: 527 g, Refills: 0         STOP taking these medications       DOC-Q-LACE 100 mg capsule Comments:   Reason for Stopping:         esomeprazole (NEXIUM) 40 mg capsule Comments:   Reason for Stopping:         ergocalciferol (VITAMIN D2) 50,000 unit capsule Comments:   Reason for Stopping:         ALPRAZolam (XANAX) 0.25 mg tablet Comments:   Reason for Stopping:         Fiber (FIBER CHOICE) Chew Comments:   Reason for Stopping:                      A coordinated, multidisplinary treatment team round was conducted with Hannah Novoa is done daily here at [facility] . This team consists of the nurse, psychiatric unit pharmcist,  and writer. I have spent greater than 35 minutes on discharge work.     Signed:  Saqib Gallego MD  2/23/2018

## 2018-02-24 PROBLEM — F03.918: Status: ACTIVE | Noted: 2018-02-24

## 2018-02-24 PROCEDURE — 74011250637 HC RX REV CODE- 250/637: Performed by: PSYCHIATRY & NEUROLOGY

## 2018-02-24 PROCEDURE — 65220000003 HC RM SEMIPRIVATE PSYCH

## 2018-02-24 RX ADMIN — FLUOXETINE HYDROCHLORIDE 60 MG: 20 CAPSULE ORAL at 10:30

## 2018-02-24 RX ADMIN — HYDROXYZINE HYDROCHLORIDE 25 MG: 25 TABLET ORAL at 10:30

## 2018-02-24 RX ADMIN — HYDROXYZINE HYDROCHLORIDE 25 MG: 25 TABLET ORAL at 16:31

## 2018-02-24 RX ADMIN — Medication 100 MG: at 10:30

## 2018-02-24 RX ADMIN — QUETIAPINE 50 MG: 25 TABLET ORAL at 21:30

## 2018-02-24 RX ADMIN — BUSPIRONE HYDROCHLORIDE 20 MG: 10 TABLET ORAL at 16:31

## 2018-02-24 RX ADMIN — HYDROXYZINE HYDROCHLORIDE 25 MG: 25 TABLET ORAL at 21:30

## 2018-02-24 RX ADMIN — BUSPIRONE HYDROCHLORIDE 20 MG: 10 TABLET ORAL at 10:30

## 2018-02-24 RX ADMIN — BUSPIRONE HYDROCHLORIDE 20 MG: 10 TABLET ORAL at 21:30

## 2018-02-24 RX ADMIN — QUETIAPINE 50 MG: 25 TABLET ORAL at 10:30

## 2018-02-24 RX ADMIN — QUETIAPINE 50 MG: 25 TABLET ORAL at 16:31

## 2018-02-24 RX ADMIN — FOLIC ACID 1 MG: 1 TABLET ORAL at 10:30

## 2018-02-24 NOTE — INTERDISCIPLINARY ROUNDS
Behavioral Health Interdisciplinary Rounds     Patient Name: Shobha King  Age: 75 y.o. Room/Bed:  727/  Primary Diagnosis: Dementia with behavioral disturbance   Admission Status: Voluntary     Readmission within 30 days: no  Power of  in place:  Patient requires a blocked bed: no          Reason for blocked bed: n/a    VTE Prophylaxis: No  Flu vaccine given : no   Mobility needs/Fall risk: no    Nutritional Plan: no  Consults:          Labs/Testing due today?: no    Sleep hours: 6.75       Participation in Care/Groups:  yes  Medication Compliant?: Yes  PRNS (last 24 hours): None    Restraints (last 24 hours):  no  Substance Abuse:  no  CIWA (range last 24 hours): 0-1 COWS (range last 24 hours):   Alcohol screening (AUDIT) completed -  AUDIT Score: 0  If applicable, date SBIRT discussed in treatment team AND documented:   Tobacco - patient is a smoker: no   Date tobacco education completed by RN: n/a  24 hour chart check complete: yes     Patient goal(s) for today: Ready to go back to the home  Treatment team focus/goals: Prepare pt for Monday's d/c   Progress note : Good mood and promised not to hit staff at group home    LOS:  4  Expected LOS:     Financial concerns/prescription coverage:    Date of last family contact:       Family requesting physician contact today:    Discharge plan: R-Group Home  Guns in the home:  N/A      Outpatient provider(s):     Participating treatment team members: Dr Addie Bear.  [facility] and [facility] [place]

## 2018-02-24 NOTE — PROGRESS NOTES
Problem: Depressed Mood (Adult/Pediatric)  Goal: *STG: Participates in treatment plan  Outcome: Progressing Towards Goal  Pt is out on the unit watching TV  Smiling cooperative and appropriate with staff and peers   Antonio Reyes she is ready for discharge on Monday. Med/Meal compliant   Will continue to monitor.    Attended AA group

## 2018-02-24 NOTE — PROGRESS NOTES
Problem: Depressed Mood (Adult/Pediatric)  Goal: *STG: Participates in treatment plan  Outcome: Progressing Towards Goal  Medication compliant. Out on unit social w peers. Engaged in milieu. Demonstrates appropriate behaviors and ability to follow direction and unit routine. Daily goal is to watch tv shows.  Staff focus is on offering reassurance and support

## 2018-02-24 NOTE — BH NOTES
PSYCHIATRIC PROGRESS NOTE         Patient Name  Dorothy Burroughs   Date of Birth 1950   [facility] [id number]   Medical Record Number  [id number]      Age  75 y.o. PCP Adrienne Clark MD   Admit date:  2/20/2018    Room Number  727/01  @ [facility]   Date of Service  2/24/2018          PSYCHOTHERAPY SESSION NOTE:  Length of psychotherapy session: 30 minutes    Main condition/diagnosis/issues treated during session today, 2/24/2018 : behavioral control, anger management, treatment compliance    I employed Cognitive Behavioral therapy techniques, Reality-Oriented psychotherapy, as well as supportive psychotherapy in regards to various ongoing psychosocial stressors, including the following: pre-admission and current problems; housing issues;  stress of hospitalization. Interpersonal relationship issues and psychodynamic conflicts explored. Attempts made to alleviate maladaptive patterns. We, also, worked on issues of denial & effects of substance dependency/use     Overall, patient is not progressing    Treatment Plan Update (reviewed an updated 2/24/2018) : I will modify psychotherapy tx plan by implementing more stress management strategies, building upon cognitive behavioral techniques, increasing coping skills, as well as shoring up psychological defenses). An extended energy and skill set was needed to engage pt in psychotherapy due to some of the following: resistiveness, complexity, negativity, confrontational nature, hostile behaviors, and/or severe abnormalities in thought processes/psychosis resulting in the loss of expressive/receptive language communication skills. E & M PROGRESS NOTE:         HISTORY       CC:  \"behavioral problems\"  HISTORY OF PRESENT ILLNESS/INTERVAL HISTORY:  (reviewed/updated 2/24/2018). per initial evaluation:     Dorothy Burroughs presents/reports/evidences the following emotional symptoms today, 2/24/2018:agitation.  The above symptoms have been present for years . These symptoms are of acute severity. The symptoms are intermittent/ fleeting in nature. Additional symptomatology and features include anxiety. 2/23/18- Cheerful and pleasant. Absolutely no aggressive behavior. Is quite redirectable and appropriate with staff and peers. -  2/24/18- Cheerful, pleasant, cooperative. Able to voice strategies to avoif behavioral outbursts. SIDE EFFECTS: (reviewed/updated 2/24/2018)  None reported or admitted to. No noted toxicity with use of Depakote/Tegretol/lithium/Clozaril/TCAs   ALLERGIES:(reviewed/updated 2/24/2018)  No Known Allergies   MEDICATIONS PRIOR TO ADMISSION:(reviewed/updated 2/24/2018)  Prescriptions Prior to Admission   Medication Sig    alendronate (FOSAMAX) 70 mg tablet TAKE ONE TABLET WEEKLY ON FRIDAY 30 MINUTES BEFORE 1ST MEAL WITH FULLGLASS OF WATER. DO NOT LIE DOWN FOR 30 MINUTES AFTER TAKING    DOC-Q-LACE 100 mg capsule TAKE (1) CAPSULE BY MOUTH DAILY    cyanocobalamin (VITAMIN B-12) 1,000 mcg tablet TAKE 1 TABLET BY MOUTH DAILY    calcium carbonate (CALTREX) 600 mg calcium (1,500 mg) tablet TAKE 1 TABLET BY MOUTH TWICE A DAY    loratadine (CLARITIN) 10 mg tablet TAKE 1 TABLET BY MOUTH DAILY FOR ALLERGIES    esomeprazole (NEXIUM) 40 mg capsule TAKE (1) CAPSULE BY MOUTH DAILY    ergocalciferol (VITAMIN D2) 50,000 unit capsule TAKE 1 CAPSULE ONCE A WEEK ON FRIDAY.  inulin (FIBER CHOICE, INULIN,) 1.5 gram chew Take 1 Tab by mouth two (2) times a day.  FLUOXETINE HCL (FLUOXETINE PO) Take 60 mg by mouth.  busPIRone (BUSPAR) 15 mg tablet Take 15 mg by mouth three (3) times daily.  polyethylene glycol (MIRALAX) 17 gram/dose powder MIX 1 CAPFUL (=17GM) IN A GLASS OF WATER & DRINK ONCE DAILY AS NEEDEDFOR CONSTIPATION    ALPRAZolam (XANAX) 0.25 mg tablet Take 0.5 mg by mouth three (3) times daily (with meals).  Fiber (FIBER CHOICE) Chew Take 1 Tab by mouth two (2) times a day.     QUEtiapine (SEROQUEL) 25 mg tablet Take 50 mg by mouth two (2) times a day. PAST MEDICAL HISTORY: Past medical history from the initial psychiatric evaluation has been reviewed (reviewed/updated 2/24/2018) with no additional updates (I asked patient and no additional past medical history provided). Past Medical History:   Diagnosis Date    Allergic rhinitis, cause unspecified 7/15/2010    Anxiety     Decreased bone density 07/24/09    Dexa Scan     Detached retina     Diverticulosis     Falls     GERD (gastroesophageal reflux disease)     HTN (hypertension)     Memory disorder     Menorrhagia     Mental retardation     Muscle weakness     Osteopenia     Other and unspecified hyperlipidemia     S/P colonoscopy 03/01/08    Shortness of breath     Snoring     Thrombocytopenia (HCC)      Past Surgical History:   Procedure Laterality Date    COLONOSCOPY  3-5-2008    HX CATARACT REMOVAL      HX OVARIAN CYST REMOVAL      HX POLYPECTOMY      ablation      SOCIAL HISTORY: Social history from the initial psychiatric evaluation has been reviewed (reviewed/updated 2/24/2018) with no additional updates (I asked patient and no additional social history provided). Social History     Social History    Marital status: SINGLE     Spouse name: N/A    Number of children: N/A    Years of education: N/A     Occupational History    Not on file. Social History Main Topics    Smoking status: Never Smoker    Smokeless tobacco: Never Used    Alcohol use No    Drug use: No    Sexual activity: No     Other Topics Concern    Not on file     Social History Narrative    75year old  female ad,itted from group home for peroidic agitation. Pt is ID and also has dementia. FAMILY HISTORY: Family history from the initial psychiatric evaluation has been reviewed (reviewed/updated 2/24/2018) with no additional updates (I asked patient and no additional family history provided).    Family History   Problem Relation Age of Onset    Diabetes Father     Heart Disease Father        REVIEW OF SYSTEMS: (reviewed/updated 2/24/2018)  Appetite:no change from normal   Sleep: no change   All other Review of Systems: Psychological ROS: positive for - behavioral disorder  Respiratory ROS: no cough, shortness of breath, or wheezing  Cardiovascular ROS: no chest pain or dyspnea on exertion         [facility] (MSE):    MSE FINDINGS ARE WITHIN NORMAL LIMITS (WNL) UNLESS OTHERWISE STATED BELOW. ( ALL OF THE BELOW CATEGORIES OF THE MSE HAVE BEEN REVIEWED (reviewed 2/24/2018) AND UPDATED AS DEEMED APPROPRIATE )  General Presentation age appropriate, cooperative   Orientation Alert and Oriented x 1   Vital Signs  See below (reviewed 2/24/2018); Vital Signs (BP, Pulse, & Temp) are within normal limits if not listed below.    Gait and Station Stable/steady, no ataxia   Musculoskeletal System No extrapyramidal symptoms (EPS); no abnormal muscular movements or Tardive Dyskinesia (TD); muscle strength and tone are within normal limits   Language No aphasia or dysarthria   Speech:  hyperverbal   Thought Processes Primitive  slow rate of thoughts; poor abstract reasoning/computation   Thought Associations circumstantial   Thought Content not internally preoccupied   Suicidal Ideations none   Homicidal Ideations none   Mood:  stable    Affect:  stable   Memory recent  impaired   Memory remote:  impaired   Concentration/Attention:  distractable   Fund of Knowledge significantly below average   Insight:  poor   Reliability poor   Judgment:  poor          VITALS:     Patient Vitals for the past 24 hrs:   Temp Pulse Resp BP SpO2   02/24/18 1257 98.3 °F (36.8 °C) 89 18 133/81 93 %   02/24/18 1017 98 °F (36.7 °C) 79 16 121/76 98 %   02/23/18 2100 - 77 - 138/81 -   02/23/18 2047 98.6 °F (37 °C) 71 16 95/65 97 %   02/23/18 1543 97.7 °F (36.5 °C) 73 16 136/83 97 %     Wt Readings from Last 3 Encounters:   02/20/18 73.9 kg (163 lb)   12/20/17 79.4 kg (175 lb)   10/30/17 71 kg (156 lb 8.4 oz)     Temp Readings from Last 3 Encounters:   02/24/18 98.3 °F (36.8 °C)   12/20/17 97.5 °F (36.4 °C) (Oral)   08/08/17 98.1 °F (36.7 °C) (Oral)     BP Readings from Last 3 Encounters:   02/24/18 133/81   12/20/17 110/64   10/30/17 110/64     Pulse Readings from Last 3 Encounters:   02/24/18 89   12/20/17 60   10/30/17 (!) 57            DATA     LABORATORY DATA:(reviewed/updated 2/24/2018)  No results found for this or any previous visit (from the past 24 hour(s)). No results found for: VALF2, VALAC, VALP, VALPR, DS6, CRBAM, CRBAMP, CARB2, XCRBAM  No results found for: LITHM   RADIOLOGY REPORTS:(reviewed/updated 2/24/2018)  No results found.        MEDICATIONS     ALL MEDICATIONS:   Current Facility-Administered Medications   Medication Dose Route Frequency    folic acid (FOLVITE) tablet 1 mg  1 mg Oral DAILY    thiamine (B-1) tablet 100 mg  100 mg Oral DAILY    LORazepam (ATIVAN) tablet 2 mg  2 mg Oral Q1H PRN    LORazepam (ATIVAN) tablet 4 mg  4 mg Oral Q1H PRN    hydrOXYzine HCl (ATARAX) tablet 25 mg  25 mg Oral TID    LORazepam (ATIVAN) tablet 0.5 mg  0.5 mg Oral BID PRN    FLUoxetine (PROzac) capsule 60 mg  60 mg Oral DAILY    QUEtiapine (SEROquel) tablet 50 mg  50 mg Oral TID    busPIRone (BUSPAR) tablet 20 mg  20 mg Oral TID    ziprasidone (GEODON) 20 mg in sterile water (preservative free) 1 mL injection  20 mg IntraMUSCular BID PRN    OLANZapine (ZyPREXA) tablet 5 mg  5 mg Oral Q6H PRN    benztropine (COGENTIN) tablet 2 mg  2 mg Oral BID PRN    benztropine (COGENTIN) injection 2 mg  2 mg IntraMUSCular BID PRN    LORazepam (ATIVAN) injection 2 mg  2 mg IntraMUSCular Q4H PRN    acetaminophen (TYLENOL) tablet 650 mg  650 mg Oral Q4H PRN    ibuprofen (MOTRIN) tablet 400 mg  400 mg Oral Q8H PRN    magnesium hydroxide (MILK OF MAGNESIA) 400 mg/5 mL oral suspension 30 mL  30 mL Oral DAILY PRN    zolpidem (AMBIEN) tablet 5 mg  5 mg Oral QHS PRN      SCHEDULED MEDICATIONS:   Current Facility-Administered Medications   Medication Dose Route Frequency    folic acid (FOLVITE) tablet 1 mg  1 mg Oral DAILY    thiamine (B-1) tablet 100 mg  100 mg Oral DAILY    hydrOXYzine HCl (ATARAX) tablet 25 mg  25 mg Oral TID    FLUoxetine (PROzac) capsule 60 mg  60 mg Oral DAILY    QUEtiapine (SEROquel) tablet 50 mg  50 mg Oral TID    busPIRone (BUSPAR) tablet 20 mg  20 mg Oral TID          ASSESSMENT & PLAN     DIAGNOSES REQUIRING ACTIVE TREATMENT AND MONITORING: (reviewed/updated 2/24/2018)  Patient Active Hospital Problem List:   Dementia with behavioral disturbance (7/13/2017)    Assessment: cognitive, memory, verbal decline, ischemic vascular disease    Plan: namenda   Developmental delay (11/23/2015)    Assessment: intellectual functioning far behind chronological age    Plan: supportive psychotherapy            I will continue to monitor blood levels (Depakote, Tegretol, lithium, clozapine---a drug with a narrow therapeutic index= NTI) and associated labs for drug therapy implemented that require intense monitoring for toxicity as deemed appropriate based on current medication side effects and pharmacodynamically determined drug 1/2 lives. In summary, Francisca Harmon, is a 75 y.o.  female who presents with a severe exacerbation of the principal diagnosis of Dementia with behavioral disturbance  Patient's condition is worsening/not improving/not stable . Patient requires continued inpatient hospitalization for further stabilization, safety monitoring and medication management. I will continue to coordinate the provision of individual, milieu, occupational, group, and substance abuse therapies to address target symptoms/diagnoses as deemed appropriate for the individual patient.   A coordinated, multidisplinary treatment team round was conducted with the patient (this team consists of the nurse, psychiatric unit pharmcist,  and writer). Complete current electronic health record for patient has been reviewed today including consultant notes, ancillary staff notes, nurses and psychiatric tech notes. Suicide risk assessment completed and patient deemed to be of low risk for suicide at this time. The following regarding medications was addressed during rounds with patient:   the risks and benefits of the proposed medication. The patient was given the opportunity to ask questions. Informed consent given to the use of the above medications. Will continue to adjust psychiatric and non-psychiatric medications (see above \"medication\" section and orders section for details) as deemed appropriate & based upon diagnoses and response to treatment. I will continue to order blood tests/labs and diagnostic tests as deemed appropriate and review results as they become available (see orders for details and above listed lab/test results). I will order psychiatric records from previous [facility] hospitals to further elucidate the nature of patient's psychopathology and review once available. I will gather additional collateral information from friends, family and o/p treatment team to further elucidate the nature of patient's psychopathology and baselline level of psychiatric functioning. I certify that this patient's inpatient psychiatric hospital services furnished since the previous certification were, and continue to be, required for treatment that could reasonably be expected to improve the patient's condition, or for diagnostic study, and that the patient continues to need, on a daily basis, active treatment furnished directly by or requiring the supervision of inpatient psychiatric facility personnel. In addition, the hospital records show that services furnished were intensive treatment services, admission or related services, or equivalent services.     EXPECTED DISCHARGE DATE/DAY: TBD     DISPOSITION: Home       Signed By: Duane Johnson MD  2/24/2018

## 2018-02-24 NOTE — PROGRESS NOTES
Problem: Falls - Risk of  Goal: *Absence of Falls  Document Eli Fall Risk and appropriate interventions in the flowsheet. Outcome: Progressing Towards Goal  Fall Risk Interventions:   Pt in bed asleep. NAD. No falls noted/reported. Q 15 minute checks for safety maintained. Mentation Interventions: Adequate sleep, hydration, pain control, Reorient patient    Medication Interventions: Teach patient to arise slowly    Elimination Interventions:  Toilet paper/wipes in reach    History of Falls Interventions: Room close to nurse's station

## 2018-02-25 PROCEDURE — 74011250637 HC RX REV CODE- 250/637: Performed by: PSYCHIATRY & NEUROLOGY

## 2018-02-25 PROCEDURE — 65220000003 HC RM SEMIPRIVATE PSYCH

## 2018-02-25 RX ADMIN — QUETIAPINE 50 MG: 25 TABLET ORAL at 17:22

## 2018-02-25 RX ADMIN — BUSPIRONE HYDROCHLORIDE 20 MG: 10 TABLET ORAL at 21:01

## 2018-02-25 RX ADMIN — BUSPIRONE HYDROCHLORIDE 20 MG: 10 TABLET ORAL at 17:22

## 2018-02-25 RX ADMIN — Medication 100 MG: at 09:07

## 2018-02-25 RX ADMIN — HYDROXYZINE HYDROCHLORIDE 25 MG: 25 TABLET ORAL at 09:07

## 2018-02-25 RX ADMIN — HYDROXYZINE HYDROCHLORIDE 25 MG: 25 TABLET ORAL at 17:22

## 2018-02-25 RX ADMIN — QUETIAPINE 50 MG: 25 TABLET ORAL at 21:01

## 2018-02-25 RX ADMIN — FLUOXETINE HYDROCHLORIDE 60 MG: 20 CAPSULE ORAL at 09:08

## 2018-02-25 RX ADMIN — QUETIAPINE 50 MG: 25 TABLET ORAL at 09:07

## 2018-02-25 RX ADMIN — HYDROXYZINE HYDROCHLORIDE 25 MG: 25 TABLET ORAL at 21:01

## 2018-02-25 RX ADMIN — BUSPIRONE HYDROCHLORIDE 20 MG: 10 TABLET ORAL at 09:07

## 2018-02-25 RX ADMIN — FOLIC ACID 1 MG: 1 TABLET ORAL at 09:07

## 2018-02-25 NOTE — PROGRESS NOTES
Problem: Depressed Mood (Adult/Pediatric)  Goal: *STG: Participates in treatment plan  Outcome: Progressing Towards Goal  Pt participated in treatment team. Visible on the unit and interacting with peers and staff. Goal: *STG: Verbalizes anger, guilt, and other feelings in a constructive manor  Outcome: Progressing Towards Goal  Pt able to verbalize feelings in a constructive manor. Goal: *STG: Attends activities and groups  Outcome: Progressing Towards Goal  Encouraged pt to attend groups and express feelings.

## 2018-02-25 NOTE — INTERDISCIPLINARY ROUNDS
Behavioral Health Interdisciplinary Rounds     Patient Name: Je Avendano  Age: 75 y.o.   Room/Bed:  [facility]/  Primary Diagnosis: Dementia with behavioral disturbance   Admission Status: Voluntary     Readmission within 30 days: no  Power of  in place:   Patient requires a blocked bed: no          Reason for blocked bed: n/a    VTE Prophylaxis: No  Flu vaccine given : no   Mobility needs/Fall risk: no    Nutritional Plan: no  Consults:          Labs/Testing due today?: no    Sleep hours: 7.25       Participation in Care/Groups:  yes  Medication Compliant?: Yes  PRNS (last 24 hours): None    Restraints (last 24 hours):  no  Substance Abuse:  no  CIWA (range last 24 hours): 0-0 COWS (range last 24 hours):   Alcohol screening (AUDIT) completed -  AUDIT Score: 0  If applicable, date SBIRT discussed in treatment team AND documented:   Tobacco - patient is a smoker: no   Date tobacco education completed by RN: n/a  24 hour chart check complete: yes     Patient goal(s) for today:   Treatment team focus/goals:   Progress note     LOS:  5  Expected LOS:     Financial concerns/prescription coverage:    Date of last family contact:       Family requesting physician contact today:    Discharge plan:   Guns in the home:         Outpatient provider(s):     Participating treatment team members: Je Avendano, * (assigned SW),

## 2018-02-25 NOTE — PROGRESS NOTES
Problem: Falls - Risk of  Goal: *Absence of Falls  Document Eli Fall Risk and appropriate interventions in the flowsheet. Outcome: Progressing Towards Goal  Fall Risk Interventions:       Mentation Interventions: Adequate sleep, hydration, pain control    Medication Interventions: Teach patient to arise slowly    Elimination Interventions:  Toilet paper/wipes in reach    History of Falls Interventions: Door open when patient unattended, Room close to nurse's station

## 2018-02-26 ENCOUNTER — TELEPHONE (OUTPATIENT)
Dept: INTERNAL MEDICINE CLINIC | Age: 68
End: 2018-02-26

## 2018-02-26 VITALS
WEIGHT: 165 LBS | TEMPERATURE: 97.6 F | RESPIRATION RATE: 16 BRPM | DIASTOLIC BLOOD PRESSURE: 76 MMHG | OXYGEN SATURATION: 96 % | HEIGHT: 59 IN | HEART RATE: 66 BPM | SYSTOLIC BLOOD PRESSURE: 117 MMHG | BODY MASS INDEX: 33.26 KG/M2

## 2018-02-26 PROCEDURE — 74011250637 HC RX REV CODE- 250/637: Performed by: PSYCHIATRY & NEUROLOGY

## 2018-02-26 RX ADMIN — QUETIAPINE 50 MG: 25 TABLET ORAL at 08:45

## 2018-02-26 RX ADMIN — HYDROXYZINE HYDROCHLORIDE 25 MG: 25 TABLET ORAL at 08:45

## 2018-02-26 RX ADMIN — BUSPIRONE HYDROCHLORIDE 20 MG: 10 TABLET ORAL at 08:45

## 2018-02-26 RX ADMIN — FOLIC ACID 1 MG: 1 TABLET ORAL at 08:45

## 2018-02-26 RX ADMIN — FLUOXETINE HYDROCHLORIDE 60 MG: 20 CAPSULE ORAL at 08:45

## 2018-02-26 RX ADMIN — Medication 100 MG: at 08:45

## 2018-02-26 NOTE — DISCHARGE SUMMARY
PSYCHIATRIC DISCHARGE SUMMARY         IDENTIFICATION:    Patient Name  Ajit Ponce   Date of Birth 1950   [facility] [id number]   Medical Record Number  [id number]      Age  75 y.o. 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 Osteoporosis ICD-10-CM: M81.0  ICD-9-CM: 733.00         Tremor ICD-10-CM: R25.1  ICD-9-CM: 360. 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 Pt was admitted for aggressive behavior in [facility] home. She was re started on home meds. There were no further episodes of aggression. At discharge she denied SI/HI intent and plan. She denied AH/VH and did not meet TDO criteria at discharge. SOCIAL HISTORY:    Social History     Social History    Marital status: SINGLE     Spouse name: N/A    Number of children: N/A    Years of education: N/A     Occupational History    Not on file. Social History Main Topics    Smoking status: Never Smoker    Smokeless tobacco: Never Used    Alcohol use No    Drug use: No    Sexual activity: No     Other Topics Concern    Not on file     Social History Narrative    75year old  female ad,itted from group home for peroidic agitation. Pt is ID and also has dementia. FAMILY HISTORY:   Family History   Problem Relation Age of Onset    Diabetes Father     Heart Disease Father              HOSPITALIZATION COURSE:    Dev Zafar was admitted to the inpatient psychiatric unit [facility] for acute psychiatric stabilization in regards to symptomatology as described in the HPI above. The differential diagnosis at time of admission included: bipolar dis vs. schizoaffective vs. Schizophrenia. While on the unit Dev Zafar was involved in individual, group, occupational and milieu therapy. Psychiatric medications were adjusted during this hospitalization including ambien. Dev Zafar demonstrated a slow, but progressive improvement in overall condition. Much of patient's depression appeared to be related to situational stressors and psychological factors. Please see individual progress notes for more specific details regarding patient's hospitalization course. At time of dc, Dev Zafar was without significant problems with depression psychosis  catherine.   Overall presentation at time of discharge is most consistent with the diagnosis of bipolar disorder . Patient with request for discharge today. There are no grounds to seek a TDO. Patient has maximized benefit to be derived from acute inpatient psychiatric treatment.   All members of the treatment team concur with each other in regards to plans for discharge today per patient's request.          LABS AND IMAGAING:    Labs Reviewed   CBC W/O DIFF - Abnormal; Notable for the following:        Result Value    PLATELET 316 (*)     All other components within normal limits   LIPID PANEL - Abnormal; Notable for the following:     Cholesterol, total 219 (*)     LDL, calculated 140.2 (*)     All other components within normal limits   METABOLIC PANEL, COMPREHENSIVE - Abnormal; Notable for the following:     Glucose 120 (*)     BUN/Creatinine ratio 21 (*)     A-G Ratio 0.9 (*)     All other components within normal limits   TSH 3RD GENERATION   HEMOGLOBIN A1C WITH EAG   CK   GLUCOSE, FASTING     Admission on 02/20/2018   Component Date Value Ref Range Status    WBC 02/22/2018 6.3  3.6 - 11.0 K/uL Final    RBC 02/22/2018 4.18  3.80 - 5.20 M/uL Final    HGB 02/22/2018 12.6  11.5 - 16.0 g/dL Final    HCT 02/22/2018 37.2  35.0 - 47.0 % Final    MCV 02/22/2018 89.0  80.0 - 99.0 FL Final    MCH 02/22/2018 30.1  26.0 - 34.0 PG Final    MCHC 02/22/2018 33.9  30.0 - 36.5 g/dL Final    RDW 02/22/2018 13.5  11.5 - 14.5 % Final    PLATELET 24/93/1997 369* 150 - 400 K/uL Final    MPV 02/22/2018 12.2  8.9 - 12.9 FL Final    NRBC 02/22/2018 0.0  0  WBC Final    ABSOLUTE NRBC 02/22/2018 0.00  0.00 - 0.01 K/uL Final    LIPID PROFILE 02/22/2018        Final    Cholesterol, total 02/22/2018 219* <200 MG/DL Final    Triglyceride 02/22/2018 64  <150 MG/DL Final    HDL Cholesterol 02/22/2018 66  MG/DL Final    LDL, calculated 02/22/2018 140.2* 0 - 100 MG/DL Final    VLDL, calculated 02/22/2018 12.8  MG/DL Final    CHOL/HDL Ratio 02/22/2018 3.3  0 - 5.0   Final    Sodium 02/22/2018 137  136 - 145 mmol/L Final  Potassium 02/22/2018 3.7  3.5 - 5.1 mmol/L Final    Chloride 02/22/2018 105  97 - 108 mmol/L Final    CO2 02/22/2018 21  21 - 32 mmol/L Final    Anion gap 02/22/2018 11  5 - 15 mmol/L Final    Glucose 02/22/2018 120* 65 - 100 mg/dL Final    BUN 02/22/2018 16  6 - 20 MG/DL Final    Creatinine 02/22/2018 0.78  0.55 - 1.02 MG/DL Final    BUN/Creatinine ratio 02/22/2018 21* 12 - 20   Final    GFR est AA 02/22/2018 >60  >60 ml/min/1.73m2 Final    GFR est non-AA 02/22/2018 >60  >60 ml/min/1.73m2 Final    Calcium 02/22/2018 9.1  8.5 - 10.1 MG/DL Final    Bilirubin, total 02/22/2018 0.8  0.2 - 1.0 MG/DL Final    ALT (SGPT) 02/22/2018 20  12 - 78 U/L Final    AST (SGOT) 02/22/2018 18  15 - 37 U/L Final    Alk. phosphatase 02/22/2018 76  45 - 117 U/L Final    Protein, total 02/22/2018 7.5  6.4 - 8.2 g/dL Final    Albumin 02/22/2018 3.5  3.5 - 5.0 g/dL Final    Globulin 02/22/2018 4.0  2.0 - 4.0 g/dL Final    A-G Ratio 02/22/2018 0.9* 1.1 - 2.2   Final    TSH 02/22/2018 0.73  0.36 - 3.74 uIU/mL Final    Hemoglobin A1c 02/22/2018 5.4  4.2 - 6.3 % Final    Est. average glucose 02/22/2018 108  mg/dL Final    CK 02/22/2018 158  26 - 192 U/L Final    Glucose 02/23/2018 98  65 - 100 MG/DL Final     Xr Spine Thorac 3 V    Result Date: 2/21/2018  EXAM:  XR SPINE THORAC 3 V INDICATION: Back pain after fall on 2/21/2018. COMPARISON: None. FINDINGS: AP, lateral and swimmers lateral views of the thoracic spine demonstrate kyphosis with degenerative change. No fracture or subluxation is identified. IMPRESSION: Thoracic kyphosis and spondylosis. No fracture or acute abnormality. Ct Head Wo Cont    Result Date: 2/22/2018  EXAM:  CT HEAD WITHOUT CONTRAST INDICATION: Fall. COMPARISON: 10/13/2015. CONTRAST: None. TECHNIQUE: Unenhanced CT of the head was performed using 5 mm images. Brain and bone windows were generated. Sagittal and coronal reformations were generated.  CT dose reduction was achieved through use of a standardized protocol tailored for this examination and automatic exposure control for dose modulation. CT dose reduction was achieved through use of a standardized protocol tailored for this examination and automatic exposure control for dose modulation. Adaptive statistical iterative reconstruction (ASIR) was utilized for this examination. FINDINGS: The ventricles and sulci are normal in size, shape and configuration and midline. There is no significant white matter disease. There is no intracranial hemorrhage. There is no extra-axial collection, mass, mass effect or midline shift. The basilar cisterns are open. No acute infarct is identified. The bone windows demonstrate no abnormalities. The visualized portions of the paranasal sinuses and mastoid air cells are clear. IMPRESSION: Normal unenhanced CT examination of the head. Xr Skull < 4 V(ltd)    Result Date: 2/21/2018  EXAM:  XR SKULL < 4 V(LTD) INDICATION:   Fall on 2/21/2018. COMPARISON:  None. [facility] FINDINGS:  AP and lateral views of the skull show no fracture or other significant bony abnormality. The sella turcica is not enlarged. IMPRESSION: Normal skull. Plain skull radiography is not a sensitive examination for evaluation of head trauma. DISPOSITION:    Home. Patient to f/u with o/p psychiatric, and psychotherapy appointments. Patient is to f/u with internist as directed. FOLLOW-UP CARE:    Activity as tolerated  Regular Diet  Wound Care: none needed.   Follow-up Information     Follow up With Details Comments Contact Info    Dr. Ruslan Abernathy [facility] [address] By MD Shane   [address]   [address]  [phone]      Nadya Kelly [address] [place] [address]  [phone]                   PROGNOSIS:  Greatly dependent upon patient's ability to f/u with o/p psychiatric/psychotherapy appointments as well as to comply with psychiatric medications as prescribed. Patient denies suicidal or homicidal ideations. Robbin Ramos fully contracts for safety. Patient reports many positive predictive factors in terms of not attempting suicide or homicide. Patient appears to be at low risk of suicide or homicide. Patient and family are aware and in agreement with discharge and discharge plan. DISCHARGE MEDICATIONS: (no changes made). Informed consent given for the use of following psychotropic medications:  Current Discharge Medication List      START taking these medications    Details   hydrOXYzine HCl (ATARAX) 25 mg tablet Take 1 Tab by mouth three (3) times daily for 10 days. Indications: anxiety  Qty: 90 Tab, Refills: 0         CONTINUE these medications which have CHANGED    Details   QUEtiapine (SEROQUEL) 50 mg tablet Take 1 Tab by mouth three (3) times daily. Indications: BIPOLAR DISORDER IN REMISSION  Qty: 90 Tab, Refills: 0      FLUoxetine (PROZAC) 20 mg capsule Take 3 Caps by mouth daily. Indications: ANXIETY WITH DEPRESSION  Qty: 90 Cap, Refills: 0      busPIRone (BUSPAR) 10 mg tablet Take 2 Tabs by mouth three (3) times daily. Indications: Generalized Anxiety Disorder  Qty: 90 Tab, Refills: 0         CONTINUE these medications which have NOT CHANGED    Details   alendronate (FOSAMAX) 70 mg tablet TAKE ONE TABLET WEEKLY ON FRIDAY 30 MINUTES BEFORE 1ST MEAL WITH FULLGLASS OF WATER.  DO NOT LIE DOWN FOR 30 MINUTES AFTER TAKING  Qty: 4 Tab, Refills: 5      cyanocobalamin (VITAMIN B-12) 1,000 mcg tablet TAKE 1 TABLET BY MOUTH DAILY  Qty: 30 Tab, Refills: PRN      calcium carbonate (CALTREX) 600 mg calcium (1,500 mg) tablet TAKE 1 TABLET BY MOUTH TWICE A DAY  Qty: 62 Tab, Refills: PRN      loratadine (CLARITIN) 10 mg tablet TAKE 1 TABLET BY MOUTH DAILY FOR ALLERGIES  Qty: 31 Tab, Refills: PRN      inulin (FIBER CHOICE, INULIN,) 1.5 gram chew Take 1 Tab by mouth two (2) times a day.  Qty: 60 Tab, Refills: 5      polyethylene glycol (MIRALAX) 17 gram/dose powder MIX 1 CAPFUL (=17GM) IN A GLASS OF WATER & DRINK ONCE DAILY AS NEEDEDFOR CONSTIPATION  Qty: 527 g, Refills: 0         STOP taking these medications       DOC-Q-LACE 100 mg capsule Comments:   Reason for Stopping:         esomeprazole (NEXIUM) 40 mg capsule Comments:   Reason for Stopping:         ergocalciferol (VITAMIN D2) 50,000 unit capsule Comments:   Reason for Stopping:         ALPRAZolam (XANAX) 0.25 mg tablet Comments:   Reason for Stopping:         Fiber (FIBER CHOICE) Chew Comments:   Reason for Stopping:                      A coordinated, multidisplinary treatment team round was conducted with Jae Bene is done daily here at [facility] . This team consists of the nurse, psychiatric unit pharmcist,  and writer. I have spent greater than 35 minutes on discharge work.     Signed:  João Newton MD  2/26/2018

## 2018-02-26 NOTE — BH NOTES
Behavioral Health Transition Record to Provider    Patient Name: Joshua Washburn  YOB: 1950  Medical Record Number: [id number]  Date of Admission: 2/20/2018  Date of Discharge: 2/26/2018    Attending Provider: Tony Santiago MD  Discharging Provider: Tony Santiago MD  To contact this individual call [phone] and ask the  to page. If unavailable, ask to be transferred to [address] [place] [address] Provider on call. [facility] Provider will be available on call 24/7 and during holidays. Primary Care Provider: Verneta Goldberg, MD    No Known Allergies    Reason for Admission: Patient was admitted under a voluntary basis for dementia and behavioral problems proving to be an imminent danger to self and others and an inability to care for self.     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    * No surgery found *    Results for orders placed or performed during the hospital encounter of 02/20/18   CBC W/O DIFF   Result Value Ref Range    WBC 6.3 3.6 - 11.0 K/uL    RBC 4.18 3.80 - 5.20 M/uL    HGB 12.6 11.5 - 16.0 g/dL    HCT 37.2 35.0 - 47.0 %    MCV 89.0 80.0 - 99.0 FL    MCH 30.1 26.0 - 34.0 PG    MCHC 33.9 30.0 - 36.5 g/dL    RDW 13.5 11.5 - 14.5 %    PLATELET 493 (L) 622 - 400 K/uL    MPV 12.2 8.9 - 12.9 FL    NRBC 0.0 0  WBC    ABSOLUTE NRBC 0.00 0.00 - 0.01 K/uL   LIPID PANEL   Result Value Ref Range    LIPID PROFILE          Cholesterol, total 219 (H) <200 MG/DL    Triglyceride 64 <150 MG/DL    HDL Cholesterol 66 MG/DL    LDL, calculated 140.2 (H) 0 - 100 MG/DL    VLDL, calculated 12.8 MG/DL    CHOL/HDL Ratio 3.3 0 - 5.0     METABOLIC PANEL, COMPREHENSIVE   Result Value Ref Range    Sodium 137 136 - 145 mmol/L    Potassium 3.7 3.5 - 5.1 mmol/L    Chloride 105 97 - 108 mmol/L    CO2 21 21 - 32 mmol/L    Anion gap 11 5 - 15 mmol/L    Glucose 120 (H) 65 - 100 mg/dL    BUN 16 6 - 20 MG/DL    Creatinine 0.78 0.55 - 1.02 MG/DL    BUN/Creatinine ratio 21 (H) 12 - 20      GFR est AA >60 >60 ml/min/1.73m2    GFR est non-AA >60 >60 ml/min/1.73m2    Calcium 9.1 8.5 - 10.1 MG/DL    Bilirubin, total 0.8 0.2 - 1.0 MG/DL    ALT (SGPT) 20 12 - 78 U/L    AST (SGOT) 18 15 - 37 U/L    Alk. phosphatase 76 45 - 117 U/L    Protein, total 7.5 6.4 - 8.2 g/dL    Albumin 3.5 3.5 - 5.0 g/dL    Globulin 4.0 2.0 - 4.0 g/dL    A-G Ratio 0.9 (L) 1.1 - 2.2     TSH 3RD GENERATION   Result Value Ref Range    TSH 0.73 0.36 - 3.74 uIU/mL   HEMOGLOBIN A1C WITH EAG   Result Value Ref Range    Hemoglobin A1c 5.4 4.2 - 6.3 %    Est. average glucose 108 mg/dL   CK   Result Value Ref Range     26 - 192 U/L   GLUCOSE, FASTING   Result Value Ref Range    Glucose 98 65 - 100 MG/DL       Immunizations administered during this encounter:   Immunization History   Administered Date(s) Administered    Influenza High Dose Vaccine PF 08/31/2015    Influenza Vaccine 10/16/2013, 10/27/2014    Influenza Vaccine Split 10/01/2011    Influenza Vaccine Whole 01/01/2005    PPD 06/23/2008    Pneumococcal Conjugate (PCV-13) 08/31/2015    Pneumococcal Polysaccharide (PPSV-23) 11/08/2016    TB Skin Test (PPD) Intradermal 08/28/2015    TD Vaccine 01/01/1999    TDAP Vaccine 07/14/2009    Zoster 11/12/2012       Screening for Metabolic Disorders for Patients on Antipsychotic Medications  (Data obtained from the EMR)    Estimated Body Mass Index  Estimated body mass index is 33.26 kg/(m^2) as calculated from the following:    Height as of this encounter: 4' 11.06\" (1.5 m). Weight as of this encounter: 74.8 kg (165 lb).      Vital Signs/Blood Pressure  Visit Vitals    /76    Pulse 66    Temp 97.6 °F (36.4 °C)    Resp 16    Ht 4' 11.06\" (1.5 m)    Wt 74.8 kg (165 lb)    SpO2 96%    Breastfeeding No    BMI 33.26 kg/m2       Blood Glucose/Hemoglobin A1c  Lab Results   Component Value Date/Time    Glucose 98 02/23/2018 06:45 AM       Lab Results   Component Value Date/Time    Hemoglobin A1c 5.4 2018 06:01 AM        Lipid Panel  Lab Results   Component Value Date/Time    Cholesterol, total 219 (H) 2018 06:01 AM    HDL Cholesterol 66 2018 06:01 AM    LDL, calculated 140.2 (H) 2018 06:01 AM    Triglyceride 64 2018 06:01 AM    CHOL/HDL Ratio 3.3 2018 06:01 AM        Discharge Diagnosis: Dementia with behavioral disturbance (ICD-10-CM: F03.91); 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 DISCHARGE SUMMARY    NAME:Shanika Munoz  : 1950  MRN: [id number]    The patient Robbin Ramos exhibits the ability to control behavior in a less restrictive environment. Patient's level of functioning is improving. No assaultive/destructive behavior has been observed for the past 24 hours. No suicidal/homicidal threat or behavior has been observed for the past 24 hours. There is no evidence of serious medication side effects. Patient has not been in physical or protective restraints for at least the past 24 hours. If weapons involved, how are they secured? No weapons involved. Is patient aware of and in agreement with discharge plan? Patient and family are aware and in agreement. Arrangements for medication:  Prescriptions given to patient. Referral for substance abuse treatment? Patient is not using substances/Not applicable. Referral for smoking cessation needed? Patient is not a smoker/Not applicable. Copy of discharge instructions to provider?:  Dr. De Los Santos Estimable for transportation home:  Group home to . Keep all follow up appointments as scheduled, continue to take prescribed medications per physician instructions.   Mental health crisis number:  275 or your local mental health crisis line number at [phone] [facility]) or [phone] ([facility]). Discharge Medication List and Instructions:   Current Discharge Medication List      START taking these medications    Details   hydrOXYzine HCl (ATARAX) 25 mg tablet Take 1 Tab by mouth three (3) times daily for 10 days. Indications: anxiety  Qty: 90 Tab, Refills: 0         CONTINUE these medications which have CHANGED    Details   QUEtiapine (SEROQUEL) 50 mg tablet Take 1 Tab by mouth three (3) times daily. Indications: BIPOLAR DISORDER IN REMISSION  Qty: 90 Tab, Refills: 0      FLUoxetine (PROZAC) 20 mg capsule Take 3 Caps by mouth daily. Indications: ANXIETY WITH DEPRESSION  Qty: 90 Cap, Refills: 0      busPIRone (BUSPAR) 10 mg tablet Take 2 Tabs by mouth three (3) times daily. Indications: Generalized Anxiety Disorder  Qty: 90 Tab, Refills: 0         CONTINUE these medications which have NOT CHANGED    Details   alendronate (FOSAMAX) 70 mg tablet TAKE ONE TABLET WEEKLY ON FRIDAY 30 MINUTES BEFORE 1ST MEAL WITH FULLGLASS OF WATER. DO NOT LIE DOWN FOR 30 MINUTES AFTER TAKING  Qty: 4 Tab, Refills: 5      cyanocobalamin (VITAMIN B-12) 1,000 mcg tablet TAKE 1 TABLET BY MOUTH DAILY  Qty: 30 Tab, Refills: PRN      calcium carbonate (CALTREX) 600 mg calcium (1,500 mg) tablet TAKE 1 TABLET BY MOUTH TWICE A DAY  Qty: 62 Tab, Refills: PRN      loratadine (CLARITIN) 10 mg tablet TAKE 1 TABLET BY MOUTH DAILY FOR ALLERGIES  Qty: 31 Tab, Refills: PRN      inulin (FIBER CHOICE, INULIN,) 1.5 gram chew Take 1 Tab by mouth two (2) times a day.   Qty: 60 Tab, Refills: 5      polyethylene glycol (MIRALAX) 17 gram/dose powder MIX 1 CAPFUL (=17GM) IN A GLASS OF WATER & DRINK ONCE DAILY AS NEEDEDFOR CONSTIPATION  Qty: 527 g, Refills: 0         STOP taking these medications       DOC-Q-LACE 100 mg capsule Comments:   Reason for Stopping:         esomeprazole (NEXIUM) 40 mg capsule Comments:   Reason for Stopping:         ergocalciferol (VITAMIN D2) 50,000 unit capsule Comments:   Reason for Stopping: ALPRAZolam (XANAX) 0.25 mg tablet Comments:   Reason for Stopping:         Fiber (FIBER CHOICE) Chew Comments:   Reason for Stopping:               Unresulted Labs     None        To obtain results of studies pending at discharge, please contact [phone]. Follow-up Information     Follow up With Details Comments [address], [place]   [address]  [address] [place] [address]      Rj Black MD On 3/26/2018 You have an 11:30am appointment with your psychiatrist. [place] [address]  [phone]            Advanced Directive:   Does the patient have an appointed surrogate decision maker? Yes  Does the patient have a Medical Advance Directive? Yes  Does the patient have a Psychiatric Advance Directive? Yes  If the patient does not have a surrogate or Medical Advance Directive AND Psychiatric Advance Directive, the patient was offered information on these advance directives Yes and Patient declined to complete    Patient Instructions: Please continue all medications until otherwise directed by physician. What to do at Home:  Recommended activity: Activity as tolerated,     If you experience any of the following symptoms thoughts of harming self, feeling overwhelmed with anxiety, sadness or loneliness, please follow up with your staff. *  Please give a list of your current medications to your Primary Care Provider. *  Please update this list whenever your medications are discontinued, doses are      changed, or new medications (including over-the-counter products) are added. *  Please carry medication information at all times in case of emergency situations. Tobacco Cessation Discharge Plan:   Is the patient a smoker and needs referral for smoking cessation? No  Patient referred to the following for smoking cessation with an appointment?  Not applicable     Patient was offered medication to assist with smoking cessation at discharge? Not applicable  Was education for smoking cessation added to the discharge instructions? Not applicable    Alcohol/Substance Abuse Discharge Plan:   Does the patient have a history of substance/alcohol abuse and requires a referral for treatment? No  Patient referred to the following for substance/alcohol abuse treatment with an appointment? Not applicable  Patient was offered medication to assist with alcohol cessation at discharge? Not applicable  Was education for substance/alcohol abuse added to discharge instructions? Not applicable    Patient discharged to Home; discussed with patient/caregiver and provided to the patient/caregiver either in hard copy or electronically.

## 2018-02-26 NOTE — DISCHARGE INSTRUCTIONS
DISCHARGE SUMMARY    NAME:Shanika Britt  : 1950  MRN: [id number]    The patient Faith Ortiz exhibits the ability to control behavior in a less restrictive environment. Patient's level of functioning is improving. No assaultive/destructive behavior has been observed for the past 24 hours. No suicidal/homicidal threat or behavior has been observed for the past 24 hours. There is no evidence of serious medication side effects. Patient has not been in physical or protective restraints for at least the past 24 hours. If weapons involved, how are they secured? No weapons involved. Is patient aware of and in agreement with discharge plan? Patient and family are aware and in agreement. Arrangements for medication:  Prescriptions given to patient. Referral for substance abuse treatment? Patient is not using substances/Not applicable. Referral for smoking cessation needed? Patient is not a smoker/Not applicable. Copy of discharge instructions to provider?:  Dr. John Drafts for transportation home:  Group home to . Keep all follow up appointments as scheduled, continue to take prescribed medications per physician instructions. Mental health crisis number:  215 or your local mental health crisis line number at [phone] [facility] or [phone] ([facility]). DISCHARGE SUMMARY from Nurse    PATIENT INSTRUCTIONS:    What to do at Home:  Recommended activity: Activity as tolerated,     If you experience any of the following symptoms thoughts of harming self, feeling overwhelmed with anxiety, sadness or loneliness, please follow up with your staff. *  Please give a list of your current medications to your Primary Care Provider. *  Please update this list whenever your medications are discontinued, doses are      changed, or new medications (including over-the-counter products) are added.     *  Please carry medication information at all times in case of emergency situations. These are general instructions for a healthy lifestyle:    No smoking/ No tobacco products/ Avoid exposure to second hand smoke  Surgeon General's Warning:  Quitting smoking now greatly reduces serious risk to your health. Obesity, smoking, and sedentary lifestyle greatly increases your risk for illness    A healthy diet, regular physical exercise & weight monitoring are important for maintaining a healthy lifestyle    You may be retaining fluid if you have a history of heart failure or if you experience any of the following symptoms:  Weight gain of 3 pounds or more overnight or 5 pounds in a week, increased swelling in our hands or feet or shortness of breath while lying flat in bed. Please call your doctor as soon as you notice any of these symptoms; do not wait until your next office visit. Recognize signs and symptoms of STROKE:    F-face looks uneven    A-arms unable to move or move unevenly    S-speech slurred or non-existent    T-time-call 911 as soon as signs and symptoms begin-DO NOT go       Back to bed or wait to see if you get better-TIME IS BRAIN. Warning Signs of HEART ATTACK     Call 911 if you have these symptoms:   Chest discomfort. Most heart attacks involve discomfort in the center of the chest that lasts more than a few minutes, or that goes away and comes back. It can feel like uncomfortable pressure, squeezing, fullness, or pain.  Discomfort in other areas of the upper body. Symptoms can include pain or discomfort in one or both arms, the back, neck, jaw, or stomach.  Shortness of breath with or without chest discomfort.  Other signs may include breaking out in a cold sweat, nausea, or lightheadedness. Don't wait more than five minutes to call 911 - MINUTES MATTER! Fast action can save your life. Calling 911 is almost always the fastest way to get lifesaving treatment.  Emergency Medical Services staff can begin treatment when they arrive -- up to an hour sooner than if someone gets to the hospital by car. The discharge information has been reviewed with the patient. The patient verbalized understanding. Discharge medications reviewed with the patient and appropriate educational materials and side effects teaching were provided.   ___________________________________________________________________________________________________________________________________

## 2018-02-26 NOTE — PROGRESS NOTES
GROUP THERAPY PROGRESS NOTE    Brenda Whitfield is participating in Positive thoughts. Group time: 20 minutes    Personal goal for participation: Think of things your grateful for, pt was grateful for family (brother)    Goal orientation: personal    Group therapy participation: active    Therapeutic interventions reviewed and discussed: Discussed importance of expressing gratitude once a day or at least once a week. Discussed how comparing what others have to what you don't have is unhealthy, so it's important to focus on and be grateful for what you do have.     Impression of participation: Participated in worksheet, shared thoughts with peers, actively listened, engaged in positive conversation

## 2018-02-26 NOTE — BH NOTES
GROUP THERAPY PROGRESS NOTE    Faith Ortiz is participating in Process Group.      Group time: 35 minutes    Personal goal for participation: To gain Awareness    Goal orientation: personal    Group therapy participation: Patient did not attend even though encouraged by staff to do so    Therapeutic interventions reviewed and discussed:     Impression of participation:

## 2018-02-26 NOTE — INTERDISCIPLINARY ROUNDS
Behavioral Health Interdisciplinary Rounds     Patient Name: Farheen Beltran  Age: 75 y.o.   Room/Bed:  [facility]/  Primary Diagnosis: Dementia with behavioral disturbance   Admission Status: Voluntary     Readmission within 30 days: no  Power of  in place: yes - Brother  Patient requires a blocked bed: no          Reason for blocked bed: n/a    VTE Prophylaxis: No  Flu vaccine given : no   Mobility needs/Fall risk: no    Nutritional Plan: no  Consults:          Labs/Testing due today?: no    Sleep hours: 8.0       Participation in Care/Groups:  no  Medication Compliant?: Yes  PRNS (last 24 hours): None    Restraints (last 24 hours):  no  Substance Abuse:  no  CIWA (range last 24 hours):  COWS (range last 24 hours):   Alcohol screening (AUDIT) completed -  AUDIT Score: 0  If applicable, date SBIRT discussed in treatment team AND documented:   Tobacco - patient is a smoker: no   Date tobacco education completed by RN: n/a  24 hour chart check complete: yes     Patient goal(s) for today:   Treatment team focus/goals:   Progress note     LOS:  6  Expected LOS:     Financial concerns/prescription coverage:    Date of last family contact:       Family requesting physician contact today:    Discharge plan:   Guns in the home:         Outpatient provider(s):     Participating treatment team members: Farhene Beltran, * (assigned SW),

## 2018-02-26 NOTE — PROGRESS NOTES
Problem: Depressed Mood (Adult/Pediatric)  Goal: *STG: Participates in treatment plan  Outcome: Progressing Towards Goal  Med compliant, out on unit smiling and engaged. Daily goal is to d/c home. Denies SI, no self harming behaviors. Demonstrates ability to follow staff direction and unit routine.

## 2018-02-26 NOTE — PROGRESS NOTES
Problem: Depressed Mood (Adult/Pediatric)  Goal: *STG: Participates in treatment plan  Outcome: Progressing Towards Goal  Mood is bright. Pt socializes with peers. Problem: Falls - Risk of  Goal: *Absence of Falls  Document Eli Fall Risk and appropriate interventions in the flowsheet. Outcome: Progressing Towards Goal  Fall Risk Interventions:       Mentation Interventions: Adequate sleep, hydration, pain control    Medication Interventions: Teach patient to arise slowly    Elimination Interventions: Toilet paper/wipes in reach    History of Falls Interventions: Door open when patient unattended   Gait is steady. Pt is free from falls. Problem: Discharge Planning  Goal: *Discharge to safe environment  Outcome: Progressing Towards Goal  The plan is for pt to be discharged tomorrow.

## 2018-02-26 NOTE — PROGRESS NOTES
Problem: Falls - Risk of  Goal: *Absence of Falls  Document Eli Fall Risk and appropriate interventions in the flowsheet. Outcome: Progressing Towards Goal  Fall Risk Interventions:   Pt in bed asleep. NAD. No falls noted/reported. Q 15 minute checks for safety maintained. Mentation Interventions: Adequate sleep, hydration, pain control    Medication Interventions: Teach patient to arise slowly    Elimination Interventions:  Toilet paper/wipes in reach    History of Falls Interventions: Door open when patient unattended

## 2018-02-26 NOTE — BH NOTES
GROUP THERAPY PROGRESS NOTE    Yusuf La [Edith] actively participated in a morning Process Group on the General Unit with a focus identifying feelings,   planning for the day, and learning more about DBT concepts on \"Emotion Regulation. \"    .  Group time: 65 minutes. Personal goal for participation: To increase the capacity to improve ones mood, set personal goals,   and understand more about basic activities to help regulate emotions. Goal orientation: The patients will be able to identify their feelings and develop a plan for   structuring their day. They were also presented with a summary sheet on emotional regulation,   in regards to focusing on one goal per day, taking physical care of oneself, and recognizing   and/or building positive experiences. The didactic portion of the session focused on these three  concepts; 1) defining and focusing on one goal per day; 2) taking care of ones physical   maintenance and basic needs - sleep, nutrition, and exercise; and 3) finding and building on   positive experiences. Group therapy participation: With prompting, this patient participated in the group. Therapeutic interventions reviewed and discussed: The group members were asked to   identify an emotion they are having and/or let the group know what they want to focus on for the   day as they continue to make discharge plans. The group members reviewed three DBT   suggestions regarding emotional regulation, in regards to focusing on one goal per day, taking   physical care of oneself, and recognizing and/or building positive experiences. It was suggested   that these three concepts can be seen as headings for their list of coping skills. The group   members were also provided worksheets on the topic discussed for their review and use on   their own time. Impression of participation: The patient said she might be leaving the hospital in the near future.    She said she thought she is making progress by taking the medications prescribed, not hitting  anyone, and \"feeling happy. \" She said she woke up in a positive attitude this morning. She expressed no current SI/HI and displayed no overt psychotic symptoms in this group. Her affect was mildly euphoric and her mood matched her affect. With the assistance of  her treatment team, she is finalizing her aftercare plans.

## 2018-02-27 ENCOUNTER — PATIENT OUTREACH (OUTPATIENT)
Dept: INTERNAL MEDICINE CLINIC | Age: 68
End: 2018-02-27

## 2018-02-27 NOTE — PROGRESS NOTES
Hospital Discharge Follow-Up      Date/Time:  2018 9:10 AM    Patient was admitted to [facility] on 18 and discharged on 18 for aggressive behavior in group home. The physician discharge summary was available at the time of outreach. Patient contacted within 1 business days of discharge. Inpatient RRAT score: 18    Top challenges reviewed with the provider:    1.  and  - trending down. Not on any statins    Method of communication with provider :chart routing    Nurse Navigator(NN) contacted the caregiver  by telephone to perform post hospital discharge assessment. Verified name and  with caregiver as identifiers. Provided introduction to self, and explanation of the Nurse Navigator role. Reviewed discharge instructions and red flags with caregiver who verbalizes understanding. caregiver given an opportunity to ask questions and does not have any further questions or concerns at this time. The caregiver agrees to contact the PCP office for questions related to their healthcare. NN provided contact information for future reference. Summary of patients top problems:  1. Compliance with Psychiatric treatment    Home Health orders at discharge: [address]: NA  Date of initial visit: NA    Durable Medical Equipment ordered/company: No  Durable Medical Equipment received: NA    Barriers to care? support system, transportation    Medication:   Medication reconciliation was preformed with caregiver, who verbalizes understanding of administration of home medications. There were no barriers to obtaining medications identified at this time. New medications at discharge include   hydrOXYzine HCl (ATARAX) 25 mg tablet Take 1 Tab by mouth three (3) times daily for 10 days.  Indications: anxiety  Qty: 90 Tab, Refills: 0     Does the patient have new prescription(s) to last until follow up with prescribing provider: jayla Cruz mgayo will be picking up prescriptions today from pharmacy. Prescription Medication total: 5 (pharmacy consult for polypharm needed?) no     Medication changes (dose adjustments or discontinued meds):   QUEtiapine (SEROQUEL) 50 mg tablet Take 1 Tab by mouth three (3) times daily. Indications: BIPOLAR DISORDER IN REMISSION  Qty: 90 Tab, Refills: 0       FLUoxetine (PROZAC) 20 mg capsule Take 3 Caps by mouth daily. Indications: ANXIETY WITH DEPRESSION  Qty: 90 Cap, Refills: 0       busPIRone (BUSPAR) 10 mg tablet Take 2 Tabs by mouth three (3) times daily.  Indications: Generalized Anxiety Disorder  Qty: 90 Tab, Refills: 0       STOP taking these medications         DOC-Q-LACE 100 mg capsule Comments:   Reason for Stopping:            esomeprazole (NEXIUM) 40 mg capsule Comments:   Reason for Stopping:            ergocalciferol (VITAMIN D2) 50,000 unit capsule Comments:   Reason for Stopping:            ALPRAZolam (XANAX) 0.25 mg tablet Comments:   Reason for Stopping:            Fiber (FIBER CHOICE) Chew Comments:   Reason for Stopping:      Advance Care Planning:   Does patient have an Advance Directive:  reviewed and current     PCP/Specialist follow up:   Future Appointments  Date Time Provider Christian Kizzy   4/26/2018 10:40 AM Sal Black MD [facility]   6/13/2018 9:45 AM Jesus Ladd MD [id number] [facility]        Goals      Compliance with Psychiatric treatment            2/27/18 - Pt will be compliant with o/p psychiatric/psychotherapy appointments as well as to comply with psychiatric medications as prescribed  F/U appt with Dr Familia Shirley on 3/8/18  Richy Villeda RN, BSN, [facility]  Ambulatory Nurse Navigator  [facility]

## 2018-03-06 ENCOUNTER — TELEPHONE (OUTPATIENT)
Dept: NEUROLOGY | Age: 68
End: 2018-03-06

## 2018-03-06 NOTE — TELEPHONE ENCOUNTER
Pt's facility calling to see if the pt needs to be seen sooner than April 26th. She was just seen in the psych [facility].  Please call back

## 2018-03-13 ENCOUNTER — OFFICE VISIT (OUTPATIENT)
Dept: NEUROLOGY | Age: 68
End: 2018-03-13

## 2018-03-13 ENCOUNTER — TELEPHONE (OUTPATIENT)
Dept: NEUROLOGY | Age: 68
End: 2018-03-13

## 2018-03-13 VITALS
HEIGHT: 59 IN | RESPIRATION RATE: 14 BRPM | WEIGHT: 170 LBS | BODY MASS INDEX: 34.27 KG/M2 | DIASTOLIC BLOOD PRESSURE: 70 MMHG | SYSTOLIC BLOOD PRESSURE: 120 MMHG | HEART RATE: 85 BPM | OXYGEN SATURATION: 96 %

## 2018-03-13 DIAGNOSIS — R62.50 DEVELOPMENTAL DELAY: ICD-10-CM

## 2018-03-13 DIAGNOSIS — F03.91 DEMENTIA WITH BEHAVIORAL DISTURBANCE, UNSPECIFIED DEMENTIA TYPE: Primary | ICD-10-CM

## 2018-03-13 RX ORDER — HYDROXYZINE 25 MG/1
TABLET, FILM COATED ORAL
COMMUNITY

## 2018-03-13 RX ORDER — DOCUSATE SODIUM 100 MG/1
100 CAPSULE, LIQUID FILLED ORAL 2 TIMES DAILY
COMMUNITY
End: 2018-12-12 | Stop reason: SDUPTHER

## 2018-03-13 RX ORDER — MEMANTINE HYDROCHLORIDE 5 MG-10 MG
KIT ORAL
Qty: 30 TAB | Refills: 0 | Status: SHIPPED | OUTPATIENT
Start: 2018-03-13 | End: 2018-03-19 | Stop reason: SDUPTHER

## 2018-03-13 RX ORDER — ESOMEPRAZOLE MAGNESIUM 40 MG/1
CAPSULE, DELAYED RELEASE ORAL DAILY
COMMUNITY
End: 2018-06-14 | Stop reason: SDUPTHER

## 2018-03-13 NOTE — PROGRESS NOTES
Chief Complaint   Patient presents with    Memory Loss         HISTORY OF PRESENT ILLNESS  Cody Rehman came back for follow-up. She came with her caregiver. She was recently hospitalized to inpatient psychiatric unit for behavioral issues and suicidal ideation. She is now on Seroquel 50 mg 3 times a day and is also on BuSpar. She has been doing quite well since discharge. She also has dementia at baseline with baseline developmental delay/cognitive impairment. She needs assistance with most activities of daily living. She is living in a group home. She was prescribed memantine at the last time but apparently this was never started. She was on Aricept at some point but it was causing diarrhea and was discontinued. She had oral dyskinesias which improved after she stopped Risperdal      Current Outpatient Prescriptions   Medication Sig    hydrOXYzine HCl (ATARAX) 25 mg tablet Take  by mouth three (3) times daily as needed for Itching.  calcium carbonate-vitamin d2 500 mg(1,250mg) -200 unit tab Take  by mouth.  esomeprazole (NEXIUM) 40 mg capsule Take  by mouth daily.  docusate sodium (COLACE) 100 mg capsule Take 100 mg by mouth two (2) times a day.  memantine 5-10 mg DsPk Take as directed    QUEtiapine (SEROQUEL) 50 mg tablet Take 1 Tab by mouth three (3) times daily. Indications: BIPOLAR DISORDER IN REMISSION    FLUoxetine (PROZAC) 20 mg capsule Take 3 Caps by mouth daily. Indications: ANXIETY WITH DEPRESSION    busPIRone (BUSPAR) 10 mg tablet Take 2 Tabs by mouth three (3) times daily. Indications: Generalized Anxiety Disorder    alendronate (FOSAMAX) 70 mg tablet TAKE ONE TABLET WEEKLY ON FRIDAY 30 MINUTES BEFORE 1ST MEAL WITH FULLGLASS OF WATER.  DO NOT LIE DOWN FOR 30 MINUTES AFTER TAKING    cyanocobalamin (VITAMIN B-12) 1,000 mcg tablet TAKE 1 TABLET BY MOUTH DAILY    calcium carbonate (CALTREX) 600 mg calcium (1,500 mg) tablet TAKE 1 TABLET BY MOUTH TWICE A DAY    loratadine (CLARITIN) 10 mg tablet TAKE 1 TABLET BY MOUTH DAILY FOR ALLERGIES    inulin (FIBER CHOICE, INULIN,) 1.5 gram chew Take 1 Tab by mouth two (2) times a day.  polyethylene glycol (MIRALAX) 17 gram/dose powder MIX 1 CAPFUL (=17GM) IN A GLASS OF WATER & DRINK ONCE DAILY AS NEEDEDFOR CONSTIPATION     No current facility-administered medications for this visit. PHYSICAL EXAMINATION:    Visit Vitals    /70    Pulse 85    Resp 14    Ht 4' 11\" (1.499 m)    Wt 77.1 kg (170 lb)    SpO2 96%    BMI 34.34 kg/m2       NEUROLOGICAL EXAMINATION:     Mental Status:   Alert and oriented to person, place. She does quite poorly on cognitive assessment and has difficulties with visuospatial, executive function, memory, abstraction and orientation. She could tell me the date but could not get the month right. She knew that she is in [facility] and this is a hospital.  She could draw a [place] but had difficulty putting all the numbers that are on a clock. She knows her date of birth. Cranial Nerves:    II, III, IV, VI:  Visual acuity grossly intact. Visual fields are normal.    Pupils are equal, round, and reactive to light and accommodation. Extra-ocular movements are full and fluid. Fundoscopic exam was benign, no ptosis or nystagmus. V-XII: Hearing is grossly intact. Facial features are symmetric, with normal sensation and strength. The palate rises symmetrically and the tongue protrudes midline. Sternocleidomastoids 5/5. Motor Examination: Normal tone, bulk, and strength. 5/5 muscle strength throughout. No cogwheel rigidity or clonus present. Sensory exam:  Normal throughout to pinprick, temperature, and vibration sense. Normal proprioception. Coordination:  Heel-to-shin was smooth and symmetrical bilaterally.   Finger to nose and rapid arm movement testing was normal.   No resting or intention tremor    Gait and Station:  Steady while walking on toes, heels, and with tandem walking. Normal arm swing. No Rhomberg or pronator drift. No muscle wasting or fasiculations noted. Reflexes:  DTRs 2+ throughout. Toes downgoing. LABS / IMAGING  Lab Results   Component Value Date/Time    TSH 0.73 02/22/2018 06:01 AM     Lab Results   Component Value Date/Time    Vitamin B12 993 (H) 11/17/2016 08:48 AM    Folate 17.7 12/04/2015 10:10 AM     ASSESSMENT    ICD-10-CM ICD-9-CM    1. Dementia with behavioral disturbance, unspecified dementia type F03.91 294.21 memantine 5-10 mg DsPk   2. Developmental delay R62.50 783.40        DISCUSSION  Ms. Zaynab Holder has baseline cognitive impairment to a moderate degree related to developmental delay. I suspect that there is superimposed neurodegenerative dementia that is setting in but she appears more or less at her baseline. I have recommended restarting Memantine and staff will let me know if there are any tolerability issues. She could not tolerate Aricept as it caused diarrhea.    Continue follow-up with psychiatry  We will continue clinical monitoring       Jose Rafael Mcleod MD  Diplomate, American Board of Psychiatry & Neurology (Neurology)  [place] Board of Psychiatry & Neurology (Clinical Neurophysiology)  Diplomate, American Board of Electrodiagnostic Medicine

## 2018-03-13 NOTE — MR AVS SNAPSHOT
[facility] [address] [address] [place] [address] 
[phone] Patient: Xena Alvarez MRN:  [id number] Visit Information Date & Time Provider Department Dept. Phone Encounter #  
 3/13/2018  1:30 PM Therese Hagen MD [facility] [phone] Follow-up Instructions Return in about 3 months (around 6/13/2018). Your Appointments 4/26/2018 10:40 AM  
Follow Up with Therese Hagen MD  
[facility] [address] [place] [address]) Appt Note: 6 month follow up KRU 10/30  
 [address] [place] [address] [id number] [phone]  
  
   
 [address] [place] [id number]  
  
    
 6/13/2018  9:45 AM  
ROUTINE CARE with Fly Fraser MD  
[address] [address] Internal Medicine [facility]) Appt Note: F/u 6m  
 [address] [facility] [address]  
One [facility] [place] [address] Upcoming Health Maintenance Date Due  
 GLAUCOMA SCREENING Q2Y 5/6/2017 Influenza Age 5 to Adult 8/1/2017 MEDICARE YEARLY EXAM 11/9/2017 BREAST CANCER SCRN MAMMOGRAM 4/20/2018 DTaP/Tdap/Td series (2 - Td) 7/14/2019 COLONOSCOPY 7/6/2027 Allergies as of 3/13/2018  Review Complete On: 3/13/2018 By: Therese Hagen MD  
 No Known Allergies Current Immunizations  Reviewed on 2/21/2018 Name Date Influenza High Dose Vaccine PF 8/31/2015 Influenza Vaccine 10/27/2014, 10/16/2013 Influenza Vaccine Split 10/1/2011 Influenza Vaccine Whole 1/1/2005 PPD 6/23/2008 Pneumococcal Conjugate (PCV-13) 8/31/2015 Pneumococcal Polysaccharide (PPSV-23) 11/8/2016 TB Skin Test (PPD) Intradermal 8/28/2015 TD Vaccine 1/1/1999 TDAP Vaccine 7/14/2009 Zoster 11/12/2012 Not reviewed this visit You Were Diagnosed With   
  
 Codes Comments Dementia with behavioral disturbance, unspecified dementia type    -  Primary ICD-10-CM: F03.91 
ICD-9-CM: 294.21 Developmental delay     ICD-10-CM: R62.50 ICD-9-CM: 783.40 Vitals BP Pulse Resp Height(growth percentile) Weight(growth percentile) SpO2  
 120/70 85 14 4' 11\" (1.499 m) 170 lb (77.1 kg) 96% BMI OB Status Smoking Status 34.34 kg/m2 Postmenopausal Never Smoker Vitals History BMI and BSA Data Body Mass Index Body Surface Area  
 34.34 kg/m 2 1.79 m 2 Preferred Pharmacy Pharmacy Name Phone [phone] [phone] Your Updated Medication List  
  
   
This list is accurate as of 3/13/18  2:11 PM.  Always use your most recent med list.  
  
  
  
  
 alendronate 70 mg tablet Commonly known as:  FOSAMAX TAKE ONE TABLET WEEKLY ON FRIDAY 30 MINUTES BEFORE 1ST MEAL WITH FULLGLASS OF WATER. DO NOT LIE DOWN FOR 30 MINUTES AFTER TAKING  
  
 busPIRone 10 mg tablet Commonly known as:  BUSPAR Take 2 Tabs by mouth three (3) times daily. Indications: Generalized Anxiety Disorder  
  
 calcium carbonate 600 mg calcium (1,500 mg) tablet Commonly known as:  CALTREX  
TAKE 1 TABLET BY MOUTH TWICE A DAY  
  
 calcium carbonate-vitamin d2 500 mg(1,250mg) -200 unit Tab Take  by mouth.  
  
 cyanocobalamin 1,000 mcg tablet Commonly known as:  VITAMIN B-12 TAKE 1 TABLET BY MOUTH DAILY docusate sodium 100 mg capsule Commonly known as:  Montey Roch Take 100 mg by mouth two (2) times a day. FLUoxetine 20 mg capsule Commonly known as:  PROzac Take 3 Caps by mouth daily. Indications: ANXIETY WITH DEPRESSION  
  
 hydrOXYzine HCl 25 mg tablet Commonly known as:  ATARAX Take  by mouth three (3) times daily as needed for Itching. inulin 1.5 gram Roland Shown Commonly known as:  FIBER CHOICE (INULIN) Take 1 Tab by mouth two (2) times a day. loratadine 10 mg tablet Commonly known as:  CLARITIN  
TAKE 1 TABLET BY MOUTH DAILY FOR ALLERGIES  
  
 memantine 5-10 mg Dspk Take as directed NexIUM 40 mg capsule Generic drug:  esomeprazole Take  by mouth daily. polyethylene glycol 17 gram/dose powder Commonly known as:  Bob Banks MIX 1 CAPFUL (=17GM) IN A GLASS OF WATER & DRINK ONCE DAILY AS NEEDEDFOR CONSTIPATION  
  
 QUEtiapine 50 mg tablet Commonly known as:  SEROquel Take 1 Tab by mouth three (3) times daily. Indications: BIPOLAR DISORDER IN REMISSION Prescriptions Sent to Pharmacy Refills  
 memantine 5-10 mg DsPk 0 Sig: Take as directed Class: Normal  
 Pharmacy: [address] [place] [address] #: [phone] Follow-up Instructions Return in about 3 months (around 6/13/2018). Patient Instructions A Healthy Lifestyle: Care Instructions Your Care Instructions A healthy lifestyle can help you feel good, stay at a healthy weight, and have plenty of energy for both work and play. A healthy lifestyle is something you can share with your whole family. A healthy lifestyle also can lower your risk for serious health problems, such as high blood pressure, heart disease, and diabetes. You can follow a few steps listed below to improve your health and the health of your family. Follow-up care is a key part of your treatment and safety. Be sure to make and go to all appointments, and call your doctor if you are having problems. It's also a good idea to know your test results and keep a list of the medicines you take. How can you care for yourself at home? · Do not eat too much sugar, fat, or fast foods. You can still have dessert and treats now and then. The goal is moderation. · Start small to improve your eating habits. Pay attention to portion sizes, drink less juice and soda pop, and eat more fruits and vegetables. ¨ Eat a healthy amount of food. A 3-ounce serving of meat, for example, is about the size of a deck of cards. Fill the rest of your plate with vegetables and whole grains. ¨ Limit the amount of soda and sports drinks you have every day. Drink more water when you are thirsty. ¨ Eat at least 5 servings of fruits and vegetables every day. It may seem like a lot, but it is not hard to reach this goal. A serving or helping is 1 piece of fruit, 1 cup of vegetables, or 2 cups of leafy, raw vegetables. Have an apple or some carrot sticks as an afternoon snack instead of a candy bar. Try to have fruits and/or vegetables at every meal. 
· Make exercise part of your daily routine. You may want to start with simple activities, such as walking, bicycling, or slow swimming. Try to be active 30 to 60 minutes every day. You do not need to do all 30 to 60 minutes all at once. For example, you can exercise 3 times a day for 10 or 20 minutes. Moderate exercise is safe for most people, but it is always a good idea to talk to your doctor before starting an exercise program. 
· Keep moving. Tabor Sheree the lawn, work in the garden, or Livonia Locksmith. Take the stairs instead of the elevator at work. · If you smoke, quit. People who smoke have an increased risk for heart attack, stroke, cancer, and other lung illnesses. Quitting is hard, but there are ways to boost your chance of quitting tobacco for good. ¨ Use nicotine gum, patches, or lozenges. ¨ Ask your doctor about stop-smoking programs and medicines. ¨ Keep trying. In addition to reducing your risk of diseases in the future, you will notice some benefits soon after you stop using tobacco. If you have shortness of breath or asthma symptoms, they will likely get better within a few weeks after you quit. · Limit how much alcohol you drink. Moderate amounts of alcohol (up to 2 drinks a day for men, 1 drink a day for women) are okay.  But drinking too much can lead to liver problems, high blood pressure, and other health problems. Family health If you have a family, there are many things you can do together to improve your health. · Eat meals together as a family as often as possible. · Eat healthy foods. This includes fruits, vegetables, lean meats and dairy, and whole grains. · Include your family in your fitness plan. Most people think of activities such as jogging or tennis as the way to fitness, but there are many ways you and your family can be more active. Anything that makes you breathe hard and gets your heart pumping is exercise. Here are some tips: 
¨ Walk to do errands or to take your child to school or the bus. ¨ Go for a family bike ride after dinner instead of watching TV. Where can you learn more? Go to http://shefali-kayley.info/. Enter J369 in the search box to learn more about \"A Healthy Lifestyle: Care Instructions. \" Current as of: May 12, 2017 Content Version: 11.4 © 8568-5223 [employer]. Care instructions adapted under license by [employer] ([employer]) (which disclaims liability or warranty for this information). If you have questions about a medical condition or this instruction, always ask your healthcare professional. [address] any warranty or liability for your use of this information. Introducing [facility]! Alesha Pineda introduces [employer] patient portal. Now you can access parts of your medical record, email your doctor's office, and request medication refills online. 1. In your internet browser, go to [email or web address] 2. Click on the First Time User? Click Here link in the Sign In box. You will see the New Member Sign Up page. 3. Enter your [employer] Access Code exactly as it appears below. You will not need to use this code after youve completed the sign-up process.  If you do not sign up before the expiration date, you must request a new code. · [employer] Access Code: [email or web address] Expires: 5/27/2018  2:37 PM 
 
4. Enter the last four digits of your Social Security Number (xxxx) and Date of Birth (mm/dd/yyyy) as indicated and click Submit. You will be taken to the next sign-up page. 5. Create a [employer] ID. This will be your [employer] login ID and cannot be changed, so think of one that is secure and easy to remember. 6. Create a [employer] password. You can change your password at any time. 7. Enter your Password Reset Question and Answer. This can be used at a later time if you forget your password. 8. Enter your e-mail address. You will receive e-mail notification when new information is available in [address] 9. Click Sign Up. You can now view and download portions of your medical record. 10. Click the Download Summary menu link to download a portable copy of your medical information. If you have questions, please visit the Frequently Asked Questions section of the [employer] website. Remember, [employer] is NOT to be used for urgent needs. For medical emergencies, dial 911. Now available from your iPhone and Android! Please provide this summary of care documentation to your next provider. Your primary care clinician is listed as Te Thrasher. If you have any questions after today's visit, please call [phone].

## 2018-03-13 NOTE — LETTER
3/13/2018 2:19 PM 
 
Patient:  Soledad Cameron YOB: 1950 Date of Visit: 3/13/2018 Dear Galina Beltre MD 
[address] [place] [address] 203 [facility] 7 [phone] VIA In Basket 
 : 
 
 
Ms. Soledad Cameron came back for follow-up today. Below are the relevant portions of my assessment and plan of care. If you have questions, please do not hesitate to call me. I look forward to following Ms. Dara Wang along with you. Sincerely, Galdino Walters MD

## 2018-03-13 NOTE — PATIENT INSTRUCTIONS

## 2018-03-13 NOTE — PROGRESS NOTES
Patient is here for follow up for memory loss  Patient was very combative and in psych [facility] for a week   Want to rule out dementia

## 2018-03-14 NOTE — TELEPHONE ENCOUNTER
Per MD:She has tried Aricept as well and that also caused issues.  I would recommend holding off on any medications for now. Spoke with lenny and told her of the above. She verbalized understanding.

## 2018-03-19 ENCOUNTER — TELEPHONE (OUTPATIENT)
Dept: NEUROLOGY | Age: 68
End: 2018-03-19

## 2018-03-19 DIAGNOSIS — F03.91 DEMENTIA WITH BEHAVIORAL DISTURBANCE, UNSPECIFIED DEMENTIA TYPE: ICD-10-CM

## 2018-03-19 RX ORDER — MEMANTINE HYDROCHLORIDE 5 MG-10 MG
KIT ORAL
Qty: 30 TAB | Refills: 0 | Status: SHIPPED | OUTPATIENT
Start: 2018-03-19 | End: 2018-06-13

## 2018-03-19 NOTE — TELEPHONE ENCOUNTER
[facility] needing a letter for patient to discontinue the memantine. Stated it was discussed with the nurse in a previous conversation but is needing it in writing or she will have to continue giving pt the medication.    Fax: [phone]

## 2018-03-26 ENCOUNTER — PATIENT OUTREACH (OUTPATIENT)
Dept: INTERNAL MEDICINE CLINIC | Age: 68
End: 2018-03-26

## 2018-03-26 NOTE — PROGRESS NOTES
Patient has graduated from the Transitions of Care Coordination  program on 3/26/18. Patient's symptoms are stable at this time. Patient/family has the ability to self-manage. Care management goals have been completed at this time. No further nurse navigator follow up scheduled. Pt has been attending psych appts routinely. No issues voiced by Astrid Maya, with pt's group home. Pt has nurse navigator's contact information for any further questions, concerns, or needs.     Patients upcoming visits:  Future Appointments  Date Time Provider Christian Durand   6/13/2018 9:45 AM Adrienne Clark MD [facility] ADITI SCHED   6/14/2018 11:00 AM MD TRAVIS Billings SCHED   8/9/2018 10:45 AM Adrienne Clark MD [id number] [facility]

## 2018-05-07 ENCOUNTER — TELEPHONE (OUTPATIENT)
Dept: INTERNAL MEDICINE CLINIC | Age: 68
End: 2018-05-07

## 2018-05-08 NOTE — TELEPHONE ENCOUNTER
Verified patient identity with two identifiers. Spoke with Nancy at [facility] asking if Dr. Eleanor Aguilar will write D/C for lidocaine liquid that was originally prescribed for sore throat by Patient Christina Holcomb has not been able to get in touch with anyone at [facility] to assist with this. Also requests written D/C for Robitussin DM. Will forward to Dr. Eleanor Aguilar and fax over if okay.

## 2018-05-09 NOTE — TELEPHONE ENCOUNTER
Esther Grover:  Discontinue Lidocaine   Change Robitussin DM to as needed for cough per directions on bottle  Phoned orders to [facility]  Fax orders to [phone]

## 2018-06-13 ENCOUNTER — OFFICE VISIT (OUTPATIENT)
Dept: INTERNAL MEDICINE CLINIC | Age: 68
End: 2018-06-13

## 2018-06-13 VITALS
TEMPERATURE: 97.7 F | RESPIRATION RATE: 16 BRPM | DIASTOLIC BLOOD PRESSURE: 80 MMHG | OXYGEN SATURATION: 99 % | HEART RATE: 73 BPM | HEIGHT: 59 IN | WEIGHT: 164 LBS | SYSTOLIC BLOOD PRESSURE: 120 MMHG | BODY MASS INDEX: 33.06 KG/M2

## 2018-06-13 DIAGNOSIS — K59.00 CONSTIPATION, UNSPECIFIED CONSTIPATION TYPE: ICD-10-CM

## 2018-06-13 DIAGNOSIS — M81.0 AGE-RELATED OSTEOPOROSIS WITHOUT CURRENT PATHOLOGICAL FRACTURE: ICD-10-CM

## 2018-06-13 DIAGNOSIS — Z00.00 MEDICARE ANNUAL WELLNESS VISIT, SUBSEQUENT: ICD-10-CM

## 2018-06-13 DIAGNOSIS — D69.6 THROMBOCYTOPENIA (HCC): ICD-10-CM

## 2018-06-13 DIAGNOSIS — Z13.39 SCREENING FOR ALCOHOLISM: ICD-10-CM

## 2018-06-13 DIAGNOSIS — E78.00 HYPERCHOLESTEREMIA: Primary | ICD-10-CM

## 2018-06-13 DIAGNOSIS — Z13.31 SCREENING FOR DEPRESSION: ICD-10-CM

## 2018-06-13 NOTE — PROGRESS NOTES
Chief Complaint   Patient presents with    Hypertension    Diabetes     Patient is here for her 6 month follow up with caregiver.   Patient's caregiver advised patient is due for eye exam.

## 2018-06-13 NOTE — PROGRESS NOTES
HPI:  Kan Maier is a 75y.o. year old female with developmental delay who returns to clinic today for follow up: Osteoporosis, GERD, constipation, dementia. She is accompanied by her . She has overall been doing much better. She continues to be followed by psychiatry for behavioral issues which have been much improved. She is also been followed by neurology and has been diagnosed with dementia. GERD: Controlled with as needed Nexium. Osteoporosis: No recent falls and has been doing well on Fosamax. Reviewed most recent bone density. She is due for repeat bone density. Constipation: Controlled well with diet and MiraLAX. Current Outpatient Prescriptions   Medication Sig Dispense Refill    hydrOXYzine HCl (ATARAX) 25 mg tablet Take  by mouth three (3) times daily as needed for Itching.  calcium carbonate-vitamin d2 500 mg(1,250mg) -200 unit tab Take  by mouth.  esomeprazole (NEXIUM) 40 mg capsule Take  by mouth daily.  docusate sodium (COLACE) 100 mg capsule Take 100 mg by mouth two (2) times a day.  QUEtiapine (SEROQUEL) 50 mg tablet Take 1 Tab by mouth three (3) times daily. Indications: BIPOLAR DISORDER IN REMISSION 90 Tab 0    FLUoxetine (PROZAC) 20 mg capsule Take 3 Caps by mouth daily. Indications: ANXIETY WITH DEPRESSION 90 Cap 0    busPIRone (BUSPAR) 10 mg tablet Take 2 Tabs by mouth three (3) times daily. Indications: Generalized Anxiety Disorder 90 Tab 0    alendronate (FOSAMAX) 70 mg tablet TAKE ONE TABLET WEEKLY ON FRIDAY 30 MINUTES BEFORE 1ST MEAL WITH FULLGLASS OF WATER. DO NOT LIE DOWN FOR 30 MINUTES AFTER TAKING 4 Tab 5    cyanocobalamin (VITAMIN B-12) 1,000 mcg tablet TAKE 1 TABLET BY MOUTH DAILY 30 Tab PRN    calcium carbonate (CALTREX) 600 mg calcium (1,500 mg) tablet TAKE 1 TABLET BY MOUTH TWICE A DAY 62 Tab PRN    inulin (FIBER CHOICE, INULIN,) 1.5 gram chew Take 1 Tab by mouth two (2) times a day.  60 Tab 5    polyethylene glycol (MIRALAX) 17 gram/dose powder MIX 1 CAPFUL (=17GM) IN A GLASS OF WATER & DRINK ONCE DAILY AS NEEDEDFOR CONSTIPATION 527 g 0     No Known Allergies  Social History   Substance Use Topics    Smoking status: Never Smoker    Smokeless tobacco: Never Used    Alcohol use No         Review of Systems   Constitutional: Negative for malaise/fatigue. Respiratory: Negative for shortness of breath. Cardiovascular: Negative for chest pain and leg swelling. Gastrointestinal: Negative for abdominal pain and heartburn. Neurological: Negative for dizziness and headaches. Physical Exam   Constitutional: She appears well-developed. No distress. /80 (BP 1 Location: Left arm, BP Patient Position: Sitting)  Pulse 73  Temp 97.7 °F (36.5 °C) (Oral)   Resp 16  Ht 4' 11\" (1.499 m)  Wt 164 lb (74.4 kg)  SpO2 99%  BMI 33.12 kg/m2   HENT:   Head: Normocephalic. Mouth/Throat: Oropharynx is clear and moist.   Eyes: Conjunctivae and EOM are normal. Pupils are equal, round, and reactive to light. Neck: Carotid bruit is not present. No thyromegaly present. Cardiovascular: Normal rate, regular rhythm, normal heart sounds and intact distal pulses. No murmur heard. Pulmonary/Chest: Effort normal and breath sounds normal. She has no wheezes. Abdominal: Soft. Bowel sounds are normal. She exhibits no distension and no mass. There is no tenderness. Musculoskeletal: She exhibits no edema. Lymphadenopathy:     She has no cervical adenopathy. Psychiatric: She has a normal mood and affect. Vitals reviewed. Assessment & Plan:    ICD-10-CM ICD-9-CM    1. Hypercholesteremia  Counseled to continue to work on weight loss and she has been doing a good job with this. Continue to follow low-fat low-cholesterol diet. Counseled regarding elevated BMI and current weight goals. Reviewed weight loss strategies including dietary changes and exercise. E78.00 272.0    2.  Age-related osteoporosis without current pathological fracture  Continue Fosamax calcium, vitamin D, exercise. M81.0 733.01 VITAMIN D, 25 HYDROXY      DEXA BONE DENSITY STUDY AXIAL   3. Constipation, unspecified constipation type  MiraLAX and diet high in fruit fiber. K59.00 564.00    4. Medicare annual wellness visit, subsequent  Health maintenance reviewed and updated with patient today at visit. Z00.00 V70.0    5. Screening for alcoholism Z13.89 V79.1 [place] ANNUAL ALCOHOL SCREEN 15 MIN   6. Screening for depression Z13.89 V79.0 DEPRESSION SCREEN ANNUAL   7. Thrombocytopenia ([address]  Elevated on recent inpatient admission although her previous CBC was normal in December. We will recheck a CBC. D69.6 287.5 CBC WITH AUTOMATED DIFF      Orders Placed This Encounter    Depression Screen Annual    DEXA BONE DENSITY STUDY AXIAL    CBC WITH AUTOMATED DIFF    VITAMIN D, 25 HYDROXY    Annual  Alcohol Screen 15 min ()        Follow-up Disposition:  Return in about 6 months (around 12/13/2018). Advised her to call back or return to office if symptoms worsen/change/persist.  Discussed expected course/resolution/complications of diagnosis in detail with patient. Medication risks/benefits/costs/interactions/alternatives discussed with patient. She was given an after visit summary which includes diagnoses, current medications, & vitals. She expressed understanding with the diagnosis and plan. This is also the Subsequent Medicare Annual Wellness Exam, performed 12 months or more after the Initial AWV or the last Subsequent AWV    I have reviewed the patient's medical history in detail and updated the computerized patient record.      History     Past Medical History:   Diagnosis Date    Allergic rhinitis, cause unspecified 7/15/2010    Anxiety     Decreased bone density 07/24/09    Dexa Scan     Detached retina     Diverticulosis     Falls     GERD (gastroesophageal reflux disease)     HTN (hypertension)     Memory disorder     Menorrhagia     Mental retardation     Muscle weakness     Osteopenia     Other and unspecified hyperlipidemia     S/P colonoscopy 03/01/08    Shortness of breath     Snoring     Thrombocytopenia (HCC)       Past Surgical History:   Procedure Laterality Date    COLONOSCOPY  3-5-2008    HX CATARACT REMOVAL      HX OVARIAN CYST REMOVAL      HX POLYPECTOMY      ablation     Current Outpatient Prescriptions   Medication Sig Dispense Refill    hydrOXYzine HCl (ATARAX) 25 mg tablet Take  by mouth three (3) times daily as needed for Itching.  calcium carbonate-vitamin d2 500 mg(1,250mg) -200 unit tab Take  by mouth.  esomeprazole (NEXIUM) 40 mg capsule Take  by mouth daily.  docusate sodium (COLACE) 100 mg capsule Take 100 mg by mouth two (2) times a day.  QUEtiapine (SEROQUEL) 50 mg tablet Take 1 Tab by mouth three (3) times daily. Indications: BIPOLAR DISORDER IN REMISSION 90 Tab 0    FLUoxetine (PROZAC) 20 mg capsule Take 3 Caps by mouth daily. Indications: ANXIETY WITH DEPRESSION 90 Cap 0    busPIRone (BUSPAR) 10 mg tablet Take 2 Tabs by mouth three (3) times daily. Indications: Generalized Anxiety Disorder 90 Tab 0    alendronate (FOSAMAX) 70 mg tablet TAKE ONE TABLET WEEKLY ON FRIDAY 30 MINUTES BEFORE 1ST MEAL WITH FULLGLASS OF WATER. DO NOT LIE DOWN FOR 30 MINUTES AFTER TAKING 4 Tab 5    cyanocobalamin (VITAMIN B-12) 1,000 mcg tablet TAKE 1 TABLET BY MOUTH DAILY 30 Tab PRN    calcium carbonate (CALTREX) 600 mg calcium (1,500 mg) tablet TAKE 1 TABLET BY MOUTH TWICE A DAY 62 Tab PRN    inulin (FIBER CHOICE, INULIN,) 1.5 gram chew Take 1 Tab by mouth two (2) times a day. 60 Tab 5    polyethylene glycol (MIRALAX) 17 gram/dose powder MIX 1 CAPFUL (=17GM) IN A GLASS OF WATER & DRINK ONCE DAILY AS NEEDEDFOR CONSTIPATION 527 g 0    dextromethorphan-guaiFENesin (ROBITUSSIN-DM)  mg/5 mL syrup Take 10 mL by mouth every six (6) hours as needed for Cough.  240 mL 0    VITAMIN D2 50,000 unit capsule TAKE 1 CAPSULE ONCE A WEEK ON FRIDAY. 4 Cap 11    memantine 5-10 mg DsPk Please discontinue Memantine 30 Tab 0    loratadine (CLARITIN) 10 mg tablet TAKE 1 TABLET BY MOUTH DAILY FOR ALLERGIES 31 Tab PRN     No Known Allergies  Family History   Problem Relation Age of Onset    Diabetes Father     Heart Disease Father      Social History   Substance Use Topics    Smoking status: Never Smoker    Smokeless tobacco: Never Used    Alcohol use No     Patient Active Problem List   Diagnosis Code    Allergic rhinitis J30.9    Hypercholesteremia E78.00    Depression F32.9    GERD (gastroesophageal reflux disease) K21.9    Tremor R25.1    Abnormal involuntary movements R25.9    Developmental delay R62.50    Osteoporosis M81.0    Advanced care planning/counseling discussion Z70.80    Dementia with behavioral disturbance F03.91    Bilateral primary osteoarthritis of knee M17.0    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 Functional Ability and Level of Safety:   Hearing Loss  Hearing is good. Activities of Daily Living  The home contains: no safety equipment. Patient needs help with:  transportation, shopping, preparing meals, managing medications and managing money    Fall Risk  Fall Risk Assessment, last 12 mths 12/20/2017   Able to walk? Yes   Fall in past 12 months?  Yes   Number of falls in past 12 months 1       Abuse Screen  Patient is not abused    Cognitive Screening   Evaluation of Cognitive Function:  Has your family/caregiver stated any concerns about your memory: yes  Has dementia    Patient Care Team   Patient Care Team:  Kimberly Botello MD as PCP - One [facility] Amy Bruce RN as Ambulatory Care Navigator (Internal Medicine)  Tree Melvin MD as Physician (Ophthalmology)  Richard Hubbard DPM as Physician ([place])  Juan Manuel Bolton MD (Neurology)  Bard Diana MD (Psychiatry)    Assessment/Plan   Education and counseling provided:  Are appropriate based on today's review and evaluation      Health Maintenance Due   Topic Date Due    GLAUCOMA SCREENING Q2Y  05/06/2017

## 2018-06-13 NOTE — MR AVS SNAPSHOT
[address] [facility] [address] [facility] 13 
[phone] Patient: Lulu Pallas MRN:  [id number] Visit Information Date & Time Provider Department Dept. Phone Encounter #  
 6/13/2018  9:45 AM Alec Fitch, [facility] [phone] [id number] Follow-up Instructions Return in about 6 months (around 12/13/2018). Your Appointments 6/14/2018 11:00 AM  
Follow Up with Asiya Briones MD  
[facility] [address] [facility]) Appt Note: 6 month follow up KRU 10/30; 3 month follow up KRU 3/13  
 [address]  [place] [address] [facility] [address] [facility],[address] [address]  
  
    
 8/9/2018 10:45 AM  
Medicare Physical with Alec Fitch MD  
[facility] Internal Medicine [address]) Appt Note: [address] [facility] [address] [place] [address] [facility] [address]  
[address] [place] [address] 13 Upcoming Health Maintenance Date Due  
 GLAUCOMA SCREENING Q2Y 5/6/2017 Influenza Age 5 to Adult 8/1/2018 BREAST CANCER SCRN MAMMOGRAM 4/24/2019 MEDICARE YEARLY EXAM 6/14/2019 DTaP/Tdap/Td series (2 - Td) 7/14/2019 COLONOSCOPY 7/6/2027 Allergies as of 6/13/2018  Review Complete On: 6/13/2018 By: Alec Fitch MD  
 No Known Allergies Current Immunizations  Reviewed on 2/21/2018 Name Date Influenza High Dose Vaccine PF 8/31/2015 Influenza Vaccine 10/27/2014, 10/16/2013 Influenza Vaccine Split 10/1/2011 Influenza Vaccine Whole 1/1/2005 PPD 6/23/2008 Pneumococcal Conjugate (PCV-13) 8/31/2015 Pneumococcal Polysaccharide (PPSV-23) 11/8/2016 TB Skin Test (PPD) Intradermal 8/28/2015 TD Vaccine 1/1/1999 TDAP Vaccine 7/14/2009 Zoster 11/12/2012 Zoster Recombinant 4/21/2018 Not reviewed this visit You Were Diagnosed With   
  
 Codes Comments Hypercholesteremia    -  Primary ICD-10-CM: E78.00 ICD-9-CM: 272.0 Age-related osteoporosis without current pathological fracture     ICD-10-CM: M81.0 ICD-9-CM: 733.01 Constipation, unspecified constipation type     ICD-10-CM: K59.00 ICD-9-CM: 564.00 Medicare annual wellness visit, subsequent     ICD-10-CM: Z00.00 ICD-9-CM: V70.0 Screening for alcoholism     ICD-10-CM: Z13.89 ICD-9-CM: V79.1 Screening for depression     ICD-10-CM: Z13.89 ICD-9-CM: V79.0 Thrombocytopenia ([address]     ICD-10-CM: D69.6 ICD-9-CM: 287.5 Vitals BP Pulse Temp Resp Height(growth percentile) Weight(growth percentile) 120/80 (BP 1 Location: Left arm, BP Patient Position: Sitting) 73 97.7 °F (36.5 °C) (Oral) 16 4' 11\" (1.499 m) 164 lb (74.4 kg) SpO2 BMI OB Status Smoking Status 99% 33.12 kg/m2 Postmenopausal Never Smoker BMI and BSA Data Body Mass Index Body Surface Area  
 33.12 kg/m 2 1.76 m 2 Preferred Pharmacy Pharmacy Name Phone [facility] [phone] [phone] Your Updated Medication List  
  
   
This list is accurate as of 6/13/18 10:39 AM.  Always use your most recent med list.  
  
  
  
  
 alendronate 70 mg tablet Commonly known as:  FOSAMAX TAKE ONE TABLET WEEKLY ON FRIDAY 30 MINUTES BEFORE 1ST MEAL WITH FULLGLASS OF WATER. DO NOT LIE DOWN FOR 30 MINUTES AFTER TAKING  
  
 busPIRone 10 mg tablet Commonly known as:  BUSPAR Take 2 Tabs by mouth three (3) times daily. Indications: Generalized Anxiety Disorder  
  
 calcium carbonate 600 mg calcium (1,500 mg) tablet Commonly known as:  CALTREX  
TAKE 1 TABLET BY MOUTH TWICE A DAY  
  
 calcium carbonate-vitamin d2 500 mg(1,250mg) -200 unit Tab Take  by mouth.  
  
 cyanocobalamin 1,000 mcg tablet Commonly known as:  VITAMIN B-12 TAKE 1 TABLET BY MOUTH DAILY dextromethorphan-guaiFENesin  mg/5 mL syrup Commonly known as:  ROBITUSSIN-DM Take 10 mL by mouth every six (6) hours as needed for Cough. docusate sodium 100 mg capsule Commonly known as:  Martinez Jimenez Take 100 mg by mouth two (2) times a day. FLUoxetine 20 mg capsule Commonly known as:  PROzac Take 3 Caps by mouth daily. Indications: ANXIETY WITH DEPRESSION  
  
 hydrOXYzine HCl 25 mg tablet Commonly known as:  ATARAX Take  by mouth three (3) times daily as needed for Itching. inulin 1.5 gram Lang Steven Commonly known as:  FIBER CHOICE (INULIN) Take 1 Tab by mouth two (2) times a day. loratadine 10 mg tablet Commonly known as:  CLARITIN  
TAKE 1 TABLET BY MOUTH DAILY FOR ALLERGIES  
  
 memantine 5-10 mg Dspk Please discontinue Memantine NexIUM 40 mg capsule Generic drug:  esomeprazole Take  by mouth daily. polyethylene glycol 17 gram/dose powder Commonly known as:  Lawerance Amas MIX 1 CAPFUL (=17GM) IN A GLASS OF WATER & DRINK ONCE DAILY AS NEEDEDFOR CONSTIPATION  
  
 QUEtiapine 50 mg tablet Commonly known as:  SEROquel Take 1 Tab by mouth three (3) times daily. Indications: BIPOLAR DISORDER IN REMISSION  
  
 VITAMIN D2 50,000 unit capsule Generic drug:  ergocalciferol TAKE 1 CAPSULE ONCE A WEEK ON FRIDAY. We Performed the Following CBC WITH AUTOMATED DIFF [07109 CPT(R)] [facility] [[id number] HCPCS] [place] ANNUAL ALCOHOL SCREEN 15 MIN [id number] [facility]] VITAMIN D, 25 HYDROXY [id number] CPT(R)] Follow-up Instructions Return in about 6 months (around 12/13/2018). To-Do List   
 06/13/2018 Imaging:  DEXA BONE DENSITY STUDY AXIAL Patient Instructions As discussed in your appointment today, [address] is an important part of planning for your healthcare future.  Discussing your preferences with your family and your care team is a part of good healthcare so that we can be guided by your known values and goals. Our office offers this service at no cost to you. Our Nurse Navigators and certified [employer] ® Facilitators, Chencho Norton and Asia Joseph typically schedule family appointments for this service on Wednesdays. To schedule an Advance Care Planning visit or to receive more information about this service, please call [facility] at [phone] and ask to speak directly to Nona Sanches or Alaina Zamorano Medicare Wellness Visit, Female The best way to live healthy is to have a lifestyle where you eat a well-balanced diet, exercise regularly, limit alcohol use, and quit all forms of tobacco/nicotine, if applicable. Regular preventive services are another way to keep healthy. Preventive services (vaccines, screening tests, monitoring & exams) can help personalize your care plan, which helps you manage your own care. Screening tests can find health problems at the earliest stages, when they are easiest to treat. [facility] follows the current, evidence-based guidelines published by the [facility] States Uche Reilly ([facility]) when recommending preventive services for our patients. Because we follow these guidelines, sometimes recommendations change over time as research supports it. (For example, mammograms used to be recommended annually. Even though Medicare will still pay for an annual mammogram, the newer guidelines recommend a mammogram every two years for women of average risk.) Of course, you and your provider may decide to screen more often for some diseases, based on your risk and co-morbidities (chronic disease you are already diagnosed with). Preventive services for you include: - Medicare offers their members a free annual wellness visit, which is time for you and your primary care provider to discuss and plan for your preventive service needs. Take advantage of this benefit every year! 
 
-All people over age 72 should receive the recommended pneumonia vaccines. Current USPSTF guidelines recommend a series of two vaccines for the best pneumonia protection.  
 
-All adults should have a yearly flu vaccine and a tetanus vaccine every 10 years. All adults age 61 years should receive a shingles vaccine once in their lifetime.   
 
-A bone mass density test is recommended when a woman turns 65 to screen for osteoporosis. This test is only recommended once as a screening. Some providers will use this same test as a disease monitoring tool if you already have osteoporosis. -All adults age 38-68 years who are overweight should have a diabetes screening test once every three years.  
 
-Other screening tests & preventive services for persons with diabetes include: an eye exam to screen for diabetic retinopathy, a kidney function test, a foot exam, and stricter control over your cholesterol.  
 
-Cardiovascular screening for adults with routine risk involves an electrocardiogram (ECG) at intervals determined by the provider.  
 
-Colorectal cancer screenings should be done for adults age 54-65 years with normal risk. There are a number of acceptable methods of screening for this type of cancer. Each test has its own benefits and drawbacks. Discuss with your provider what is most appropriate for you during your annual wellness visit. The different tests include: colonoscopy (considered the best screening method), a fecal occult blood test, a fecal DNA test, and sigmoidoscopy. -Breast cancer screenings are recommended every other year for women of normal risk age 54-69 years.  
 
-Cervical cancer screenings for women over age 72 are only recommended with certain risk factors.  
 
-All adults born between [facility] should be screened once for Hepatitis C.   
 
Here is a list of your current Health Maintenance items (your personalized list of preventive services) with a due date: 
Health Maintenance Due Topic Date Due  Glaucoma Screening   05/06/2017 Introducing [facility]! [facility] introduces [employer] patient portal. Now you can access parts of your medical record, email your doctor's office, and request medication refills online. 1. In your internet browser, go to [email or web address] [email or web address] 2. Click on the First Time User? Click Here link in the Sign In box. You will see the New Member Sign Up page. 3. Enter your [employer] Access Code exactly as it appears below. You will not need to use this code after youve completed the sign-up process. If you do not sign up before the expiration date, you must request a new code. · [employer] Access Code: [email or web address] Expires: 9/11/2018 10:39 AM 
 
4. Enter the last four digits of your Social Security Number (xxxx) and Date of Birth (mm/dd/yyyy) as indicated and click Submit. You will be taken to the next sign-up page. 5. Create a [employer] ID. This will be your [employer] login ID and cannot be changed, so think of one that is secure and easy to remember. 6. Create a [employer] password. You can change your password at any time. 7. Enter your Password Reset Question and Answer. This can be used at a later time if you forget your password. 8. Enter your e-mail address. You will receive e-mail notification when new information is available in [address] 9. Click Sign Up. You can now view and download portions of your medical record. 10. Click the Download Summary menu link to download a portable copy of your medical information. If you have questions, please visit the Frequently Asked Questions section of the [employer] website. Remember, [employer] is NOT to be used for urgent needs. For medical emergencies, dial 911. Now available from your iPhone and Android! Please provide this summary of care documentation to your next provider. Your primary care clinician is listed as Manolo File. If you have any questions after today's visit, please call [phone].

## 2018-06-13 NOTE — PATIENT INSTRUCTIONS
As discussed in your appointment today, [address] is an important part of planning for your healthcare future. Discussing your preferences with your family and your care team is a part of good healthcare so that we can be guided by your known values and goals. Our office offers this service at no cost to you. Our Nurse Navigators and certified Respecting Choices ® Facilitators, Ray Morales and Edilberto Kohler typically schedule family appointments for this service on Wednesdays. To schedule an Advance Care Planning visit or to receive more information about this service, please call Via [facility] Internal Medicine at [phone] and ask to speak directly to [facility] or [facility] Wellness Visit, Female    The best way to live healthy is to have a lifestyle where you eat a well-balanced diet, exercise regularly, limit alcohol use, and quit all forms of tobacco/nicotine, if applicable. Regular preventive services are another way to keep healthy. Preventive services (vaccines, screening tests, monitoring & exams) can help personalize your care plan, which helps you manage your own care. Screening tests can find health problems at the earliest stages, when they are easiest to treat. [address] follows the current, evidence-based guidelines published by the [facility] States [place]che Reilly (USPSTF) when recommending preventive services for our patients. Because we follow these guidelines, sometimes recommendations change over time as research supports it. (For example, mammograms used to be recommended annually. Even though Medicare will still pay for an annual mammogram, the newer guidelines recommend a mammogram every two years for women of average risk.)    Of course, you and your provider may decide to screen more often for some diseases, based on your risk and co-morbidities (chronic disease you are already diagnosed with).      Preventive services for you include:    - Medicare offers their members a free annual wellness visit, which is time for you and your primary care provider to discuss and plan for your preventive service needs. Take advantage of this benefit every year!    -All people over age 72 should receive the recommended pneumonia vaccines. Current USPSTF guidelines recommend a series of two vaccines for the best pneumonia protection.     -All adults should have a yearly flu vaccine and a tetanus vaccine every 10 years. All adults age 61 years should receive a shingles vaccine once in their lifetime.      -A bone mass density test is recommended when a woman turns 65 to screen for osteoporosis. This test is only recommended once as a screening. Some providers will use this same test as a disease monitoring tool if you already have osteoporosis. -All adults age 38-68 years who are overweight should have a diabetes screening test once every three years.     -Other screening tests & preventive services for persons with diabetes include: an eye exam to screen for diabetic retinopathy, a kidney function test, a foot exam, and stricter control over your cholesterol.     -Cardiovascular screening for adults with routine risk involves an electrocardiogram (ECG) at intervals determined by the provider.     -Colorectal cancer screenings should be done for adults age 54-65 years with normal risk. There are a number of acceptable methods of screening for this type of cancer. Each test has its own benefits and drawbacks. Discuss with your provider what is most appropriate for you during your annual wellness visit. The different tests include: colonoscopy (considered the best screening method), a fecal occult blood test, a fecal DNA test, and sigmoidoscopy. -Breast cancer screenings are recommended every other year for women of normal risk age 54-69 years.     -Cervical cancer screenings for women over age 72 are only recommended with certain risk factors. -All adults born between [facility] should be screened once for Hepatitis C.      Here is a list of your current Health Maintenance items (your personalized list of preventive services) with a due date:  Health Maintenance Due   Topic Date Due    Glaucoma Screening   05/06/2017

## 2018-06-22 ENCOUNTER — OFFICE VISIT (OUTPATIENT)
Dept: NEUROLOGY | Age: 68
End: 2018-06-22

## 2018-06-22 VITALS
BODY MASS INDEX: 33.06 KG/M2 | HEART RATE: 72 BPM | SYSTOLIC BLOOD PRESSURE: 120 MMHG | HEIGHT: 59 IN | DIASTOLIC BLOOD PRESSURE: 80 MMHG | WEIGHT: 164 LBS | RESPIRATION RATE: 14 BRPM | OXYGEN SATURATION: 97 %

## 2018-06-22 DIAGNOSIS — F03.90 DEMENTIA WITHOUT BEHAVIORAL DISTURBANCE, UNSPECIFIED DEMENTIA TYPE: Primary | ICD-10-CM

## 2018-06-22 DIAGNOSIS — R62.50 DEVELOPMENTAL DELAY: ICD-10-CM

## 2018-06-22 RX ORDER — GALANTAMINE 4 MG/1
4 TABLET, FILM COATED ORAL 2 TIMES DAILY
Qty: 60 TAB | Refills: 1 | Status: SHIPPED | OUTPATIENT
Start: 2018-06-22 | End: 2018-08-09 | Stop reason: SDUPTHER

## 2018-06-22 NOTE — PATIENT INSTRUCTIONS

## 2018-06-22 NOTE — MR AVS SNAPSHOT
[address] 
[phone] Patient: Ammon Tejeda MRN:  [id number] Visit Information Date & Time Provider Department Dept. Phone Encounter #  
 6/22/2018  1:40 PM Gerardo Adame MD [facility] at [address] [id number] Follow-up Instructions Return in about 3 months (around 9/22/2018). Your Appointments 8/9/2018 10:45 AM  
Medicare Physical with Daniela Escobar MD  
[address] Internal Medicine [address]) Appt Note: [address] [facility] [address]  
[address] [facility]  
  
    
 12/13/2018 10:30 AM  
ROUTINE CARE with Daniela Escobar MD  
[address] Internal [facility] [address] [facility]) Appt Note: 6 mo fu  
 [address] [facility] [address]  
[address] [address] [place] [address] Upcoming Health Maintenance Date Due  
 GLAUCOMA SCREENING Q2Y 5/6/2017 Influenza Age 5 to Adult 8/1/2018 BREAST CANCER SCRN MAMMOGRAM 4/24/2019 MEDICARE YEARLY EXAM 6/14/2019 DTaP/Tdap/Td series (2 - Td) 7/14/2019 COLONOSCOPY 7/6/2027 Allergies as of 6/22/2018  Review Complete On: 6/22/2018 By: Gerardo Adame MD  
 No Known Allergies Current Immunizations  Reviewed on 2/21/2018 Name Date Influenza High Dose Vaccine PF 8/31/2015 Influenza Vaccine 10/27/2014, 10/16/2013 Influenza Vaccine Split 10/1/2011 Influenza Vaccine Whole 1/1/2005 PPD 6/23/2008 Pneumococcal Conjugate (PCV-13) 8/31/2015 Pneumococcal Polysaccharide (PPSV-23) 11/8/2016 TB Skin Test (PPD) Intradermal 8/28/2015 TD Vaccine 1/1/1999 TDAP Vaccine 7/14/2009 Zoster 11/12/2012 Zoster Recombinant 4/21/2018 Not reviewed this visit You Were Diagnosed With   
  
 Codes Comments Dementia without behavioral disturbance, unspecified dementia type    -  Primary ICD-10-CM: F03.90 ICD-9-CM: 294.20 Developmental delay     ICD-10-CM: R62.50 ICD-9-CM: 783.40 Vitals BP Pulse Resp Height(growth percentile) Weight(growth percentile) SpO2  
 120/80 72 14 4' 11\" (1.499 m) 164 lb (74.4 kg) 97% BMI OB Status Smoking Status 33.12 kg/m2 Postmenopausal Never Smoker Vitals History BMI and BSA Data Body Mass Index Body Surface Area  
 33.12 kg/m 2 1.76 m 2 Preferred Pharmacy Pharmacy Name Phone [facility] [address] [phone] Your Updated Medication List  
  
   
This list is accurate as of 6/22/18  2:00 PM.  Always use your most recent med list.  
  
  
  
  
 alendronate 70 mg tablet Commonly known as:  FOSAMAX TAKE ONE TABLET WEEKLY ON FRIDAY 30 MINUTES BEFORE 1ST MEAL WITH FULLGLASS OF WATER. DO NOT LIE DOWN FOR 30 MINUTES AFTER TAKING  
  
 busPIRone 10 mg tablet Commonly known as:  BUSPAR Take 2 Tabs by mouth three (3) times daily. Indications: Generalized Anxiety Disorder  
  
 calcium carbonate 600 mg calcium (1,500 mg) tablet Commonly known as:  CALTREX  
TAKE 1 TABLET BY MOUTH TWICE A DAY  
  
 cyanocobalamin 1,000 mcg tablet Commonly known as:  VITAMIN B-12 TAKE 1 TABLET BY MOUTH DAILY docusate sodium 100 mg capsule Commonly known as:  Danica Silk Take 100 mg by mouth two (2) times a day. esomeprazole 40 mg capsule Commonly known as:  NEXIUM  
TAKE (1) CAPSULE BY MOUTH DAILY FLUoxetine 20 mg capsule Commonly known as:  PROzac Take 3 Caps by mouth daily. Indications: ANXIETY WITH DEPRESSION  
  
 galantamine 4 mg tablet Commonly known as:  Julio Nilay Take 1 Tab by mouth two (2) times a day.  
  
 hydrOXYzine HCl 25 mg tablet Commonly known as:  ATARAX Take  by mouth three (3) times daily as needed for Itching. inulin 1.5 gram Lang Steven Commonly known as:  FIBER CHOICE (INULIN) Take 1 Tab by mouth two (2) times a day. loratadine 10 mg tablet Commonly known as:  CLARITIN  
TAKE 1 TABLET BY MOUTH DAILY FOR ALLERGIES  
  
 polyethylene glycol 17 gram/dose powder Commonly known as:  Lawerance Amas MIX 1 CAPFUL (=17GM) IN A GLASS OF WATER & DRINK ONCE DAILY AS NEEDEDFOR CONSTIPATION  
  
 QUEtiapine 50 mg tablet Commonly known as:  SEROquel Take 1 Tab by mouth three (3) times daily. Indications: BIPOLAR DISORDER IN REMISSION Prescriptions Printed Refills  
 galantamine (RAZADYNE) 4 mg tablet 1 Sig: Take 1 Tab by mouth two (2) times a day. Class: Print Route: Oral  
  
Follow-up Instructions Return in about 3 months (around 9/22/2018). To-Do List   
 06/27/2018 10:00 AM  
  Appointment with [facility] 1 at [address] [place] [address] ([phone]) Please, no calcium supplements or antacids that coat the stomach (ex: Tums, Mylanta) 24 hours prior to procedure. Maintain normal diet and medications. Dairy products are allowed. Wear an outfit with an elastic waistband (no zipper or metal snaps). Check in at registration 15min before your appointment time unless you were instructed to do otherwise. Patient Instructions A Healthy Lifestyle: Care Instructions Your Care Instructions A healthy lifestyle can help you feel good, stay at a healthy weight, and have plenty of energy for both work and play. A healthy lifestyle is something you can share with your whole family. A healthy lifestyle also can lower your risk for serious health problems, such as high blood pressure, heart disease, and diabetes. You can follow a few steps listed below to improve your health and the health of your family. Follow-up care is a key part of your treatment and safety.  Be sure to make and go to all appointments, and call your doctor if you are having problems. It's also a good idea to know your test results and keep a list of the medicines you take. How can you care for yourself at home? · Do not eat too much sugar, fat, or fast foods. You can still have dessert and treats now and then. The goal is moderation. · Start small to improve your eating habits. Pay attention to portion sizes, drink less juice and soda pop, and eat more fruits and vegetables. ¨ Eat a healthy amount of food. A 3-ounce serving of meat, for example, is about the size of a deck of cards. Fill the rest of your plate with vegetables and whole grains. ¨ Limit the amount of soda and sports drinks you have every day. Drink more water when you are thirsty. ¨ Eat at least 5 servings of fruits and vegetables every day. It may seem like a lot, but it is not hard to reach this goal. A serving or helping is 1 piece of fruit, 1 cup of vegetables, or 2 cups of leafy, raw vegetables. Have an apple or some carrot sticks as an afternoon snack instead of a candy bar. Try to have fruits and/or vegetables at every meal. 
· Make exercise part of your daily routine. You may want to start with simple activities, such as walking, bicycling, or slow swimming. Try to be active 30 to 60 minutes every day. You do not need to do all 30 to 60 minutes all at once. For example, you can exercise 3 times a day for 10 or 20 minutes. Moderate exercise is safe for most people, but it is always a good idea to talk to your doctor before starting an exercise program. 
· Keep moving. Matthew Voss the lawn, work in the garden, or cloudswave. Take the stairs instead of the elevator at work. · If you smoke, quit. People who smoke have an increased risk for heart attack, stroke, cancer, and other lung illnesses. Quitting is hard, but there are ways to boost your chance of quitting tobacco for good. ¨ Use nicotine gum, patches, or lozenges. ¨ Ask your doctor about stop-smoking programs and medicines. ¨ Keep trying. In addition to reducing your risk of diseases in the future, you will notice some benefits soon after you stop using tobacco. If you have shortness of breath or asthma symptoms, they will likely get better within a few weeks after you quit. · Limit how much alcohol you drink. Moderate amounts of alcohol (up to 2 drinks a day for men, 1 drink a day for women) are okay. But drinking too much can lead to liver problems, high blood pressure, and other health problems. Family health If you have a family, there are many things you can do together to improve your health. · Eat meals together as a family as often as possible. · Eat healthy foods. This includes fruits, vegetables, lean meats and dairy, and whole grains. · Include your family in your fitness plan. Most people think of activities such as jogging or tennis as the way to fitness, but there are many ways you and your family can be more active. Anything that makes you breathe hard and gets your heart pumping is exercise. Here are some tips: 
¨ Walk to do errands or to take your child to school or the bus. ¨ Go for a family bike ride after dinner instead of watching TV. Where can you learn more? Go to http://shefali-kayley.info/. Enter X596 in the search box to learn more about \"A Healthy Lifestyle: Care Instructions. \" Current as of: May 12, 2017 Content Version: 11.4 © 8693-6379 Healthwise, Incorporated. Care instructions adapted under license by [employer] (which disclaims liability or warranty for this information). If you have questions about a medical condition or this instruction, always ask your healthcare professional. [place] [address] any warranty or liability for your use of this information. Introducing [facility]! [facility] introduces [employer] patient portal. Now you can access parts of your medical record, email your doctor's office, and request medication refills online. 1. In your internet browser, go to [email or web address] 2. Click on the First Time User? Click Here link in the Sign In box. You will see the New Member Sign Up page. 3. Enter your [employer] Access Code exactly as it appears below. You will not need to use this code after youve completed the sign-up process. If you do not sign up before the expiration date, you must request a new code. · [employer] Access Code: [email or web address] Expires: 9/11/2018 10:39 AM 
 
4. Enter the last four digits of your Social Security Number (xxxx) and Date of Birth (mm/dd/yyyy) as indicated and click Submit. You will be taken to the next sign-up page. 5. Create a Belly Ballott ID. This will be your [employer] login ID and cannot be changed, so think of one that is secure and easy to remember. 6. Create a [employer] password. You can change your password at any time. 7. Enter your Password Reset Question and Answer. This can be used at a later time if you forget your password. 8. Enter your e-mail address. You will receive e-mail notification when new information is available in [address] 9. Click Sign Up. You can now view and download portions of your medical record. 10. Click the Download Summary menu link to download a portable copy of your medical information. If you have questions, please visit the Frequently Asked Questions section of the [employer] website. Remember, [employer] is NOT to be used for urgent needs. For medical emergencies, dial 911. Now available from your iPhone and Android! Please provide this summary of care documentation to your next provider. Your primary care clinician is listed as Jazmin Dodson. If you have any questions after today's visit, please call [phone].

## 2018-06-22 NOTE — PROGRESS NOTES
Chief Complaint   Patient presents with    Memory Loss         HISTORY OF PRESENT ILLNESS  Gallito Jaeger came back for follow-up. She came with her caregiver. She tells me that overall she has had more decline in her memory. She keeps asking the same question multiple times a day and is now less oriented in general to her surroundings then she has been in the past.    She was tried on memantine at last visit but this also caused some GI issues. She was tried on Aricept in the past which caused diarrhea and was discontinued. She has also had behavioral issues in the past and has been hospitalized in the inpatient psychiatric unit. She has dementia at baseline with baseline developmental delay/cognitive impairment. She needs assistance with most activities of daily living. She is living in a group home. She had oral dyskinesias which improved after she stopped Risperdal      Current Outpatient Prescriptions   Medication Sig    galantamine (RAZADYNE) 4 mg tablet Take 1 Tab by mouth two (2) times a day.  calcium carbonate (CALTREX) 600 mg calcium (1,500 mg) tablet TAKE 1 TABLET BY MOUTH TWICE A DAY    loratadine (CLARITIN) 10 mg tablet TAKE 1 TABLET BY MOUTH DAILY FOR ALLERGIES    esomeprazole (NEXIUM) 40 mg capsule TAKE (1) CAPSULE BY MOUTH DAILY    hydrOXYzine HCl (ATARAX) 25 mg tablet Take  by mouth three (3) times daily as needed for Itching.  docusate sodium (COLACE) 100 mg capsule Take 100 mg by mouth two (2) times a day.  QUEtiapine (SEROQUEL) 50 mg tablet Take 1 Tab by mouth three (3) times daily. Indications: BIPOLAR DISORDER IN REMISSION    FLUoxetine (PROZAC) 20 mg capsule Take 3 Caps by mouth daily. Indications: ANXIETY WITH DEPRESSION    busPIRone (BUSPAR) 10 mg tablet Take 2 Tabs by mouth three (3) times daily.  Indications: Generalized Anxiety Disorder    alendronate (FOSAMAX) 70 mg tablet TAKE ONE TABLET WEEKLY ON FRIDAY 30 MINUTES BEFORE 1ST MEAL WITH FULLGLASS OF WATER. DO NOT LIE DOWN FOR 30 MINUTES AFTER TAKING    cyanocobalamin (VITAMIN B-12) 1,000 mcg tablet TAKE 1 TABLET BY MOUTH DAILY    inulin (FIBER CHOICE, INULIN,) 1.5 gram chew Take 1 Tab by mouth two (2) times a day.  polyethylene glycol (MIRALAX) 17 gram/dose powder MIX 1 CAPFUL (=17GM) IN A GLASS OF WATER & DRINK ONCE DAILY AS NEEDEDFOR CONSTIPATION     No current facility-administered medications for this visit. PHYSICAL EXAMINATION:    Visit Vitals    /80    Pulse 72    Resp 14    Ht 4' 11\" (1.499 m)    Wt 74.4 kg (164 lb)    SpO2 97%    BMI 33.12 kg/m2       NEUROLOGICAL EXAMINATION:     Mental Status:   Alert and oriented to person, place. She does quite poorly on cognitive assessment and has difficulties with visuospatial, executive function, memory, abstraction and orientation. She could not tell me today's date, month or year. She remember what she ate for lunch but could not remember what cereal she had for breakfast.  She knew that she is in [facility] but could not tell me the name of this hospital. She could draw a [place] but had difficulty putting all the numbers that are on a clock. She knows her date of birth. Cranial Nerves:    II, III, IV, VI:  Visual acuity grossly intact. Visual fields are normal.    Pupils are equal, round, and reactive to light and accommodation. Extra-ocular movements are full and fluid. V-XII: Hearing is grossly intact. Facial features are symmetric, with normal sensation and strength. The palate rises symmetrically and the tongue protrudes midline. Sternocleidomastoids 5/5. Motor Examination: Normal tone, bulk, and strength. 5/5 muscle strength throughout. No cogwheel rigidity or clonus present. Sensory exam:  Normal throughout to pinprick, temperature, and vibration sense. Coordination:  Finger to nose and rapid arm movement testing was normal.   No resting or intention tremor    Gait and Station:  Steady.   Normal arm swing. No Rhomberg or pronator drift. No muscle wasting or fasiculations noted. Reflexes:  DTRs 2+ throughout. Toes downgoing. LABS / IMAGING    Lab Results   Component Value Date/Time    WBC 6.3 02/22/2018 06:01 AM    HGB 12.6 02/22/2018 06:01 AM    HCT 37.2 02/22/2018 06:01 AM    PLATELET 253 (L) 35/17/4460 06:01 AM    MCV 89.0 02/22/2018 06:01 AM     Lab Results   Component Value Date/Time    Sodium 137 02/22/2018 06:01 AM    Potassium 3.7 02/22/2018 06:01 AM    Chloride 105 02/22/2018 06:01 AM    CO2 21 02/22/2018 06:01 AM    Anion gap 11 02/22/2018 06:01 AM    Glucose 98 02/23/2018 06:45 AM    BUN 16 02/22/2018 06:01 AM    Creatinine 0.78 02/22/2018 06:01 AM    BUN/Creatinine ratio 21 (H) 02/22/2018 06:01 AM    GFR est AA >60 02/22/2018 06:01 AM    GFR est non-AA >60 02/22/2018 06:01 AM    Calcium 9.1 02/22/2018 06:01 AM    Bilirubin, total 0.8 02/22/2018 06:01 AM    AST (SGOT) 18 02/22/2018 06:01 AM    Alk. phosphatase 76 02/22/2018 06:01 AM    Protein, total 7.5 02/22/2018 06:01 AM    Albumin 3.5 02/22/2018 06:01 AM    Globulin 4.0 02/22/2018 06:01 AM    A-G Ratio 0.9 (L) 02/22/2018 06:01 AM    ALT (SGPT) 20 02/22/2018 06:01 AM         ASSESSMENT    ICD-10-CM ICD-9-CM    1. Dementia without behavioral disturbance, unspecified dementia type F03.90 294.20 galantamine (RAZADYNE) 4 mg tablet   2. Developmental delay R62.50 783.40 galantamine (RAZADYNE) 4 mg tablet       DISCUSSION  Ms. Modesto Flor has baseline cognitive impairment to a moderate degree related to developmental delay. She has superimposed neurodegenerative dementia and seems to be having a slow decline over time. She has not tolerated donepezil or memantine. We will try her on galantamine 4 mg twice a day and see if she can tolerate it.   She is currently living in a group home in a supervised situation  We will continue clinical monitoring       Shari Lea MD  [place] [address], [address] of Psychiatry & Neurology (Neurology)  Keith Rodriguez Board of Psychiatry & Neurology (Clinical Neurophysiology)  Diplomate, American Board of Electrodiagnostic Medicine

## 2018-06-27 ENCOUNTER — HOSPITAL ENCOUNTER (OUTPATIENT)
Dept: MAMMOGRAPHY | Age: 68
Discharge: HOME | End: 2018-06-27
Attending: INTERNAL MEDICINE

## 2018-06-27 DIAGNOSIS — M81.0 AGE-RELATED OSTEOPOROSIS WITHOUT CURRENT PATHOLOGICAL FRACTURE: ICD-10-CM

## 2018-07-02 ENCOUNTER — HOSPITAL ENCOUNTER (OUTPATIENT)
Dept: LAB | Age: 68
Discharge: HOME | End: 2018-07-02
Payer: MEDICARE

## 2018-07-02 PROCEDURE — 85025 COMPLETE CBC W/AUTO DIFF WBC: CPT

## 2018-07-02 PROCEDURE — 82306 VITAMIN D 25 HYDROXY: CPT

## 2018-07-02 PROCEDURE — 36415 COLL VENOUS BLD VENIPUNCTURE: CPT

## 2018-07-03 LAB
25(OH)D3+25(OH)D2 SERPL-MCNC: 27.1 NG/ML (ref 30–100)
BASOPHILS # BLD AUTO: 0 X10E3/UL (ref 0–0.2)
BASOPHILS NFR BLD AUTO: 0 %
EOSINOPHIL # BLD AUTO: 0.1 X10E3/UL (ref 0–0.4)
EOSINOPHIL NFR BLD AUTO: 2 %
ERYTHROCYTE [DISTWIDTH] IN BLOOD BY AUTOMATED COUNT: 14.9 % (ref 12.3–15.4)
HCT VFR BLD AUTO: 38.6 % (ref 34–46.6)
HGB BLD-MCNC: 12.3 G/DL (ref 11.1–15.9)
IMM GRANULOCYTES # BLD: 0 X10E3/UL (ref 0–0.1)
IMM GRANULOCYTES NFR BLD: 0 %
LYMPHOCYTES # BLD AUTO: 1.3 X10E3/UL (ref 0.7–3.1)
LYMPHOCYTES NFR BLD AUTO: 27 %
MCH RBC QN AUTO: 28.7 PG (ref 26.6–33)
MCHC RBC AUTO-ENTMCNC: 31.9 G/DL (ref 31.5–35.7)
MCV RBC AUTO: 90 FL (ref 79–97)
MONOCYTES # BLD AUTO: 0.4 X10E3/UL (ref 0.1–0.9)
MONOCYTES NFR BLD AUTO: 8 %
NEUTROPHILS # BLD AUTO: 2.9 X10E3/UL (ref 1.4–7)
NEUTROPHILS NFR BLD AUTO: 63 %
PLATELET # BLD AUTO: 149 X10E3/UL (ref 150–379)
RBC # BLD AUTO: 4.28 X10E6/UL (ref 3.77–5.28)
WBC # BLD AUTO: 4.6 X10E3/UL (ref 3.4–10.8)

## 2018-07-09 ENCOUNTER — TELEPHONE (OUTPATIENT)
Dept: INTERNAL MEDICINE CLINIC | Age: 68
End: 2018-07-09

## 2018-07-09 ENCOUNTER — TELEPHONE (OUTPATIENT)
Dept: NEUROLOGY | Age: 68
End: 2018-07-09

## 2018-07-09 NOTE — TELEPHONE ENCOUNTER
Called Rae back and notified her that Dr Isai Marques recommendation is to contact pt psychiatrist and inform him of pt behavioral issues and refusing to take medications.

## 2018-07-09 NOTE — TELEPHONE ENCOUNTER
Dariana Blunt from the [address] [phone]     Calling to let Dr. Yuko Ha know pt refused to take her meds this morning -- believes there are 8 that Dr. Yuko Ha prescribes.

## 2018-07-09 NOTE — TELEPHONE ENCOUNTER
Called and spoke with Caren Santiago and she states the pt will not even acknowledge anyone to take her medication.

## 2018-07-09 NOTE — TELEPHONE ENCOUNTER
May hold or skip her neurological medication for today i.e. Galantamine. If she does not get out of bed or does not improve/take medications, she should be checked out at an urgent care or ED. Relayed this information to Etienne Malave from [employer]. She voiced her understanding of the doctor's recommendation.

## 2018-08-20 ENCOUNTER — TELEPHONE (OUTPATIENT)
Dept: INTERNAL MEDICINE CLINIC | Age: 68
End: 2018-08-20

## 2018-08-20 NOTE — LETTER
8/21/2018 9:42 AM 
 
Ms. [address] [place] [phone] To whom it may concern, This is an order for Krissy Erickson to discontinue  Robitussin syrup PRN cough. If there are any questions please contact the office at [phone] Sincerely, Gokul Carpio MD

## 2018-08-20 NOTE — TELEPHONE ENCOUNTER
[address] [facility] - [phone]  [phone]  Need orders to discontinue her Robatuson syrup.   Advise

## 2018-09-27 ENCOUNTER — OFFICE VISIT (OUTPATIENT)
Dept: NEUROLOGY | Age: 68
End: 2018-09-27

## 2018-09-27 VITALS
BODY MASS INDEX: 32.46 KG/M2 | DIASTOLIC BLOOD PRESSURE: 70 MMHG | SYSTOLIC BLOOD PRESSURE: 120 MMHG | WEIGHT: 161 LBS | HEART RATE: 76 BPM | OXYGEN SATURATION: 96 % | HEIGHT: 59 IN

## 2018-09-27 DIAGNOSIS — F79 INTELLECTUAL DISABILITY: ICD-10-CM

## 2018-09-27 DIAGNOSIS — F03.90 DEMENTIA WITHOUT BEHAVIORAL DISTURBANCE, UNSPECIFIED DEMENTIA TYPE: Primary | ICD-10-CM

## 2018-09-27 NOTE — MR AVS SNAPSHOT
[address] 13 
[phone] Patient: Johnny Hagen MRN:  [id number] Visit Information Date & Time Provider Department Dept. Phone Encounter #  
 9/27/2018 10:40 AM Jo Marte MD [facility] at [address] [facility] [phone] Follow-up Instructions Return in about 6 months (around 3/27/2019). Your Appointments 12/13/2018 10:30 AM  
ROUTINE CARE with Lauren Nino MD  
[facility] [place] [address] Internal Medicine [address] [facility]) Appt Note: 6 mo fu  
 [address] [address] [facility] [address]  
[address] [address] [place] [address] Upcoming Health Maintenance Date Due  
 GLAUCOMA SCREENING Q2Y 5/6/2017 Shingrix Vaccine Age 50> (2 of 2) 6/16/2018 Influenza Age 5 to Adult 8/1/2018 BREAST CANCER SCRN MAMMOGRAM 4/24/2019 MEDICARE YEARLY EXAM 6/14/2019 DTaP/Tdap/Td series (2 - Td) 7/14/2019 COLONOSCOPY 7/6/2027 Allergies as of 9/27/2018  Review Complete On: 9/27/2018 By: Sridevi Morales No Known Allergies Current Immunizations  Reviewed on 2/21/2018 Name Date Influenza High Dose Vaccine PF 8/31/2015 Influenza Vaccine 10/27/2014, 10/16/2013 Influenza Vaccine Split 10/1/2011 Influenza Vaccine Whole 1/1/2005 PPD 6/23/2008 Pneumococcal Conjugate (PCV-13) 8/31/2015 Pneumococcal Polysaccharide (PPSV-23) 11/8/2016 TB Skin Test (PPD) Intradermal 8/28/2015 TD Vaccine 1/1/1999 TDAP Vaccine 7/14/2009 Zoster 11/12/2012 Zoster Recombinant 4/21/2018 Not reviewed this visit You Were Diagnosed With   
  
 Codes Comments Dementia without behavioral disturbance, unspecified dementia type    -  Primary ICD-10-CM: F03.90 ICD-9-CM: 294.20 Intellectual disability     ICD-10-CM: F79 
ICD-9-CM: 173 Vitals BP Pulse Height(growth percentile) Weight(growth percentile) SpO2 BMI  
 120/70 76 4' 11\" (1.499 m) 161 lb (73 kg) 96% 32.52 kg/m2 OB Status Smoking Status Postmenopausal Never Smoker Vitals History BMI and BSA Data Body Mass Index Body Surface Area 32.52 kg/m 2 1.74 m 2 Preferred Pharmacy Pharmacy Name Phone [facility], [place] [address] [phone] Your Updated Medication List  
  
   
This list is accurate as of 9/27/18 11:08 AM.  Always use your most recent med list.  
  
  
  
  
 alendronate 70 mg tablet Commonly known as:  FOSAMAX TAKE ONE TABLET WEEKLY ON FRIDAY 30 MINUTES BEFORE 1ST MEAL WITH FULLGLASS OF WATER. DO NOT LIE DOWN FOR 30 MINUTES AFTER TAKING  
  
 busPIRone 10 mg tablet Commonly known as:  BUSPAR Take 2 Tabs by mouth three (3) times daily. Indications: Generalized Anxiety Disorder  
  
 calcium carbonate 600 mg calcium (1,500 mg) tablet Commonly known as:  CALTREX  
TAKE 1 TABLET BY MOUTH TWICE A DAY  
  
 cyanocobalamin 1,000 mcg tablet Commonly known as:  VITAMIN B-12 TAKE 1 TABLET BY MOUTH DAILY docusate sodium 100 mg capsule Commonly known as:  Constance Lowers Take 100 mg by mouth two (2) times a day. esomeprazole 40 mg capsule Commonly known as:  NEXIUM  
TAKE (1) CAPSULE BY MOUTH DAILY FLUoxetine 20 mg capsule Commonly known as:  PROzac Take 3 Caps by mouth daily. Indications: ANXIETY WITH DEPRESSION  
  
 galantamine 4 mg tablet Commonly known as:  RAZADYNE  
TAKE 1 TABLET BY MOUTH TWICE A DAY  
  
 hydrOXYzine HCl 25 mg tablet Commonly known as:  ATARAX Take  by mouth three (3) times daily as needed for Itching. inulin 1.5 gram Toya Hillisa Commonly known as:  FIBER CHOICE (INULIN) Take 1 Tab by mouth two (2) times a day. loratadine 10 mg tablet Commonly known as:  CLARITIN  
TAKE 1 TABLET BY MOUTH DAILY FOR ALLERGIES  
  
 polyethylene glycol 17 gram/dose powder Commonly known as:  Mariia Floor MIX 1 CAPFUL (=17GM) IN A GLASS OF WATER & DRINK ONCE DAILY AS NEEDEDFOR CONSTIPATION  
  
 QUEtiapine 50 mg tablet Commonly known as:  SEROquel Take 1 Tab by mouth three (3) times daily. Indications: BIPOLAR DISORDER IN REMISSION Follow-up Instructions Return in about 6 months (around 3/27/2019). To-Do List   
 12/10/2018 10:00 AM  
  Appointment with [facility] 1 at [address] [place] [address] ([phone]) Please, no calcium supplements or antacids that coat the stomach (ex: Tums, Mylanta) 24 hours prior to procedure. Maintain normal diet and medications. Dairy products are allowed. Wear an outfit with an elastic waistband (no zipper or metal snaps). Check in at registration 15min before your appointment time unless you were instructed to do otherwise. Patient Instructions A Healthy Lifestyle: Care Instructions Your Care Instructions A healthy lifestyle can help you feel good, stay at a healthy weight, and have plenty of energy for both work and play. A healthy lifestyle is something you can share with your whole family. A healthy lifestyle also can lower your risk for serious health problems, such as high blood pressure, heart disease, and diabetes. You can follow a few steps listed below to improve your health and the health of your family. Follow-up care is a key part of your treatment and safety. Be sure to make and go to all appointments, and call your doctor if you are having problems. It's also a good idea to know your test results and keep a list of the medicines you take. How can you care for yourself at home? · Do not eat too much sugar, fat, or fast foods. You can still have dessert and treats now and then. The goal is moderation. · Start small to improve your eating habits.  Pay attention to portion sizes, drink less juice and soda pop, and eat more fruits and vegetables. ¨ Eat a healthy amount of food. A 3-ounce serving of meat, for example, is about the size of a deck of cards. Fill the rest of your plate with vegetables and whole grains. ¨ Limit the amount of soda and sports drinks you have every day. Drink more water when you are thirsty. ¨ Eat at least 5 servings of fruits and vegetables every day. It may seem like a lot, but it is not hard to reach this goal. A serving or helping is 1 piece of fruit, 1 cup of vegetables, or 2 cups of leafy, raw vegetables. Have an apple or some carrot sticks as an afternoon snack instead of a candy bar. Try to have fruits and/or vegetables at every meal. 
· Make exercise part of your daily routine. You may want to start with simple activities, such as walking, bicycling, or slow swimming. Try to be active 30 to 60 minutes every day. You do not need to do all 30 to 60 minutes all at once. For example, you can exercise 3 times a day for 10 or 20 minutes. Moderate exercise is safe for most people, but it is always a good idea to talk to your doctor before starting an exercise program. 
· Keep moving. Faoliver Bevels the lawn, work in the garden, or [employer]. Take the stairs instead of the elevator at work. · If you smoke, quit. People who smoke have an increased risk for heart attack, stroke, cancer, and other lung illnesses. Quitting is hard, but there are ways to boost your chance of quitting tobacco for good. ¨ Use nicotine gum, patches, or lozenges. ¨ Ask your doctor about stop-smoking programs and medicines. ¨ Keep trying. In addition to reducing your risk of diseases in the future, you will notice some benefits soon after you stop using tobacco. If you have shortness of breath or asthma symptoms, they will likely get better within a few weeks after you quit. · Limit how much alcohol you drink.  Moderate amounts of alcohol (up to 2 drinks a day for men, 1 drink a day for women) are okay. But drinking too much can lead to liver problems, high blood pressure, and other health problems. Family health If you have a family, there are many things you can do together to improve your health. · Eat meals together as a family as often as possible. · Eat healthy foods. This includes fruits, vegetables, lean meats and dairy, and whole grains. · Include your family in your fitness plan. Most people think of activities such as jogging or tennis as the way to fitness, but there are many ways you and your family can be more active. Anything that makes you breathe hard and gets your heart pumping is exercise. Here are some tips: 
¨ Walk to do errands or to take your child to school or the bus. ¨ Go for a family bike ride after dinner instead of watching TV. Where can you learn more? Go to http://shefali-kayley.info/. Enter Q816 in the search box to learn more about \"A Healthy Lifestyle: Care Instructions. \" Current as of: December 7, 2017 Content Version: 11.7 © 0018-0097 [employer]. Care instructions adapted under license by [employer] (which disclaims liability or warranty for this information). If you have questions about a medical condition or this instruction, always ask your healthcare professional. [address] any warranty or liability for your use of this information. Introducing \[facility] [place] [facility]\"" & HEALTH SERVICES! [facility] introduces [email or web address] patient portal. Now you can access parts of your medical record, email your doctor's office, and request medication refills online. 1. In your internet browser, go to [email or web address]. [email or web address] 2. Click on the First Time User? Click Here link in the Sign In box. You will see the New Member Sign Up page. 3. Enter your [email or web address] Access Code exactly as it appears below.  You will not need to use this code after youve completed the sign-up process. If you do not sign up before the expiration date, you must request a new code. · [employer] Access Code: [email or web address] Expires: 12/26/2018 10:48 AM 
 
4. Enter the last four digits of your Social Security Number (xxxx) and Date of Birth (mm/dd/yyyy) as indicated and click Submit. You will be taken to the next sign-up page. 5. Create a [employer] ID. This will be your [employer] login ID and cannot be changed, so think of one that is secure and easy to remember. 6. Create a [employer] password. You can change your password at any time. 7. Enter your Password Reset Question and Answer. This can be used at a later time if you forget your password. 8. Enter your e-mail address. You will receive e-mail notification when new information is available in [address] 9. Click Sign Up. You can now view and download portions of your medical record. 10. Click the Download Summary menu link to download a portable copy of your medical information. If you have questions, please visit the Frequently Asked Questions section of the [employer] website. Remember, [employer] is NOT to be used for urgent needs. For medical emergencies, dial 911. Now available from your iPhone and Android! Please provide this summary of care documentation to your next provider. Your primary care clinician is listed as Giovanna Castellon. If you have any questions after today's visit, please call [phone].

## 2018-09-27 NOTE — PATIENT INSTRUCTIONS

## 2018-09-27 NOTE — PROGRESS NOTES
Chief Complaint   Patient presents with    Memory Loss         HISTORY OF PRESENT ILLNESS  Jeny Navarro came back for follow-up. She came with her caregiver. No change since last seen. Memory and overall behavior has been stable. She has been diagnosed with neurodegenerative dementia superimposed on baseline intellectual disability. She keeps asking the same question multiple times a day and is now less oriented in general to her surroundings then she has been in the past.  She was tried on memantine but it also caused some GI issues. She was tried on Aricept in the past which caused diarrhea and was discontinued. She has also had behavioral issues in the past and has been hospitalized in the inpatient psychiatric unit. She needs assistance with most activities of daily living. She is living in a group home. She had oral dyskinesias which improved after she stopped Risperdal      Current Outpatient Prescriptions   Medication Sig    galantamine (RAZADYNE) 4 mg tablet TAKE 1 TABLET BY MOUTH TWICE A DAY    alendronate (FOSAMAX) 70 mg tablet TAKE ONE TABLET WEEKLY ON FRIDAY 30 MINUTES BEFORE 1ST MEAL WITH FULLGLASS OF WATER. DO NOT LIE DOWN FOR 30 MINUTES AFTER TAKING    calcium carbonate (CALTREX) 600 mg calcium (1,500 mg) tablet TAKE 1 TABLET BY MOUTH TWICE A DAY    loratadine (CLARITIN) 10 mg tablet TAKE 1 TABLET BY MOUTH DAILY FOR ALLERGIES    esomeprazole (NEXIUM) 40 mg capsule TAKE (1) CAPSULE BY MOUTH DAILY    hydrOXYzine HCl (ATARAX) 25 mg tablet Take  by mouth three (3) times daily as needed for Itching.  docusate sodium (COLACE) 100 mg capsule Take 100 mg by mouth two (2) times a day.  QUEtiapine (SEROQUEL) 50 mg tablet Take 1 Tab by mouth three (3) times daily. Indications: BIPOLAR DISORDER IN REMISSION    FLUoxetine (PROZAC) 20 mg capsule Take 3 Caps by mouth daily.  Indications: ANXIETY WITH DEPRESSION    busPIRone (BUSPAR) 10 mg tablet Take 2 Tabs by mouth three (3) times daily. Indications: Generalized Anxiety Disorder    cyanocobalamin (VITAMIN B-12) 1,000 mcg tablet TAKE 1 TABLET BY MOUTH DAILY    inulin (FIBER CHOICE, INULIN,) 1.5 gram chew Take 1 Tab by mouth two (2) times a day.  polyethylene glycol (MIRALAX) 17 gram/dose powder MIX 1 CAPFUL (=17GM) IN A GLASS OF WATER & DRINK ONCE DAILY AS NEEDEDFOR CONSTIPATION     No current facility-administered medications for this visit. PHYSICAL EXAMINATION:    Visit Vitals    /70    Pulse 76    Ht 4' 11\" (1.499 m)    Wt 73 kg (161 lb)    SpO2 96%    BMI 32.52 kg/m2       NEUROLOGICAL EXAMINATION:     Mental Status:   Alert and oriented to person, place. She does quite poorly on cognitive assessment and has difficulties with visuospatial, executive function, memory, abstraction and orientation. She could not tell me today's date, or year. She knew the month. She could not remember what she ate for breakfast.  She knew that she is in [place] and that this was [facility]. She could draw a [place] but had difficulty putting all the numbers that are on a clock. She could not tell me her date of birth but knew that she recently had her birthday. Cranial Nerves:    II, III, IV, VI:  Visual acuity grossly intact. Visual fields are normal.    Pupils are equal, round, and reactive to light and accommodation. Extra-ocular movements are full and fluid. V-XII: Hearing is grossly intact. Facial features are symmetric, with normal sensation and strength. The palate rises symmetrically and the tongue protrudes midline. Sternocleidomastoids 5/5. Motor Examination: Normal tone, bulk, and strength. 5/5 muscle strength throughout. No cogwheel rigidity or clonus present. Sensory exam:  Normal throughout to pinprick, temperature, and vibration sense. Coordination:  Finger to nose and rapid arm movement testing was normal.   No resting or intention tremor    Gait and Station:  Steady. Normal arm swing. No Rhomberg or pronator drift. No muscle wasting or fasiculations noted. Reflexes:  DTRs 2+ throughout. Toes downgoing. LABS / IMAGING    Lab Results   Component Value Date/Time    WBC 4.6 07/02/2018 10:54 AM    HGB 12.3 07/02/2018 10:54 AM    HCT 38.6 07/02/2018 10:54 AM    PLATELET 430 (L) 00/14/5450 10:54 AM    MCV 90 07/02/2018 10:54 AM     Lab Results   Component Value Date/Time    Sodium 137 02/22/2018 06:01 AM    Potassium 3.7 02/22/2018 06:01 AM    Chloride 105 02/22/2018 06:01 AM    CO2 21 02/22/2018 06:01 AM    Anion gap 11 02/22/2018 06:01 AM    Glucose 98 02/23/2018 06:45 AM    BUN 16 02/22/2018 06:01 AM    Creatinine 0.78 02/22/2018 06:01 AM    BUN/Creatinine ratio 21 (H) 02/22/2018 06:01 AM    GFR est AA >60 02/22/2018 06:01 AM    GFR est non-AA >60 02/22/2018 06:01 AM    Calcium 9.1 02/22/2018 06:01 AM    Bilirubin, total 0.8 02/22/2018 06:01 AM    AST (SGOT) 18 02/22/2018 06:01 AM    Alk. phosphatase 76 02/22/2018 06:01 AM    Protein, total 7.5 02/22/2018 06:01 AM    Albumin 3.5 02/22/2018 06:01 AM    Globulin 4.0 02/22/2018 06:01 AM    A-G Ratio 0.9 (L) 02/22/2018 06:01 AM    ALT (SGPT) 20 02/22/2018 06:01 AM         ASSESSMENT    ICD-10-CM ICD-9-CM    1. Dementia without behavioral disturbance, unspecified dementia type F03.90 294.20    2. Intellectual disability F79 319        DISCUSSION  Ms. Cassie Domínguez has baseline cognitive impairment to a moderate degree related to developmental delay. She has superimposed neurodegenerative dementia and seems to be having a slow decline over time. She has not tolerated donepezil or memantine. She is tolerating galantamine well and will maintain current dose .   She is currently living in a group home in a supervised situation  We will continue clinical monitoring   Follow-up in 6 months    Daphine Smaller, MD  Diplomate, American Board of Psychiatry & Neurology (Neurology)  Diplomate, American Board of Psychiatry & Neurology (Clinical Neurophysiology)  Diplomate, American Board of Electrodiagnostic Medicine

## 2018-12-13 ENCOUNTER — OFFICE VISIT (OUTPATIENT)
Dept: INTERNAL MEDICINE CLINIC | Age: 68
End: 2018-12-13

## 2018-12-13 VITALS
WEIGHT: 161 LBS | BODY MASS INDEX: 32.46 KG/M2 | RESPIRATION RATE: 16 BRPM | HEIGHT: 59 IN | HEART RATE: 67 BPM | SYSTOLIC BLOOD PRESSURE: 100 MMHG | DIASTOLIC BLOOD PRESSURE: 70 MMHG | OXYGEN SATURATION: 94 % | TEMPERATURE: 97.7 F

## 2018-12-13 DIAGNOSIS — E78.00 HYPERCHOLESTEREMIA: Primary | ICD-10-CM

## 2018-12-13 DIAGNOSIS — E55.9 VITAMIN D DEFICIENCY: ICD-10-CM

## 2018-12-13 DIAGNOSIS — F32.5 MAJOR DEPRESSIVE DISORDER WITH SINGLE EPISODE, IN FULL REMISSION (HCC): ICD-10-CM

## 2018-12-13 DIAGNOSIS — M81.0 AGE-RELATED OSTEOPOROSIS WITHOUT CURRENT PATHOLOGICAL FRACTURE: ICD-10-CM

## 2018-12-13 DIAGNOSIS — E66.9 OBESITY (BMI 30-39.9): ICD-10-CM

## 2018-12-13 DIAGNOSIS — F03.91 DEMENTIA WITH BEHAVIORAL DISTURBANCE, UNSPECIFIED DEMENTIA TYPE: ICD-10-CM

## 2018-12-13 DIAGNOSIS — D69.6 THROMBOCYTOPENIA (HCC): ICD-10-CM

## 2018-12-13 DIAGNOSIS — K59.00 CONSTIPATION, UNSPECIFIED CONSTIPATION TYPE: ICD-10-CM

## 2018-12-13 NOTE — PROGRESS NOTES
HPI:  Yong Grimes is a 70y.o. year old female with developmental delay who returns to clinic today for follow up: Osteoporosis, GERD, constipation, hypercholesterolemia, depression, dementia. She is accompanied by her  [place]. 1. GERD: Controlled with as needed Nexium. 2. Osteoporosis: No recent falls and has been doing well on Fosamax. Reviewed most recent bone density. She is due for repeat bone density and has a scheduled for January. 3. Constipation: Controlled well with diet and MiraLAX. 4.  Hypercholesterolemia: She is following a diet low in fat and cholesterol. 5.  Vitamin D deficiency she continues with ergocalciferol weekly. 6.  Depression: She denies any depression symptoms today. She continues to be followed by psychiatry for behavioral issues which have been much improved. 7.  7.  Dementia: She is also been followed by neurology and has been diagnosed with dementia which has been stable. .    Current Outpatient Medications   Medication Sig Dispense Refill    docusate sodium (COLACE) 100 mg capsule TAKE (1) CAPSULE BY MOUTH DAILY 31 Cap PRN    galantamine (RAZADYNE) 4 mg tablet TAKE 1 TABLET BY MOUTH TWICE A DAY 62 Tab 3    alendronate (FOSAMAX) 70 mg tablet TAKE ONE TABLET WEEKLY ON FRIDAY 30 MINUTES BEFORE 1ST MEAL WITH FULLGLASS OF WATER. DO NOT LIE DOWN FOR 30 MINUTES AFTER TAKING 4 Tab 0    VITAMIN B-12 500 mcg tablet TAKE (2) TABLETS (=1000mcg) BY MOUTH DAILY. 62 Tab PRN    calcium carbonate (CALTREX) 600 mg calcium (1,500 mg) tablet TAKE 1 TABLET BY MOUTH TWICE A DAY 60 Tab PRN    loratadine (CLARITIN) 10 mg tablet TAKE 1 TABLET BY MOUTH DAILY FOR ALLERGIES 30 Tab PRN    esomeprazole (NEXIUM) 40 mg capsule TAKE (1) CAPSULE BY MOUTH DAILY 30 Cap PRN    hydrOXYzine HCl (ATARAX) 25 mg tablet Take  by mouth three (3) times daily as needed for Itching.  QUEtiapine (SEROQUEL) 50 mg tablet Take 1 Tab by mouth three (3) times daily.  Indications: BIPOLAR DISORDER IN REMISSION 90 Tab 0    FLUoxetine (PROZAC) 20 mg capsule Take 3 Caps by mouth daily. Indications: ANXIETY WITH DEPRESSION 90 Cap 0    busPIRone (BUSPAR) 10 mg tablet Take 2 Tabs by mouth three (3) times daily. Indications: Generalized Anxiety Disorder 90 Tab 0    inulin (FIBER CHOICE, INULIN,) 1.5 gram chew Take 1 Tab by mouth two (2) times a day. 60 Tab 5    polyethylene glycol (MIRALAX) 17 gram/dose powder MIX 1 CAPFUL (=17GM) IN A GLASS OF WATER & DRINK ONCE DAILY AS NEEDEDFOR CONSTIPATION 527 g 0     No Known Allergies  Social History     Tobacco Use    Smoking status: Never Smoker    Smokeless tobacco: Never Used   Substance Use Topics    Alcohol use: No         Review of Systems   Constitutional: Negative for malaise/fatigue. Respiratory: Negative for shortness of breath. Cardiovascular: Negative for chest pain and leg swelling. Gastrointestinal: Negative for abdominal pain and heartburn. Neurological: Negative for dizziness and headaches. Physical Exam   Constitutional: She appears well-developed. No distress. HENT:   Head: Normocephalic and atraumatic. Cardiovascular: Normal rate, regular rhythm and intact distal pulses. Pulmonary/Chest: Effort normal and breath sounds normal. She has no wheezes. Abdominal: Soft. Bowel sounds are normal. She exhibits no distension and no mass. There is no tenderness. Musculoskeletal: She exhibits no edema. Psychiatric: She has a normal mood and affect. Nursing note and vitals reviewed. Visit Vitals  /70 (BP 1 Location: Left arm, BP Patient Position: Sitting)   Pulse 67   Temp 97.7 °F (36.5 °C) (Oral)   Resp 16   Ht 4' 11\" (1.499 m)   Wt 161 lb (73 kg)   SpO2 94%   BMI 32.52 kg/m²       Assessment & Plan:    ICD-10-CM ICD-9-CM    1. Hypercholesteremia  Continue working on a diabetic diet.  D35.78 308.1 METABOLIC PANEL, COMPREHENSIVE      LIPID PANEL      CBC WITH AUTOMATED DIFF   2. Age-related osteoporosis without current pathological fracture  Continue Fosamax. Check bone density. Continue with calcium containing diet and vitamin D supplementation. M81.0 733.01    3. Constipation, unspecified constipation type  MiraLAX as needed well-controlled. K59.00 564.00    4. Thrombocytopenia ([address]  Recheck level. D69.6 287.5    5. Vitamin D deficiency E55.9 268.9 VITAMIN D, 25 HYDROXY   6. Dementia with behavioral disturbance, unspecified dementia type  Stable and followed by neurology. F03.91 294.21    7. Obesity (BMI 30-39. 9)  Counseled regarding elevated BMI and current weight goals. Reviewed weight loss strategies including dietary changes and exercise. E66.9 278.00    8. Depression: Controlled on current medication and followed by psychiatry. due for second dose of shingles    Follow-up Disposition:  Return in about 6 months (around 6/13/2019). Advised her to call back or return to office if symptoms worsen/change/persist.  Discussed expected course/resolution/complications of diagnosis in detail with patient. Medication risks/benefits/costs/interactions/alternatives discussed with patient. She was given an after visit summary which includes diagnoses, current medications, & vitals. She expressed understanding with the diagnosis and plan.

## 2018-12-13 NOTE — PROGRESS NOTES
Chief Complaint   Patient presents with    Cholesterol Problem    Depression     Patient is here with caregiver for her 6 month follow up.

## 2019-01-16 ENCOUNTER — HOSPITAL ENCOUNTER (OUTPATIENT)
Dept: MAMMOGRAPHY | Age: 69
Discharge: HOME | End: 2019-01-16
Attending: INTERNAL MEDICINE
Payer: MEDICARE

## 2019-01-16 PROCEDURE — 77080 DXA BONE DENSITY AXIAL: CPT

## 2019-01-30 ENCOUNTER — HOSPITAL ENCOUNTER (OUTPATIENT)
Dept: LAB | Age: 69
Discharge: HOME | End: 2019-01-30
Payer: MEDICARE

## 2019-01-30 ENCOUNTER — OFFICE VISIT (OUTPATIENT)
Dept: NEUROLOGY | Age: 69
End: 2019-01-30

## 2019-01-30 VITALS
OXYGEN SATURATION: 98 % | BODY MASS INDEX: 33.87 KG/M2 | WEIGHT: 168 LBS | HEIGHT: 59 IN | RESPIRATION RATE: 16 BRPM | SYSTOLIC BLOOD PRESSURE: 122 MMHG | HEART RATE: 67 BPM | DIASTOLIC BLOOD PRESSURE: 82 MMHG

## 2019-01-30 DIAGNOSIS — F79 INTELLECTUAL DISABILITY: ICD-10-CM

## 2019-01-30 DIAGNOSIS — F03.90 DEMENTIA WITHOUT BEHAVIORAL DISTURBANCE, UNSPECIFIED DEMENTIA TYPE: Primary | ICD-10-CM

## 2019-01-30 PROCEDURE — 36415 COLL VENOUS BLD VENIPUNCTURE: CPT

## 2019-01-30 PROCEDURE — 85025 COMPLETE CBC W/AUTO DIFF WBC: CPT

## 2019-01-30 PROCEDURE — 80061 LIPID PANEL: CPT

## 2019-01-30 PROCEDURE — 82306 VITAMIN D 25 HYDROXY: CPT

## 2019-01-30 PROCEDURE — 80053 COMPREHEN METABOLIC PANEL: CPT

## 2019-01-30 NOTE — PROGRESS NOTES
Chief Complaint Patient presents with  Memory Loss HISTORY OF PRESENT ILLNESS Camille Aguilar came back for follow-up. She came with her caregiver. No change since last seen. Memory and overall behavior has been stable and somewhat better per caregiver. She is tolerating galantamine 4 mg twice a day quite well. RECAP She has been diagnosed with neurodegenerative dementia superimposed on baseline intellectual disability. She keeps asking the same question multiple times a day and is now less oriented in general to her surroundings then she has been in the past.   
She was tried on memantine but it also caused some GI issues. She was tried on Aricept in the past which caused diarrhea and was discontinued. She has also had behavioral issues in the past and has been hospitalized in the inpatient psychiatric unit. She needs assistance with most activities of daily living. She is living in a group home. She had oral dyskinesias which improved after she stopped Risperdal 
 
 
Current Outpatient Medications Medication Sig  
 alendronate (FOSAMAX) 70 mg tablet TAKE ONE TABLET WEEKLY ON FRIDAY 30 MINUTES BEFORE 1ST MEAL WITH FULLGLASS OF WATER. DO NOT LIE DOWN FOR 30 MINUTES AFTER TAKING  
 docusate sodium (COLACE) 100 mg capsule TAKE (1) CAPSULE BY MOUTH DAILY  galantamine (RAZADYNE) 4 mg tablet TAKE 1 TABLET BY MOUTH TWICE A DAY  VITAMIN B-12 500 mcg tablet TAKE (2) TABLETS (=1000mcg) BY MOUTH DAILY.  calcium carbonate (CALTREX) 600 mg calcium (1,500 mg) tablet TAKE 1 TABLET BY MOUTH TWICE A DAY  loratadine (CLARITIN) 10 mg tablet TAKE 1 TABLET BY MOUTH DAILY FOR ALLERGIES  esomeprazole (NEXIUM) 40 mg capsule TAKE (1) CAPSULE BY MOUTH DAILY  hydrOXYzine HCl (ATARAX) 25 mg tablet Take  by mouth three (3) times daily as needed for Itching.  QUEtiapine (SEROQUEL) 50 mg tablet Take 1 Tab by mouth three (3) times daily.  Indications: BIPOLAR DISORDER IN REMISSION  
  FLUoxetine (PROZAC) 20 mg capsule Take 3 Caps by mouth daily. Indications: ANXIETY WITH DEPRESSION  
 busPIRone (BUSPAR) 10 mg tablet Take 2 Tabs by mouth three (3) times daily. Indications: Generalized Anxiety Disorder  inulin (FIBER CHOICE, INULIN,) 1.5 gram chew Take 1 Tab by mouth two (2) times a day.  polyethylene glycol (MIRALAX) 17 gram/dose powder MIX 1 CAPFUL (=17GM) IN A GLASS OF WATER & DRINK ONCE DAILY AS NEEDEDFOR CONSTIPATION No current facility-administered medications for this visit. PHYSICAL EXAMINATION:   
Visit Vitals /82 Pulse 67 Resp 16 Ht 4' 11\" (1.499 m) Wt 76.2 kg (168 lb) SpO2 98% BMI 33.93 kg/m² NEUROLOGICAL EXAMINATION:    
Mental Status:   Alert and oriented to person, place. She does quite poorly on cognitive assessment and has difficulties with visuospatial, executive function, memory, abstraction and orientation. She could not tell me today's date, month or year. She could not remember what she ate for breakfast.  She could not tell me where she was currently. Cranial Nerves:   
II, III, IV, VI:  Visual acuity grossly intact. Visual fields are normal.   
Pupils are equal, round, and reactive to light and accommodation. Extra-ocular movements are full and fluid. V-XII: Hearing is grossly intact. Facial features are symmetric, with normal sensation and strength. The palate rises symmetrically and the tongue protrudes midline. Sternocleidomastoids 5/5. Motor Examination: Normal tone, bulk, and strength. 5/5 muscle strength throughout. No cogwheel rigidity or clonus present. Sensory exam:  Normal throughout to pinprick, temperature, and vibration sense. Coordination:  Finger to nose and rapid arm movement testing was normal.   No resting or intention tremor Gait and Station:  Steady. Normal arm swing. No Rhomberg or pronator drift. No muscle wasting or fasiculations noted. Reflexes:  DTRs 2+ throughout. Toes downgoing. Alvenia Silvan / IMAGING Lab Results Component Value Date/Time WBC 4.6 07/02/2018 10:54 AM  
 HGB 12.3 07/02/2018 10:54 AM  
 HCT 38.6 07/02/2018 10:54 AM  
 PLATELET 191 (L) 31/07/4936 10:54 AM  
 MCV 90 07/02/2018 10:54 AM  
 
Lab Results Component Value Date/Time Sodium 137 02/22/2018 06:01 AM  
 Potassium 3.7 02/22/2018 06:01 AM  
 Chloride 105 02/22/2018 06:01 AM  
 CO2 21 02/22/2018 06:01 AM  
 Anion gap 11 02/22/2018 06:01 AM  
 Glucose 98 02/23/2018 06:45 AM  
 BUN 16 02/22/2018 06:01 AM  
 Creatinine 0.78 02/22/2018 06:01 AM  
 BUN/Creatinine ratio 21 (H) 02/22/2018 06:01 AM  
 GFR est AA >60 02/22/2018 06:01 AM  
 GFR est non-AA >60 02/22/2018 06:01 AM  
 Calcium 9.1 02/22/2018 06:01 AM  
 Bilirubin, total 0.8 02/22/2018 06:01 AM  
 AST (SGOT) 18 02/22/2018 06:01 AM  
 Alk. phosphatase 76 02/22/2018 06:01 AM  
 Protein, total 7.5 02/22/2018 06:01 AM  
 Albumin 3.5 02/22/2018 06:01 AM  
 Globulin 4.0 02/22/2018 06:01 AM  
 A-G Ratio 0.9 (L) 02/22/2018 06:01 AM  
 ALT (SGPT) 20 02/22/2018 06:01 AM  
 
 
 
ASSESSMENT 
  ICD-10-CM ICD-9-CM 1. Dementia without behavioral disturbance, unspecified dementia type F03.90 294.20   
2. Intellectual disability F79 319 DISCUSSION Ms. Talya Sin has baseline cognitive impairment to a moderate degree related to developmental delay. She has superimposed neurodegenerative dementia and seems to be having a slow decline over time. She has not tolerated donepezil or memantine. She is tolerating galantamine well and will maintain current dose . Caregivers and family is very reluctant to make any further changes to her medications as she she has not tolerated several medications in the past 
She is currently living in a group home in a supervised situation We will continue clinical monitoring Follow-up in 6 months Stanislaw Zayas MD 
 Ranjit Garcia Board of Psychiatry & Neurology (Neurology) Ranjit Garcia Board of Psychiatry & Neurology (Clinical Neurophysiology) Diplomate, [facility] Board of Electrodiagnostic Medicine This note will not be viewable in [address]

## 2019-01-31 LAB
25(OH)D3+25(OH)D2 SERPL-MCNC: 23.1 NG/ML (ref 30–100)
ALBUMIN SERPL-MCNC: 4 G/DL (ref 3.6–4.8)
ALBUMIN/GLOB SERPL: 1.6 {RATIO} (ref 1.2–2.2)
ALP SERPL-CCNC: 92 IU/L (ref 39–117)
ALT SERPL-CCNC: 19 IU/L (ref 0–32)
AST SERPL-CCNC: 17 IU/L (ref 0–40)
BASOPHILS # BLD AUTO: 0 X10E3/UL (ref 0–0.2)
BASOPHILS NFR BLD AUTO: 0 %
BILIRUB SERPL-MCNC: 0.3 MG/DL (ref 0–1.2)
BUN SERPL-MCNC: 18 MG/DL (ref 8–27)
BUN/CREAT SERPL: 21 (ref 12–28)
CALCIUM SERPL-MCNC: 9 MG/DL (ref 8.7–10.3)
CHLORIDE SERPL-SCNC: 102 MMOL/L (ref 96–106)
CHOLEST SERPL-MCNC: 241 MG/DL (ref 100–199)
CO2 SERPL-SCNC: 25 MMOL/L (ref 20–29)
CREAT SERPL-MCNC: 0.85 MG/DL (ref 0.57–1)
EOSINOPHIL # BLD AUTO: 0.1 X10E3/UL (ref 0–0.4)
EOSINOPHIL NFR BLD AUTO: 2 %
ERYTHROCYTE [DISTWIDTH] IN BLOOD BY AUTOMATED COUNT: 14.2 % (ref 12.3–15.4)
GLOBULIN SER CALC-MCNC: 2.5 G/DL (ref 1.5–4.5)
GLUCOSE SERPL-MCNC: 69 MG/DL (ref 65–99)
HCT VFR BLD AUTO: 39.7 % (ref 34–46.6)
HDLC SERPL-MCNC: 57 MG/DL
HGB BLD-MCNC: 12.9 G/DL (ref 11.1–15.9)
IMM GRANULOCYTES # BLD AUTO: 0 X10E3/UL (ref 0–0.1)
IMM GRANULOCYTES NFR BLD AUTO: 0 %
LDLC SERPL CALC-MCNC: 166 MG/DL (ref 0–99)
LYMPHOCYTES # BLD AUTO: 1.3 X10E3/UL (ref 0.7–3.1)
LYMPHOCYTES NFR BLD AUTO: 27 %
MCH RBC QN AUTO: 29.4 PG (ref 26.6–33)
MCHC RBC AUTO-ENTMCNC: 32.5 G/DL (ref 31.5–35.7)
MCV RBC AUTO: 90 FL (ref 79–97)
MONOCYTES # BLD AUTO: 0.3 X10E3/UL (ref 0.1–0.9)
MONOCYTES NFR BLD AUTO: 7 %
NEUTROPHILS # BLD AUTO: 3 X10E3/UL (ref 1.4–7)
NEUTROPHILS NFR BLD AUTO: 64 %
PLATELET # BLD AUTO: 162 X10E3/UL (ref 150–379)
POTASSIUM SERPL-SCNC: 4.2 MMOL/L (ref 3.5–5.2)
PROT SERPL-MCNC: 6.5 G/DL (ref 6–8.5)
RBC # BLD AUTO: 4.39 X10E6/UL (ref 3.77–5.28)
SODIUM SERPL-SCNC: 142 MMOL/L (ref 134–144)
TRIGL SERPL-MCNC: 89 MG/DL (ref 0–149)
VLDLC SERPL CALC-MCNC: 18 MG/DL (ref 5–40)
WBC # BLD AUTO: 4.7 X10E3/UL (ref 3.4–10.8)

## 2019-03-04 ENCOUNTER — TELEPHONE (OUTPATIENT)
Dept: INTERNAL MEDICINE CLINIC | Age: 69
End: 2019-03-04

## 2019-03-04 NOTE — TELEPHONE ENCOUNTER
Current order in med list for miralax is daily. Message left for a returned call to clarify what is needed.

## 2019-03-04 NOTE — TELEPHONE ENCOUNTER
Kay Simmonds [facility] - [phone]  Need Dr Russel Ontiveros needs to change Milax  This was changed from every other day to everyday.    Please advise -

## 2019-03-05 ENCOUNTER — TELEPHONE (OUTPATIENT)
Dept: INTERNAL MEDICINE CLINIC | Age: 69
End: 2019-03-05

## 2019-03-05 RX ORDER — POLYETHYLENE GLYCOL 3350 17 G/17G
POWDER, FOR SOLUTION ORAL
Qty: 527 G | Refills: 0 | Status: SHIPPED | OUTPATIENT
Start: 2019-03-05 | End: 2020-12-03 | Stop reason: SDUPTHER

## 2019-03-05 NOTE — TELEPHONE ENCOUNTER
Spoke to Almita w/ group home, reports patient has loose stools several times a day for the past month. Requesting new order to decrease frequency of miralax to once a week, please advise.

## 2019-03-05 NOTE — TELEPHONE ENCOUNTER
Spoke to Manager, [address], per verbal order Dr. Sugey Traore, patient is to take Miralax Daily, as needed for constipation. Advised if she is not constipated and having bowel movements do not give the miralax. She verbalized understanding.

## 2019-03-05 NOTE — TELEPHONE ENCOUNTER
Mayra Adam with [facility] [phone]     Returned yesterday's call from Staci Hussein.

## 2019-03-05 NOTE — TELEPHONE ENCOUNTER
Per verbal order Dr. Melo Mayur order sent to pharmacy and faxed to [facility] as requested to fax # [phone].

## 2019-03-12 ENCOUNTER — TELEPHONE (OUTPATIENT)
Dept: INTERNAL MEDICINE CLINIC | Age: 69
End: 2019-03-12

## 2019-03-12 NOTE — TELEPHONE ENCOUNTER
Spoke to pharmacist, she needed clarification on script for Miralax, clarified per verbal order Dr. Jesse Durán , patient is to take daily as needed for constipation. She verbalized understanding.

## 2019-05-09 ENCOUNTER — OFFICE VISIT (OUTPATIENT)
Dept: INTERNAL MEDICINE CLINIC | Age: 69
End: 2019-05-09

## 2019-05-09 VITALS
BODY MASS INDEX: 33.32 KG/M2 | DIASTOLIC BLOOD PRESSURE: 66 MMHG | OXYGEN SATURATION: 96 % | HEIGHT: 59 IN | WEIGHT: 165.3 LBS | HEART RATE: 65 BPM | TEMPERATURE: 98.6 F | RESPIRATION RATE: 18 BRPM | SYSTOLIC BLOOD PRESSURE: 112 MMHG

## 2019-05-09 DIAGNOSIS — R93.7 ABNORMAL BONE DENSITY SCREENING: ICD-10-CM

## 2019-05-09 DIAGNOSIS — R62.50 DEVELOPMENTAL DELAY: ICD-10-CM

## 2019-05-09 DIAGNOSIS — K21.9 GASTROESOPHAGEAL REFLUX DISEASE WITHOUT ESOPHAGITIS: ICD-10-CM

## 2019-05-09 DIAGNOSIS — F03.918: ICD-10-CM

## 2019-05-09 DIAGNOSIS — M81.0 AGE-RELATED OSTEOPOROSIS WITHOUT CURRENT PATHOLOGICAL FRACTURE: Primary | ICD-10-CM

## 2019-05-09 DIAGNOSIS — E78.00 HYPERCHOLESTEREMIA: ICD-10-CM

## 2019-05-09 DIAGNOSIS — F33.42 RECURRENT MAJOR DEPRESSIVE DISORDER, IN FULL REMISSION (HCC): ICD-10-CM

## 2019-05-09 NOTE — PROGRESS NOTES
HPI: 
Yoselyn Phan is a 70y.o. year old female who returns to clinic today for follow up: Osteoporosis, GERD, constipation, hypercholesterolemia, depression, dementia. Krissy Alonso is accompanied by her  Nancy. 1. GERD: Controlled with as needed Nexium. 2. Osteoporosis: No recent falls and has been doing well on Fosamax.  Reviewed most recent bone density. 3. Constipation: Controlled well with diet and MiraLAX as needed. 4.  Hypercholesterolemia: She is following a diet low in fat and cholesterol. Exercise: walking outside. 5.  Vitamin D deficiency she continues with ergocalciferol weekly. 6.  Depression: followed by psychiatry and pt and care taker feel she has been doing well. 7.  Dementia: She is also been followed by neurology and has been diagnosed with dementia which has been stable. Current Outpatient Medications Medication Sig Dispense Refill  VITAMIN D2 50,000 unit capsule TAKE 1 CAPSULE ONCE A WEEK ON FRIDAY. 12 Cap 3  
 alendronate (FOSAMAX) 70 mg tablet TAKE ONE TABLET WEEKLY ON FRIDAY 30 MINUTES BEFORE 1ST MEAL WITH FULLGLASS OF WATER. DO NOT LIE DOWN FOR 30 MINUTES AFTER TAKING 4 Tab 4  
 docusate sodium (COLACE) 100 mg capsule TAKE (1) CAPSULE BY MOUTH DAILY 31 Cap PRN  
 VITAMIN B-12 500 mcg tablet TAKE (2) TABLETS (=1000mcg) BY MOUTH DAILY. 62 Tab PRN  
 calcium carbonate (CALTREX) 600 mg calcium (1,500 mg) tablet TAKE 1 TABLET BY MOUTH TWICE A DAY 60 Tab PRN  
 loratadine (CLARITIN) 10 mg tablet TAKE 1 TABLET BY MOUTH DAILY FOR ALLERGIES 30 Tab PRN  
 esomeprazole (NEXIUM) 40 mg capsule TAKE (1) CAPSULE BY MOUTH DAILY 30 Cap PRN  
 hydrOXYzine HCl (ATARAX) 25 mg tablet Take  by mouth three (3) times daily as needed for Itching.  QUEtiapine (SEROQUEL) 50 mg tablet Take 1 Tab by mouth three (3) times daily. Indications: BIPOLAR DISORDER IN REMISSION 90 Tab 0  
 FLUoxetine (PROZAC) 20 mg capsule Take 3 Caps by mouth daily. Indications: ANXIETY WITH DEPRESSION 90 Cap 0  
 busPIRone (BUSPAR) 10 mg tablet Take 2 Tabs by mouth three (3) times daily. Indications: Generalized Anxiety Disorder 90 Tab 0  
 inulin (FIBER CHOICE, INULIN,) 1.5 gram chew Take 1 Tab by mouth two (2) times a day. 60 Tab 5  polyethylene glycol (MIRALAX) 17 gram/dose powder Mix 1 capful (=17GM) IN A GLASS OF WATER DRINK DAILY AS NEEDED FOR CONSTIPATION. 527 g 0  
 galantamine (RAZADYNE) 4 mg tablet TAKE 1 TABLET BY MOUTH TWICE A DAY 62 Tab 3 No Known Allergies Social History Tobacco Use  Smoking status: Never Smoker  Smokeless tobacco: Never Used Substance Use Topics  Alcohol use: No  
   
 
Review of Systems Constitutional: Negative for malaise/fatigue. Respiratory: Negative for shortness of breath. Cardiovascular: Negative for chest pain and leg swelling. Gastrointestinal: Negative for abdominal pain and heartburn. Neurological: Negative for dizziness and headaches. Physical Exam  
Constitutional: She appears well-developed. No distress. HENT:  
Head: Normocephalic and atraumatic. Cardiovascular: Normal rate, regular rhythm and intact distal pulses. Pulmonary/Chest: Effort normal and breath sounds normal. She has no wheezes. Abdominal: Soft. Bowel sounds are normal. She exhibits no distension and no mass. There is no tenderness. Musculoskeletal: She exhibits no edema. Psychiatric: She has a normal mood and affect. Nursing note and vitals reviewed. Visit Vitals /66 (BP 1 Location: Right arm) Pulse 65 Temp 98.6 °F (37 °C) (Oral) Resp 18 Ht 4' 11\" (1.499 m) Wt 165 lb 4.8 oz (75 kg) SpO2 96% BMI 33.39 kg/m² Assessment & Plan: ICD-10-CM ICD-9-CM 1. Age-related osteoporosis without current pathological fracture Continue current medication and calcium and vitamin D and diet and supplement. Continue with weekly vitamin D and will monitor vitamin D level. Reviewed last bone density and there was concern about possible lumbar fracture will obtain lumbar x-ray as that has not been done. M81.0 733.01   
2. Abnormal bone density screening R93.7 794.9 XR SPINE LUMB 2 OR 3 V  
3. Gastroesophageal reflux disease without esophagitis Well-controlled continue current medication. K21.9 530.81   
4. Hypercholesteremia Continue with lifestyle management. E78.00 272.0 5. Recurrent major depressive disorder, in full remission ([facility] [address] Stable and followed by psychiatry. F33.42 296.36   
6. Developmental delay R62.50 783.40   
7. 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 F03.91 294.21   
  just had nl mammogram per caretaker last week at [facility]. Orders Placed This Encounter  XR SPINE LUMB 2 OR 3 V Follow-up in 3 months. Advised her to call back or return to office if symptoms worsen/change/persist. 
Discussed expected course/resolution/complications of diagnosis in detail with patient. Medication risks/benefits/costs/interactions/alternatives discussed with patient. She was given an after visit summary which includes diagnoses, current medications, & vitals. She expressed understanding with the diagnosis and plan.

## 2019-05-13 DIAGNOSIS — F03.90 DEMENTIA WITHOUT BEHAVIORAL DISTURBANCE, UNSPECIFIED DEMENTIA TYPE: ICD-10-CM

## 2019-05-13 DIAGNOSIS — R62.50 DEVELOPMENTAL DELAY: ICD-10-CM

## 2019-05-14 RX ORDER — GALANTAMINE 4 MG/1
TABLET, FILM COATED ORAL
Qty: 62 TAB | Status: SHIPPED | OUTPATIENT
Start: 2019-05-14 | End: 2020-04-13

## 2019-06-05 ENCOUNTER — HOSPITAL ENCOUNTER (OUTPATIENT)
Dept: GENERAL RADIOLOGY | Age: 69
Discharge: HOME | End: 2019-06-05
Attending: INTERNAL MEDICINE
Payer: MEDICARE

## 2019-06-05 DIAGNOSIS — R93.7 ABNORMAL BONE DENSITY SCREENING: ICD-10-CM

## 2019-06-05 PROCEDURE — 72100 X-RAY EXAM L-S SPINE 2/3 VWS: CPT

## 2019-06-06 NOTE — PROGRESS NOTES
See 5/9/19 office note, states bone density mentioned possible lumbar Fx. And that's why plain film was done. Do you want to wait for Dr Daryle Menghini return?

## 2019-06-06 NOTE — PROGRESS NOTES
Spoke with Travis Ramsey caregiver, states Pt. Does not complain of any discomfort so will wait for Dr Ornelas Back.  Thx

## 2019-06-13 RX ORDER — ESOMEPRAZOLE MAGNESIUM 40 MG/1
CAPSULE, DELAYED RELEASE ORAL
Qty: 30 CAP | Status: SHIPPED | OUTPATIENT
Start: 2019-06-13 | End: 2020-05-15

## 2019-06-13 RX ORDER — CALCIUM CARBONATE 600 MG
TABLET ORAL
Qty: 60 TAB | Status: SHIPPED | OUTPATIENT
Start: 2019-06-13 | End: 2020-05-15

## 2019-06-14 RX ORDER — ALENDRONATE SODIUM 70 MG/1
TABLET ORAL
Qty: 4 TAB | Refills: 0 | Status: SHIPPED | OUTPATIENT
Start: 2019-06-14 | End: 2019-07-12 | Stop reason: SDUPTHER

## 2019-07-26 ENCOUNTER — OFFICE VISIT (OUTPATIENT)
Dept: NEUROLOGY | Age: 69
End: 2019-07-26

## 2019-07-26 VITALS
RESPIRATION RATE: 16 BRPM | HEIGHT: 59 IN | WEIGHT: 166 LBS | OXYGEN SATURATION: 94 % | DIASTOLIC BLOOD PRESSURE: 90 MMHG | BODY MASS INDEX: 33.47 KG/M2 | SYSTOLIC BLOOD PRESSURE: 128 MMHG | HEART RATE: 64 BPM

## 2019-07-26 DIAGNOSIS — F03.90 DEMENTIA WITHOUT BEHAVIORAL DISTURBANCE, UNSPECIFIED DEMENTIA TYPE: Primary | ICD-10-CM

## 2019-07-26 DIAGNOSIS — F79 INTELLECTUAL DISABILITY: ICD-10-CM

## 2019-07-26 NOTE — PATIENT INSTRUCTIONS
[facility]   Statement to Patients  April 1, 2014      In an effort to ensure the large volume of patient prescription refills is processed in the most efficient and expeditious manner, we are asking our patients to assist us by calling your Pharmacy for all prescription refills, this will include also your  Mail Order Pharmacy. The pharmacy will contact our office electronically to continue the refill process. Please do not wait until the last minute to call your pharmacy. We need at least 48 hours (2days) to fill prescriptions. We also encourage you to call your pharmacy before going to  your prescription to make sure it is ready. With regard to controlled substance prescription refill requests (narcotic refills) that need to be picked up at our office, we ask your cooperation by providing us with at least 72 hours (3days) notice that you will need a refill. We will not refill narcotic prescription refill requests after 4:00pm on any weekday, Monday through Thursday, or after 2:00pm on Fridays, or on the weekends. We encourage everyone to explore another way of getting your prescription refill request processed using [employer], our patient web portal through our electronic medical record system. [employer] is an efficient and effective way to communicate your medication request directly to the office and  downloadable as an chikis on your smart phone . [employer] also features a review functionality that allows you to view your medication list as well as leave messages for your physician. Are you ready to get connected? If so please review the attatched instructions or speak to any of our staff to get you set up right away! Thank you so much for your cooperation. Should you have any questions please contact our Practice Administrator.     The Physicians and Staff,  [address] [place] [address] Neurology Clinic

## 2019-07-26 NOTE — PROGRESS NOTES
Chief Complaint   Patient presents with    Neurologic Problem         HISTORY OF PRESENT ILLNESS  Johanne Cid came back for follow-up. She came with her caregiver. No change since last seen. Memory and overall behavior has been stable and somewhat better per caregiver. She is tolerating galantamine 4 mg twice a day quite well. RECAP  She has been diagnosed with neurodegenerative dementia superimposed on baseline intellectual disability. She keeps asking the same question multiple times a day and is now less oriented in general to her surroundings then she has been in the past.    She was tried on memantine but it also caused some GI issues. She was tried on Aricept in the past which caused diarrhea and was discontinued. She has also had behavioral issues in the past and has been hospitalized in the inpatient psychiatric unit. She needs assistance with most activities of daily living. She is living in a group home. She had oral dyskinesias which improved after she stopped Risperdal      Current Outpatient Medications   Medication Sig    alendronate (FOSAMAX) 70 mg tablet TAKE ONE TABLET WEEKLY ON FRIDAY 30 MINUTES BEFORE 1ST MEAL WITH FULLGLASS OF WATER. DO NOT LIE DOWN FOR 30 MINUTES AFTER TAKING    esomeprazole (NEXIUM) 40 mg capsule TAKE (1) CAPSULE BY MOUTH DAILY    calcium carbonate (CALTREX) 600 mg calcium (1,500 mg) tablet TAKE 1 TABLET BY MOUTH TWICE A DAY    loratadine (CLARITIN) 10 mg tablet TAKE 1 TABLET BY MOUTH DAILY FOR ALLERGIES    galantamine (RAZADYNE) 4 mg tablet TAKE 1 TABLET BY MOUTH TWICE A DAY    VITAMIN D2 50,000 unit capsule TAKE 1 CAPSULE ONCE A WEEK ON FRIDAY.  docusate sodium (COLACE) 100 mg capsule TAKE (1) CAPSULE BY MOUTH DAILY    VITAMIN B-12 500 mcg tablet TAKE (2) TABLETS (=1000mcg) BY MOUTH DAILY.  hydrOXYzine HCl (ATARAX) 25 mg tablet Take  by mouth three (3) times daily as needed for Itching.     QUEtiapine (SEROQUEL) 50 mg tablet Take 1 Tab by mouth three (3) times daily. Indications: BIPOLAR DISORDER IN REMISSION    FLUoxetine (PROZAC) 20 mg capsule Take 3 Caps by mouth daily. Indications: ANXIETY WITH DEPRESSION    busPIRone (BUSPAR) 10 mg tablet Take 2 Tabs by mouth three (3) times daily. Indications: Generalized Anxiety Disorder    polyethylene glycol (MIRALAX) 17 gram/dose powder Mix 1 capful (=17GM) IN A GLASS OF WATER DRINK DAILY AS NEEDED FOR CONSTIPATION.  inulin (FIBER CHOICE, INULIN,) 1.5 gram chew Take 1 Tab by mouth two (2) times a day. No current facility-administered medications for this visit. PHYSICAL EXAMINATION:    Visit Vitals  /90   Pulse 64   Resp 16   Ht 4' 11\" (1.499 m)   Wt 75.3 kg (166 lb)   SpO2 94%   BMI 33.53 kg/m²       NEUROLOGICAL EXAMINATION:     Mental Status:   Alert and oriented to person, place. She does quite poorly on cognitive assessment and has difficulties with visuospatial, executive function, memory, abstraction and orientation. She could not tell me today's date, month or year. She could not remember what she ate for breakfast.  She could not tell me where she was currently. Cranial Nerves:    II, III, IV, VI:  Visual acuity grossly intact. Visual fields are normal.    Pupils are equal, round, and reactive to light and accommodation. Extra-ocular movements are full and fluid. V-XII: Hearing is grossly intact. Facial features are symmetric, with normal sensation and strength. The palate rises symmetrically and the tongue protrudes midline. Sternocleidomastoids 5/5. Motor Examination: Normal tone, bulk, and strength. 5/5 muscle strength throughout. No cogwheel rigidity or clonus present. Sensory exam:  Normal throughout to pinprick, temperature, and vibration sense. Coordination:  Finger to nose and rapid arm movement testing was normal.   No resting or intention tremor    Gait and Station:  Steady. Normal arm swing. No Rhomberg or pronator drift. No muscle wasting or fasiculations noted. Reflexes:  DTRs 2+ throughout. Toes downgoing. LABS / IMAGING    Lab Results   Component Value Date/Time    WBC 4.7 01/30/2019 08:25 AM    HGB 12.9 01/30/2019 08:25 AM    HCT 39.7 01/30/2019 08:25 AM    PLATELET 102 49/28/2859 08:25 AM    MCV 90 01/30/2019 08:25 AM     Lab Results   Component Value Date/Time    Sodium 142 01/30/2019 08:25 AM    Potassium 4.2 01/30/2019 08:25 AM    Chloride 102 01/30/2019 08:25 AM    CO2 25 01/30/2019 08:25 AM    Anion gap 11 02/22/2018 06:01 AM    Glucose 69 01/30/2019 08:25 AM    Glucose 98 02/23/2018 06:45 AM    BUN 18 01/30/2019 08:25 AM    Creatinine 0.85 01/30/2019 08:25 AM    BUN/Creatinine ratio 21 01/30/2019 08:25 AM    GFR est AA 81 01/30/2019 08:25 AM    GFR est non-AA 70 01/30/2019 08:25 AM    Calcium 9.0 01/30/2019 08:25 AM    Bilirubin, total 0.3 01/30/2019 08:25 AM    AST (SGOT) 17 01/30/2019 08:25 AM    Alk. phosphatase 92 01/30/2019 08:25 AM    Protein, total 6.5 01/30/2019 08:25 AM    Albumin 4.0 01/30/2019 08:25 AM    Globulin 4.0 02/22/2018 06:01 AM    A-G Ratio 1.6 01/30/2019 08:25 AM    ALT (SGPT) 19 01/30/2019 08:25 AM         ASSESSMENT    ICD-10-CM ICD-9-CM    1. Dementia without behavioral disturbance, unspecified dementia type F03.90 294.20    2. Intellectual disability F79 319        DISCUSSION  Ms. Rik Hairston has baseline moderate cognitive impairment  related to developmental delay. She has superimposed neurodegenerative dementia. She has remained stable over the past year. She has not tolerated donepezil or memantine. She is tolerating galantamine well and will maintain current dose .     She is currently living in a group home in a supervised situation  We will continue clinical monitoring   Follow-up in 6 months    Sudeep Jacobs MD  Diplomate, American Board of Psychiatry & Neurology (Neurology)  [facility] Board of Psychiatry & Neurology (Clinical Neurophysiology)  Diplomate, American Board of Electrodiagnostic Medicine  This note will not be viewable in [address]

## 2019-08-15 ENCOUNTER — OFFICE VISIT (OUTPATIENT)
Dept: INTERNAL MEDICINE CLINIC | Age: 69
End: 2019-08-15

## 2019-08-15 VITALS
WEIGHT: 166.4 LBS | HEIGHT: 59 IN | OXYGEN SATURATION: 97 % | TEMPERATURE: 98.3 F | HEART RATE: 61 BPM | BODY MASS INDEX: 33.55 KG/M2 | SYSTOLIC BLOOD PRESSURE: 106 MMHG | RESPIRATION RATE: 16 BRPM | DIASTOLIC BLOOD PRESSURE: 68 MMHG

## 2019-08-15 DIAGNOSIS — F33.42 RECURRENT MAJOR DEPRESSIVE DISORDER, IN FULL REMISSION (HCC): ICD-10-CM

## 2019-08-15 DIAGNOSIS — Z23 ENCOUNTER FOR IMMUNIZATION: ICD-10-CM

## 2019-08-15 DIAGNOSIS — K21.9 GASTROESOPHAGEAL REFLUX DISEASE WITHOUT ESOPHAGITIS: ICD-10-CM

## 2019-08-15 DIAGNOSIS — E78.00 HYPERCHOLESTEREMIA: ICD-10-CM

## 2019-08-15 DIAGNOSIS — R62.50 DEVELOPMENTAL DELAY: ICD-10-CM

## 2019-08-15 DIAGNOSIS — Z00.00 MEDICARE ANNUAL WELLNESS VISIT, SUBSEQUENT: ICD-10-CM

## 2019-08-15 DIAGNOSIS — E78.00 HYPERCHOLESTEROLEMIA: ICD-10-CM

## 2019-08-15 DIAGNOSIS — E55.9 VITAMIN D DEFICIENCY: ICD-10-CM

## 2019-08-15 DIAGNOSIS — M81.0 AGE-RELATED OSTEOPOROSIS WITHOUT CURRENT PATHOLOGICAL FRACTURE: Primary | ICD-10-CM

## 2019-08-15 DIAGNOSIS — Z13.39 SCREENING FOR ALCOHOLISM: ICD-10-CM

## 2019-08-15 DIAGNOSIS — F03.918: ICD-10-CM

## 2019-08-15 NOTE — PATIENT INSTRUCTIONS
Medicare Wellness Visit, Female The best way to live healthy is to have a lifestyle where you eat a well-balanced diet, exercise regularly, limit alcohol use, and quit all forms of tobacco/nicotine, if applicable. Regular preventive services are another way to keep healthy. Preventive services (vaccines, screening tests, monitoring & exams) can help personalize your care plan, which helps you manage your own care. Screening tests can find health problems at the earliest stages, when they are easiest to treat. [facility] follows the current, evidence-based guidelines published by the [facility] ([facility]) when recommending preventive services for our patients. Because we follow these guidelines, sometimes recommendations change over time as research supports it. (For example, mammograms used to be recommended annually. Even though Medicare will still pay for an annual mammogram, the newer guidelines recommend a mammogram every two years for women of average risk.) Of course, you and your doctor may decide to screen more often for some diseases, based on your risk and your health status. Preventive services for you include: - Medicare offers their members a free annual wellness visit, which is time for you and your primary care provider to discuss and plan for your preventive service needs. Take advantage of this benefit every year! 
-All adults over the age of 72 should receive the recommended pneumonia vaccines. Current USPSTF guidelines recommend a series of two vaccines for the best pneumonia protection.  
-All adults should have a flu vaccine yearly and a tetanus vaccine every 10 years. All adults age 61 and older should receive a shingles vaccine once in their lifetime.   
-A bone mass density test is recommended when a woman turns 65 to screen for osteoporosis. This test is only recommended one time, as a screening. Some providers will use this same test as a disease monitoring tool if you already have osteoporosis. -All adults age 38-68 who are overweight should have a diabetes screening test once every three years.  
-Other screening tests and preventive services for persons with diabetes include: an eye exam to screen for diabetic retinopathy, a kidney function test, a foot exam, and stricter control over your cholesterol.  
-Cardiovascular screening for adults with routine risk involves an electrocardiogram (ECG) at intervals determined by your doctor.  
-Colorectal cancer screenings should be done for adults age 54-65 with no increased risk factors for colorectal cancer. There are a number of acceptable methods of screening for this type of cancer. Each test has its own benefits and drawbacks. Discuss with your doctor what is most appropriate for you during your annual wellness visit. The different tests include: colonoscopy (considered the best screening method), a fecal occult blood test, a fecal DNA test, and sigmoidoscopy. -Breast cancer screenings are recommended every other year for women of normal risk, age 54-69. 
-Cervical cancer screenings for women over age 72 are only recommended with certain risk factors.  
-All adults born between [facility] should be screened once for Hepatitis C. Here is a list of your current Health Maintenance items (your personalized list of preventive services) with a due date: 
Health Maintenance Due Topic Date Due  Glaucoma Screening   05/06/2017  
 DTaP/Tdap/Td  (2 - Td) 07/14/2019

## 2019-08-15 NOTE — PROGRESS NOTES
HPI:  Becca Randall is a 70y.o. year old female who returns to clinic today for follow up: Osteoporosis, GERD, constipation, hypercholesterolemia, depression, dementia.  She is accompanied by her  [facility]. 1. GERD: Controlled with as needed Nexium. 2. Osteoporosis: No recent falls and has been doing well on Fosamax.  Reviewed most recent bone density. 3. Constipation: Controlled well with diet and MiraLAX as needed. 4.  Hypercholesterolemia: She is following a diet low in fat and cholesterol. Exercise: walking outside. 5.  Vitamin D deficiency she continues with ergocalciferol weekly. 6.  Depression: followed by psychiatry and pt and care taker feel she has been doing well. 7.  Dementia: She is also been followed by neurology and has been diagnosed with dementia which has been stable. Current Outpatient Medications   Medication Sig Dispense Refill    alendronate (FOSAMAX) 70 mg tablet TAKE ONE TABLET WEEKLY ON FRIDAY 30 MINUTES BEFORE 1ST MEAL WITH FULLGLASS OF WATER. DO NOT LIE DOWN FOR 30 MINUTES AFTER TAKING 12 Tab 3    esomeprazole (NEXIUM) 40 mg capsule TAKE (1) CAPSULE BY MOUTH DAILY 30 Cap PRN    calcium carbonate (CALTREX) 600 mg calcium (1,500 mg) tablet TAKE 1 TABLET BY MOUTH TWICE A DAY 60 Tab PRN    loratadine (CLARITIN) 10 mg tablet TAKE 1 TABLET BY MOUTH DAILY FOR ALLERGIES 31 Tab PRN    galantamine (RAZADYNE) 4 mg tablet TAKE 1 TABLET BY MOUTH TWICE A DAY 62 Tab PRN    VITAMIN D2 50,000 unit capsule TAKE 1 CAPSULE ONCE A WEEK ON FRIDAY. 12 Cap 3    polyethylene glycol (MIRALAX) 17 gram/dose powder Mix 1 capful (=17GM) IN A GLASS OF WATER DRINK DAILY AS NEEDED FOR CONSTIPATION. 527 g 0    docusate sodium (COLACE) 100 mg capsule TAKE (1) CAPSULE BY MOUTH DAILY 31 Cap PRN    VITAMIN B-12 500 mcg tablet TAKE (2) TABLETS (=1000mcg) BY MOUTH DAILY. 62 Tab PRN    hydrOXYzine HCl (ATARAX) 25 mg tablet Take  by mouth three (3) times daily as needed for Itching.       QUEtiapine (SEROQUEL) 50 mg tablet Take 1 Tab by mouth three (3) times daily. Indications: BIPOLAR DISORDER IN REMISSION 90 Tab 0    FLUoxetine (PROZAC) 20 mg capsule Take 3 Caps by mouth daily. Indications: ANXIETY WITH DEPRESSION 90 Cap 0    busPIRone (BUSPAR) 10 mg tablet Take 2 Tabs by mouth three (3) times daily. Indications: Generalized Anxiety Disorder 90 Tab 0    inulin (FIBER CHOICE, INULIN,) 1.5 gram chew Take 1 Tab by mouth two (2) times a day. 60 Tab 5     No Known Allergies  Social History     Tobacco Use    Smoking status: Never Smoker    Smokeless tobacco: Never Used   Substance Use Topics    Alcohol use: No         Review of Systems   Constitutional: Negative for malaise/fatigue. Respiratory: Negative for shortness of breath. Cardiovascular: Negative for chest pain and leg swelling. Gastrointestinal: Negative for abdominal pain and heartburn. Neurological: Negative for dizziness and headaches. Physical Exam   Constitutional: She appears well-developed. No distress. HENT:   Head: Normocephalic and atraumatic. Nose: Nose normal.   Mouth/Throat: Oropharynx is clear and moist.   Eyes: Pupils are equal, round, and reactive to light. Conjunctivae and EOM are normal.   Neck: Carotid bruit is not present. No thyromegaly present. Cardiovascular: Normal rate, regular rhythm, normal heart sounds and intact distal pulses. No murmur heard. Pulmonary/Chest: Effort normal and breath sounds normal. She has no wheezes. Abdominal: Soft. Bowel sounds are normal. She exhibits no distension and no mass. There is no tenderness. Musculoskeletal: She exhibits no edema. Lymphadenopathy:     She has no cervical adenopathy. Psychiatric: She has a normal mood and affect. Nursing note and vitals reviewed.     Visit Vitals  /68 (BP 1 Location: Right arm)   Pulse 61   Temp 98.3 °F (36.8 °C) (Oral)   Resp 16   Ht 4' 11\" (1.499 m)   Wt 166 lb 6.4 oz (75.5 kg)   SpO2 97%   BMI 33.61 kg/m² Assessment & Plan:    ICD-10-CM ICD-9-CM    1. Age-related osteoporosis without current pathological fracture  Reviewed last bone density. Continue with calcium, vitamin D, and Fosamax. Also counseled exercise. M81.0 733.01    2. Hypercholesteremia  Managing with lifestyle. [address] lab work. E78.00 272.0    3. Gastroesophageal reflux disease without esophagitis  Well-controlled continue with current medication. K21.9 530.81    4. Recurrent major depressive disorder, in full remission [facility])  Well-controlled followed by psychiatry. F33.42 296.36    5. 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 Continue current medication. F03.91 294.21    6. Developmental delay R62.50 783.40    7. Vitamin D deficiency  Taking vitamin supplement and will check level. E55.9 268.9 VITAMIN D, 25 HYDROXY      LIPID PANEL   9. Encounter for immunization Z23 V03.89 TETANUS, DIPHTHERIA TOXOIDS AND ACELLULAR PERTUSSIS VACCINE (TDAP), IN INDIVIDS. >=7, IM   10. Medicare annual wellness visit, subsequent  Health maintenance reviewed and updated with patient today at visit. Z00.00 V70.0    11. Screening for alcoholism Z13.39 V79.1 HI ANNUAL ALCOHOL SCREEN 15 MIN   12.  Obesity Counseled regarding elevated BMI and current weight goals. Reviewed weight loss strategies including dietary changes and exercise. Orders Placed This Encounter    TETANUS, DIPHTHERIA TOXOIDS AND ACELLULAR PERTUSSIS VACCINE (TDAP), IN INDIVIDS. >=7, IM    METABOLIC PANEL, COMPREHENSIVE    LIPID PANEL    VITAMIN D, 25 HYDROXY    Annual  Alcohol Screen 15 min ()   Follow-up in 3 months. Advised her to call back or return to office if symptoms worsen/change/persist.  Discussed expected course/resolution/complications of diagnosis in detail with patient. Medication risks/benefits/costs/interactions/alternatives discussed with patient.   She was given an after visit summary which includes diagnoses, current medications, & vitals. She expressed understanding with the diagnosis and plan. This is also the Subsequent Medicare Annual Wellness Exam, performed 12 months or more after the Initial AWV or the last Subsequent AWV    I have reviewed the patient's medical history in detail and updated the computerized patient record. History     Past Medical History:   Diagnosis Date    Allergic rhinitis, cause unspecified 7/15/2010    Anxiety     Detached retina     Diverticulosis     Fracture     left wrist fx & surgery; pt fell     GERD (gastroesophageal reflux disease)     HTN (hypertension)     Memory disorder     Mental retardation     Osteoporosis 8/5/2016    Other and unspecified hyperlipidemia     S/P colonoscopy 03/01/08    Snoring     Thrombocytopenia (HCC)       Past Surgical History:   Procedure Laterality Date    COLONOSCOPY  3-5-2008    HX CATARACT REMOVAL      HX OVARIAN CYST REMOVAL      HX POLYPECTOMY      ablation     Current Outpatient Medications   Medication Sig Dispense Refill    alendronate (FOSAMAX) 70 mg tablet TAKE ONE TABLET WEEKLY ON FRIDAY 30 MINUTES BEFORE 1ST MEAL WITH FULLGLASS OF WATER. DO NOT LIE DOWN FOR 30 MINUTES AFTER TAKING 12 Tab 3    esomeprazole (NEXIUM) 40 mg capsule TAKE (1) CAPSULE BY MOUTH DAILY 30 Cap PRN    calcium carbonate (CALTREX) 600 mg calcium (1,500 mg) tablet TAKE 1 TABLET BY MOUTH TWICE A DAY 60 Tab PRN    loratadine (CLARITIN) 10 mg tablet TAKE 1 TABLET BY MOUTH DAILY FOR ALLERGIES 31 Tab PRN    galantamine (RAZADYNE) 4 mg tablet TAKE 1 TABLET BY MOUTH TWICE A DAY 62 Tab PRN    VITAMIN D2 50,000 unit capsule TAKE 1 CAPSULE ONCE A WEEK ON FRIDAY. 12 Cap 3    polyethylene glycol (MIRALAX) 17 gram/dose powder Mix 1 capful (=17GM) IN A GLASS OF WATER DRINK DAILY AS NEEDED FOR CONSTIPATION.  527 g 0    docusate sodium (COLACE) 100 mg capsule TAKE (1) CAPSULE BY MOUTH DAILY 31 Cap PRN    VITAMIN B-12 500 mcg tablet TAKE (2) TABLETS (=1000mcg) BY MOUTH DAILY. 62 Tab PRN    hydrOXYzine HCl (ATARAX) 25 mg tablet Take  by mouth three (3) times daily as needed for Itching.  QUEtiapine (SEROQUEL) 50 mg tablet Take 1 Tab by mouth three (3) times daily. Indications: BIPOLAR DISORDER IN REMISSION 90 Tab 0    FLUoxetine (PROZAC) 20 mg capsule Take 3 Caps by mouth daily. Indications: ANXIETY WITH DEPRESSION 90 Cap 0    busPIRone (BUSPAR) 10 mg tablet Take 2 Tabs by mouth three (3) times daily. Indications: Generalized Anxiety Disorder 90 Tab 0    inulin (FIBER CHOICE, INULIN,) 1.5 gram chew Take 1 Tab by mouth two (2) times a day. 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 Functional Ability and Level of Safety:   Hearing Loss  Hearing is good. Activities of Daily Living  The home contains: no safety equipment. Patient needs help with:  transportation, shopping, preparing meals, managing medications and managing money    Fall Risk  Fall Risk Assessment, last 12 mths 8/15/2019   Able to walk? Yes   Fall in past 12 months?  No   Number of falls in past 12 months -       Abuse Screen  Patient is not abused    Cognitive Screening   Evaluation of Cognitive Function:  Has dementia. Followed by neurology.      Patient Care Team   Patient Care Team:  Joi Patel MD as PCP - General  Aleida Julian MD as Physician (Ophthalmology)  Shekhar Turner DPM as Physician ([facility])  Irene Burkitt, MD (Neurology)  Donnie Kaplan MD (Psychiatry)    Assessment/Plan   Education and counseling provided:  Are appropriate based on today's review and evaluation        Health Maintenance Due   Topic Date Due    GLAUCOMA SCREENING Q2Y  05/06/2017    DTaP/Tdap/Td series (2 - Td) 07/14/2019

## 2019-10-18 ENCOUNTER — TELEPHONE (OUTPATIENT)
Dept: INTERNAL MEDICINE CLINIC | Age: 69
End: 2019-10-18

## 2019-10-18 NOTE — TELEPHONE ENCOUNTER
[facility]     Calling to schedule a care meeting with the Dr or nurse

## 2019-10-23 ENCOUNTER — HOSPITAL ENCOUNTER (OUTPATIENT)
Dept: LAB | Age: 69
Discharge: HOME | End: 2019-10-23
Payer: MEDICARE

## 2019-10-23 PROCEDURE — 80053 COMPREHEN METABOLIC PANEL: CPT

## 2019-10-23 PROCEDURE — 82306 VITAMIN D 25 HYDROXY: CPT

## 2019-10-23 PROCEDURE — 80061 LIPID PANEL: CPT

## 2019-10-23 PROCEDURE — 36415 COLL VENOUS BLD VENIPUNCTURE: CPT

## 2019-10-24 LAB
25(OH)D3+25(OH)D2 SERPL-MCNC: 21.6 NG/ML (ref 30–100)
ALBUMIN SERPL-MCNC: 4 G/DL (ref 3.5–4.8)
ALBUMIN/GLOB SERPL: 1.7 {RATIO} (ref 1.2–2.2)
ALP SERPL-CCNC: 102 IU/L (ref 39–117)
ALT SERPL-CCNC: 25 IU/L (ref 0–32)
AST SERPL-CCNC: 26 IU/L (ref 0–40)
BILIRUB SERPL-MCNC: 0.3 MG/DL (ref 0–1.2)
BUN SERPL-MCNC: 16 MG/DL (ref 8–27)
BUN/CREAT SERPL: 20 (ref 12–28)
CALCIUM SERPL-MCNC: 8.9 MG/DL (ref 8.7–10.3)
CHLORIDE SERPL-SCNC: 102 MMOL/L (ref 96–106)
CHOLEST SERPL-MCNC: 232 MG/DL (ref 100–199)
CO2 SERPL-SCNC: 27 MMOL/L (ref 20–29)
CREAT SERPL-MCNC: 0.81 MG/DL (ref 0.57–1)
GLOBULIN SER CALC-MCNC: 2.4 G/DL (ref 1.5–4.5)
GLUCOSE SERPL-MCNC: 80 MG/DL (ref 65–99)
HDLC SERPL-MCNC: 56 MG/DL
LDLC SERPL CALC-MCNC: 160 MG/DL (ref 0–99)
POTASSIUM SERPL-SCNC: 4.3 MMOL/L (ref 3.5–5.2)
PROT SERPL-MCNC: 6.4 G/DL (ref 6–8.5)
SODIUM SERPL-SCNC: 139 MMOL/L (ref 134–144)
TRIGL SERPL-MCNC: 82 MG/DL (ref 0–149)
VLDLC SERPL CALC-MCNC: 16 MG/DL (ref 5–40)

## 2019-10-29 ENCOUNTER — TELEPHONE (OUTPATIENT)
Dept: INTERNAL MEDICINE CLINIC | Age: 69
End: 2019-10-29

## 2019-10-29 NOTE — TELEPHONE ENCOUNTER
The [facility] where pt lives at is requesting  A copy of her recent lab work    [phone]

## 2019-11-15 ENCOUNTER — OFFICE VISIT (OUTPATIENT)
Dept: INTERNAL MEDICINE CLINIC | Age: 69
End: 2019-11-15

## 2019-11-15 VITALS
TEMPERATURE: 98.3 F | WEIGHT: 168 LBS | HEIGHT: 59 IN | SYSTOLIC BLOOD PRESSURE: 112 MMHG | DIASTOLIC BLOOD PRESSURE: 66 MMHG | RESPIRATION RATE: 18 BRPM | OXYGEN SATURATION: 98 % | BODY MASS INDEX: 33.87 KG/M2 | HEART RATE: 61 BPM

## 2019-11-15 DIAGNOSIS — F03.918: ICD-10-CM

## 2019-11-15 DIAGNOSIS — K59.09 CHRONIC CONSTIPATION: ICD-10-CM

## 2019-11-15 DIAGNOSIS — K21.9 GASTROESOPHAGEAL REFLUX DISEASE WITHOUT ESOPHAGITIS: ICD-10-CM

## 2019-11-15 DIAGNOSIS — M81.0 AGE-RELATED OSTEOPOROSIS WITHOUT CURRENT PATHOLOGICAL FRACTURE: Primary | ICD-10-CM

## 2019-11-15 DIAGNOSIS — E78.00 HYPERCHOLESTEREMIA: ICD-10-CM

## 2019-11-15 DIAGNOSIS — F33.42 RECURRENT MAJOR DEPRESSIVE DISORDER, IN FULL REMISSION (HCC): ICD-10-CM

## 2020-01-23 ENCOUNTER — OFFICE VISIT (OUTPATIENT)
Dept: INTERNAL MEDICINE CLINIC | Age: 70
End: 2020-01-23

## 2020-01-23 VITALS
DIASTOLIC BLOOD PRESSURE: 79 MMHG | OXYGEN SATURATION: 96 % | RESPIRATION RATE: 18 BRPM | SYSTOLIC BLOOD PRESSURE: 127 MMHG | WEIGHT: 166.6 LBS | TEMPERATURE: 98 F | BODY MASS INDEX: 33.59 KG/M2 | HEIGHT: 59 IN | HEART RATE: 72 BPM

## 2020-01-23 DIAGNOSIS — B35.9 TINEA: Primary | ICD-10-CM

## 2020-01-23 RX ORDER — CHLORPHENIRAMINE MALEATE 4 MG
TABLET ORAL 2 TIMES DAILY
Qty: 45 G | Refills: 1 | Status: SHIPPED | OUTPATIENT
Start: 2020-01-23 | End: 2021-03-16 | Stop reason: ALTCHOICE

## 2020-01-23 NOTE — PROGRESS NOTES
HISTORY OF PRESENT ILLNESS  Cassie Domínguez is a 78 y.o. female. HPI  Patient is accompanied today by her caregiver Deacon Khan. Ms Lydia Nino has a history of osteoporosis, depression, dementia and presents with a foul smelling rash. She is not sure how long it has been present and her caregiver has recently started to help her with bathing. She denies pruritus, or drainage. She was incontinent today of urine in the office, but it is unusual for her to be incontinent. She has no other complaints and has follow up with Cecily Tavarez MD next month. Past Medical History:   Diagnosis Date    Allergic rhinitis, cause unspecified 7/15/2010    Anxiety     Detached retina     Diverticulosis     Fracture     left wrist fx & surgery; pt fell     GERD (gastroesophageal reflux disease)     HTN (hypertension)     Memory disorder     Mental retardation     Osteoporosis 8/5/2016    Other and unspecified hyperlipidemia     S/P colonoscopy 03/01/08    Snoring     Thrombocytopenia (HCC)       Current Outpatient Medications on File Prior to Visit   Medication Sig Dispense Refill     mg capsule TAKE 1 CAPSULE BY MOUTH DAILY 30 Cap PRN    cyanocobalamin (VITAMIN B12) 500 mcg tablet TAKE (2) TABLETS (=1000mcg) BY MOUTH DAILY. 62 Tab PRN    inulin (FIBER CHOICE, INULIN,) 1.5 gram chew Take 1 Tab by mouth two (2) times a day. 60 Tab 11    alendronate (FOSAMAX) 70 mg tablet TAKE ONE TABLET WEEKLY ON FRIDAY 30 MINUTES BEFORE 1ST MEAL WITH FULLGLASS OF WATER.  DO NOT LIE DOWN FOR 30 MINUTES AFTER TAKING 12 Tab 3    esomeprazole (NEXIUM) 40 mg capsule TAKE (1) CAPSULE BY MOUTH DAILY 30 Cap PRN    calcium carbonate (CALTREX) 600 mg calcium (1,500 mg) tablet TAKE 1 TABLET BY MOUTH TWICE A DAY 60 Tab PRN    loratadine (CLARITIN) 10 mg tablet TAKE 1 TABLET BY MOUTH DAILY FOR ALLERGIES 31 Tab PRN    galantamine (RAZADYNE) 4 mg tablet TAKE 1 TABLET BY MOUTH TWICE A DAY 62 Tab PRN    VITAMIN D2 50,000 unit capsule TAKE 1 CAPSULE ONCE A WEEK ON FRIDAY. 12 Cap 3    polyethylene glycol (MIRALAX) 17 gram/dose powder Mix 1 capful (=17GM) IN A GLASS OF WATER DRINK DAILY AS NEEDED FOR CONSTIPATION. 527 g 0    hydrOXYzine HCl (ATARAX) 25 mg tablet Take  by mouth three (3) times daily as needed for Itching.  QUEtiapine (SEROQUEL) 50 mg tablet Take 1 Tab by mouth three (3) times daily. Indications: BIPOLAR DISORDER IN REMISSION 90 Tab 0    FLUoxetine (PROZAC) 20 mg capsule Take 3 Caps by mouth daily. Indications: ANXIETY WITH DEPRESSION 90 Cap 0    busPIRone (BUSPAR) 10 mg tablet Take 2 Tabs by mouth three (3) times daily. Indications: Generalized Anxiety Disorder (Patient taking differently: Take 20 mg by mouth two (2) times a day. Indications: repeated episodes of anxiety) 90 Tab 0     No current facility-administered medications on file prior to visit. NKDA  . ROS  Per HPI  Physical Exam  Visit Vitals  /79   Pulse 72   Temp 98 °F (36.7 °C)   Resp 18   Ht 4' 11\" (1.499 m)   Wt 166 lb 9.6 oz (75.6 kg)   SpO2 96%   BMI 33.65 kg/m²      Patient appears well  Heart[de-identified] normal rate, regular rhythm, normal S1, S2, no murmurs, rubs, clicks or gallops. Chest: clear to auscultation, no wheezes, rales or rhonchi,   Abdomen: soft, non tender, abdominal pannus with area under pannus with diffuse erythema without warmth or drainage in fold  Extremities: no edema  ASSESSMENT and PLAN  Tinea infection   Clotrimazole 1% bid to affected area x 2-4 weeks  Advised to keep area dry, powder if needed  Call or return to clinic if these symptoms worsen or fail to improve as anticipated. follow up as planned with Dani Randall MD next month.

## 2020-01-23 NOTE — PATIENT INSTRUCTIONS
Clotrimazole cream 1% twice daily x 2-4 weeks Keep area dry, dry well after bathing Call or return to clinic prn if these symptoms worsen or fail to improve as anticipated.

## 2020-02-14 ENCOUNTER — OFFICE VISIT (OUTPATIENT)
Dept: INTERNAL MEDICINE CLINIC | Age: 70
End: 2020-02-14

## 2020-02-14 VITALS
WEIGHT: 176 LBS | HEART RATE: 58 BPM | RESPIRATION RATE: 18 BRPM | HEIGHT: 59 IN | SYSTOLIC BLOOD PRESSURE: 114 MMHG | TEMPERATURE: 98.5 F | BODY MASS INDEX: 35.48 KG/M2 | OXYGEN SATURATION: 96 % | DIASTOLIC BLOOD PRESSURE: 66 MMHG

## 2020-02-14 DIAGNOSIS — R35.0 URINE FREQUENCY: ICD-10-CM

## 2020-02-14 DIAGNOSIS — K59.09 CHRONIC CONSTIPATION: ICD-10-CM

## 2020-02-14 DIAGNOSIS — M81.0 AGE-RELATED OSTEOPOROSIS WITHOUT CURRENT PATHOLOGICAL FRACTURE: ICD-10-CM

## 2020-02-14 DIAGNOSIS — F03.918: ICD-10-CM

## 2020-02-14 DIAGNOSIS — K21.9 GASTROESOPHAGEAL REFLUX DISEASE WITHOUT ESOPHAGITIS: ICD-10-CM

## 2020-02-14 DIAGNOSIS — F33.42 RECURRENT MAJOR DEPRESSIVE DISORDER, IN FULL REMISSION (HCC): ICD-10-CM

## 2020-02-14 DIAGNOSIS — E55.9 VITAMIN D DEFICIENCY: ICD-10-CM

## 2020-02-14 DIAGNOSIS — E78.00 HYPERCHOLESTEREMIA: Primary | ICD-10-CM

## 2020-02-14 DIAGNOSIS — E66.9 OBESITY (BMI 30-39.9): ICD-10-CM

## 2020-02-14 LAB
BILIRUB UR QL STRIP: NEGATIVE
GLUCOSE UR-MCNC: NEGATIVE MG/DL
KETONES P FAST UR STRIP-MCNC: NEGATIVE MG/DL
PH UR STRIP: 6 [PH] (ref 4.6–8)
PROT UR QL STRIP: NEGATIVE
SP GR UR STRIP: 1.01 (ref 1–1.03)
UA UROBILINOGEN AMB POC: NORMAL (ref 0.2–1)
URINALYSIS CLARITY POC: CLEAR
URINALYSIS COLOR POC: YELLOW
URINE BLOOD POC: NEGATIVE
URINE LEUKOCYTES POC: NORMAL
URINE NITRITES POC: NEGATIVE

## 2020-02-14 NOTE — PROGRESS NOTES
HPI:  Stephanie Aguilar is a 78y.o. year old female who returns to clinic today for follow up:She is accompanied by her  [facility]. Osteoporosis, GERD, constipation, hypercholesterolemia, depression, dementia.    1. GERD: Controlled with as needed Nexium. 2. Osteoporosis: No recent falls and has been doing well on Fosamax.  Reviewed most recent bone density. 3.  Some urine and odor has been noticed by staff and they are resting a urine test today. Patient denies any problems with her urine. 4.  Hypercholesterolemia: She is following a diet low in fat and cholesterol. Exercise: walking outside. 5.  Vitamin D deficiency she continues with ergocalciferol weekly. 6.  Depression: followed by psychiatry and pt and care taker feel she has been doing well. No depression symptoms. 7.  Dementia: She is also been followed by neurology and memory loss has been stable. .  8.  Rash under her breast and lower abdomen at site of skin folds. She is been using nystatin cream as needed but facility is asking for an order for Goldbond powder. Current Outpatient Medications   Medication Sig Dispense Refill     mg capsule TAKE 1 CAPSULE BY MOUTH DAILY 30 Cap PRN    cyanocobalamin (VITAMIN B12) 500 mcg tablet TAKE (2) TABLETS (=1000mcg) BY MOUTH DAILY. 62 Tab PRN    inulin (FIBER CHOICE, INULIN,) 1.5 gram chew Take 1 Tab by mouth two (2) times a day. 60 Tab 11    alendronate (FOSAMAX) 70 mg tablet TAKE ONE TABLET WEEKLY ON FRIDAY 30 MINUTES BEFORE 1ST MEAL WITH FULLGLASS OF WATER.  DO NOT LIE DOWN FOR 30 MINUTES AFTER TAKING 12 Tab 3    esomeprazole (NEXIUM) 40 mg capsule TAKE (1) CAPSULE BY MOUTH DAILY 30 Cap PRN    calcium carbonate (CALTREX) 600 mg calcium (1,500 mg) tablet TAKE 1 TABLET BY MOUTH TWICE A DAY 60 Tab PRN    loratadine (CLARITIN) 10 mg tablet TAKE 1 TABLET BY MOUTH DAILY FOR ALLERGIES 31 Tab PRN    galantamine (RAZADYNE) 4 mg tablet TAKE 1 TABLET BY MOUTH TWICE A DAY 62 Tab PRN    VITAMIN D2 50,000 unit capsule TAKE 1 CAPSULE ONCE A WEEK ON FRIDAY. 12 Cap 3    polyethylene glycol (MIRALAX) 17 gram/dose powder Mix 1 capful (=17GM) IN A GLASS OF WATER DRINK DAILY AS NEEDED FOR CONSTIPATION. 527 g 0    QUEtiapine (SEROQUEL) 50 mg tablet Take 1 Tab by mouth three (3) times daily. Indications: BIPOLAR DISORDER IN REMISSION 90 Tab 0    FLUoxetine (PROZAC) 20 mg capsule Take 3 Caps by mouth daily. Indications: ANXIETY WITH DEPRESSION 90 Cap 0    busPIRone (BUSPAR) 10 mg tablet Take 2 Tabs by mouth three (3) times daily. Indications: Generalized Anxiety Disorder (Patient taking differently: Take 20 mg by mouth two (2) times a day. Indications: repeated episodes of anxiety) 90 Tab 0    clotrimazole (LOTRIMIN) 1 % topical cream Apply  to affected area two (2) times a day. 45 g 1    hydrOXYzine HCl (ATARAX) 25 mg tablet Take  by mouth three (3) times daily as needed for Itching. No Known Allergies  Social History     Tobacco Use    Smoking status: Never Smoker    Smokeless tobacco: Never Used   Substance Use Topics    Alcohol use: No         Review of Systems   Respiratory: Negative for shortness of breath. Cardiovascular: Negative for chest pain and leg swelling. Gastrointestinal: Negative for abdominal pain and heartburn. Physical Exam  Vitals signs and nursing note reviewed. Constitutional:       General: She is not in acute distress. Appearance: She is well-developed. HENT:      Head: Normocephalic and atraumatic. Cardiovascular:      Rate and Rhythm: Normal rate and regular rhythm. Pulmonary:      Effort: Pulmonary effort is normal.      Breath sounds: Normal breath sounds. No wheezing. Abdominal:      General: Bowel sounds are normal. There is no distension. Palpations: Abdomen is soft. There is no mass. Tenderness: There is no abdominal tenderness. Skin:     Comments: Moist pink skin under both breast and and lower abdomen area. No papules or scaling. Visit Vitals  /66 (BP 1 Location: Right arm)   Pulse (!) 58   Temp 98.5 °F (36.9 °C) (Oral)   Resp 18   Ht 4' 11\" (1.499 m)   Wt 176 lb (79.8 kg)   SpO2 96%   BMI 35.55 kg/m²       Assessment & Plan:    ICD-10-CM ICD-9-CM    1. Hypercholesteremia  Continue with lifestyle management. Check cholesterol panel. E78.00 272.0 LIPID PANEL      METABOLIC PANEL, COMPREHENSIVE      CBC WITH AUTOMATED DIFF   2. Gastroesophageal reflux disease   without esophagitis  Well controlled, continue current medication. K21.9 530.81    3. Age-related osteoporosis without current pathological fracture  Continue with current medication calcium, vitamin D, exercise. M81.0 733.01    4. 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 Chronic constipation  Well-controlled and currently not an issue. K59.09 564.00    6. Urine frequency  Urine dip trace leuks and urine culture sent. R35.0 788.41 AMB POC URINALYSIS DIP STICK AUTO W/O MICRO      CULTURE, URINE   7. Vitamin D deficiency  Check level E55.9 268.9 VITAMIN D, 25 HYDROXY   8. Recurrent major depressive disorder, in full remission ([facility] [address]  Stable and followed by psychiatry. F33.42 296.36    9. Obesity (BMI 30-39. 9)  Counseled regarding elevated BMI and current weight goals. Reviewed weight loss strategies including dietary changes and exercise. E66.9 278.00    10    Yeast skin infections prevention   gold bond medicated body powder bid prn and nystatin cream prn. Orders Placed This Encounter    CULTURE, URINE    LIPID PANEL    METABOLIC PANEL, COMPREHENSIVE    CBC WITH AUTOMATED DIFF    VITAMIN D, 25 HYDROXY    AMB POC URINALYSIS DIP STICK AUTO W/O MICRO    menthol-zinc oxide (GOLD BOND MEDICATED) 0.15-1 % powder   Follow-up in 6 months.   Advised her to call back or return to office if symptoms worsen/change/persist.  Discussed expected course/resolution/complications of diagnosis in detail with patient. Medication risks/benefits/costs/interactions/alternatives discussed with patient. She was given an after visit summary which includes diagnoses, current medications, & vitals. She expressed understanding with the diagnosis and plan.

## 2020-02-16 LAB — BACTERIA UR CULT: NORMAL

## 2020-04-10 DIAGNOSIS — F03.90 DEMENTIA WITHOUT BEHAVIORAL DISTURBANCE, UNSPECIFIED DEMENTIA TYPE: ICD-10-CM

## 2020-04-10 DIAGNOSIS — R62.50 DEVELOPMENTAL DELAY: ICD-10-CM

## 2020-04-13 RX ORDER — GALANTAMINE 4 MG/1
TABLET, FILM COATED ORAL
Qty: 60 TAB | Status: SHIPPED | OUTPATIENT
Start: 2020-04-13 | End: 2021-04-13

## 2020-04-28 ENCOUNTER — TELEPHONE (OUTPATIENT)
Dept: INTERNAL MEDICINE CLINIC | Age: 70
End: 2020-04-28

## 2020-04-28 NOTE — TELEPHONE ENCOUNTER
Spoke with [facility] and let them know that patient takes as needed. They wanted to confirm the change of the medication directions.

## 2020-07-08 LAB
25(OH)D3+25(OH)D2 SERPL-MCNC: 28.5 NG/ML (ref 30–100)
ALBUMIN SERPL-MCNC: 3.9 G/DL (ref 3.8–4.8)
ALBUMIN/GLOB SERPL: 1.6 {RATIO} (ref 1.2–2.2)
ALP SERPL-CCNC: 94 IU/L (ref 39–117)
ALT SERPL-CCNC: 20 IU/L (ref 0–32)
AST SERPL-CCNC: 19 IU/L (ref 0–40)
BASOPHILS # BLD AUTO: 0 X10E3/UL (ref 0–0.2)
BASOPHILS NFR BLD AUTO: 1 %
BILIRUB SERPL-MCNC: 0.4 MG/DL (ref 0–1.2)
BUN SERPL-MCNC: 18 MG/DL (ref 8–27)
BUN/CREAT SERPL: 28 (ref 12–28)
CALCIUM SERPL-MCNC: 8.8 MG/DL (ref 8.7–10.3)
CHLORIDE SERPL-SCNC: 104 MMOL/L (ref 96–106)
CHOLEST SERPL-MCNC: 230 MG/DL (ref 100–199)
CO2 SERPL-SCNC: 23 MMOL/L (ref 20–29)
CREAT SERPL-MCNC: 0.64 MG/DL (ref 0.57–1)
EOSINOPHIL # BLD AUTO: 0.1 X10E3/UL (ref 0–0.4)
EOSINOPHIL NFR BLD AUTO: 2 %
ERYTHROCYTE [DISTWIDTH] IN BLOOD BY AUTOMATED COUNT: 13.2 % (ref 11.7–15.4)
GLOBULIN SER CALC-MCNC: 2.5 G/DL (ref 1.5–4.5)
GLUCOSE SERPL-MCNC: 88 MG/DL (ref 65–99)
HCT VFR BLD AUTO: 37.7 % (ref 34–46.6)
HDLC SERPL-MCNC: 65 MG/DL
HGB BLD-MCNC: 12.6 G/DL (ref 11.1–15.9)
IMM GRANULOCYTES # BLD AUTO: 0 X10E3/UL (ref 0–0.1)
IMM GRANULOCYTES NFR BLD AUTO: 0 %
LDLC SERPL CALC-MCNC: 151 MG/DL (ref 0–99)
LYMPHOCYTES # BLD AUTO: 1.5 X10E3/UL (ref 0.7–3.1)
LYMPHOCYTES NFR BLD AUTO: 33 %
MCH RBC QN AUTO: 30.1 PG (ref 26.6–33)
MCHC RBC AUTO-ENTMCNC: 33.4 G/DL (ref 31.5–35.7)
MCV RBC AUTO: 90 FL (ref 79–97)
MONOCYTES # BLD AUTO: 0.3 X10E3/UL (ref 0.1–0.9)
MONOCYTES NFR BLD AUTO: 7 %
NEUTROPHILS # BLD AUTO: 2.6 X10E3/UL (ref 1.4–7)
NEUTROPHILS NFR BLD AUTO: 57 %
PLATELET # BLD AUTO: 135 X10E3/UL (ref 150–450)
POTASSIUM SERPL-SCNC: 4.1 MMOL/L (ref 3.5–5.2)
PROT SERPL-MCNC: 6.4 G/DL (ref 6–8.5)
RBC # BLD AUTO: 4.19 X10E6/UL (ref 3.77–5.28)
SODIUM SERPL-SCNC: 141 MMOL/L (ref 134–144)
TRIGL SERPL-MCNC: 72 MG/DL (ref 0–149)
VLDLC SERPL CALC-MCNC: 14 MG/DL (ref 5–40)
WBC # BLD AUTO: 4.6 X10E3/UL (ref 3.4–10.8)

## 2020-07-16 ENCOUNTER — OFFICE VISIT (OUTPATIENT)
Dept: INTERNAL MEDICINE CLINIC | Age: 70
End: 2020-07-16

## 2020-07-16 VITALS
HEIGHT: 59 IN | BODY MASS INDEX: 33.47 KG/M2 | OXYGEN SATURATION: 98 % | DIASTOLIC BLOOD PRESSURE: 82 MMHG | WEIGHT: 166 LBS | HEART RATE: 89 BPM | SYSTOLIC BLOOD PRESSURE: 127 MMHG | TEMPERATURE: 98 F | RESPIRATION RATE: 18 BRPM

## 2020-07-16 DIAGNOSIS — Z13.39 SCREENING FOR ALCOHOLISM: ICD-10-CM

## 2020-07-16 DIAGNOSIS — Z00.00 MEDICARE ANNUAL WELLNESS VISIT, SUBSEQUENT: Primary | ICD-10-CM

## 2020-07-16 DIAGNOSIS — F33.42 RECURRENT MAJOR DEPRESSIVE DISORDER, IN FULL REMISSION (HCC): ICD-10-CM

## 2020-07-16 DIAGNOSIS — F03.918: ICD-10-CM

## 2020-07-16 DIAGNOSIS — K59.09 CHRONIC CONSTIPATION: ICD-10-CM

## 2020-07-16 DIAGNOSIS — K21.9 GASTROESOPHAGEAL REFLUX DISEASE WITHOUT ESOPHAGITIS: ICD-10-CM

## 2020-07-16 DIAGNOSIS — M81.0 AGE-RELATED OSTEOPOROSIS WITHOUT CURRENT PATHOLOGICAL FRACTURE: ICD-10-CM

## 2020-07-16 DIAGNOSIS — E78.00 HYPERCHOLESTEREMIA: ICD-10-CM

## 2020-07-16 DIAGNOSIS — Z12.31 VISIT FOR SCREENING MAMMOGRAM: ICD-10-CM

## 2020-07-16 NOTE — PROGRESS NOTES
HPI:  Patricia Morales is a 78y.o. year old female who returns to clinic today for follow up: Mayzacarias Toni.  she has been able to lose 10  Pounds with diet changes and exercise. 1. GERD: Controlled with as needed Nexium. 2. Osteoporosis: No recent falls and has been doing well on Fosamax.  Reviewed most recent bone density. 3.  Hypercholesterolemia: She is following a diet low in fat and cholesterol. Exercise: walking outside. 4.  Vitamin D deficiency she continues with ergocalciferol weekly. 5. .  Depression: followed by psychiatry and pt and care taker feel she has been doing well. No depression symptoms. 6.  Dementia: She is also been followed by neurology and memory loss has been stable. .      Current Outpatient Medications   Medication Sig Dispense Refill    alendronate (FOSAMAX) 70 mg tablet TAKE ONE TABLET WEEKLY ON FRIDAY 30 MINUTES BEFORE 1ST MEAL WITH FULLGLASS OF WATER. DO NOT LIE DOWN FOR 30 MINUTES AFTER TAKING 4 Tab 1    calcium carbonate (CALTREX) 600 mg calcium (1,500 mg) tablet TAKE 1 TABLET BY MOUTH TWICE A DAY 62 Tab 11    esomeprazole (NEXIUM) 40 mg capsule TAKE 1 CAPSULE BY MOUTH DAILY 31 Cap 11    Vitamin D2 1,250 mcg (50,000 unit) capsule TAKE 1 CAPSULE ONCE A WEEK ON FRIDAY. 4 Cap 5    loratadine (CLARITIN) 10 mg tablet Take 1 Tab by mouth daily as needed for Allergies. 30 Tab PRN    galantamine (RAZADYNE) 4 mg tablet TAKE 1 TABLET BY MOUTH TWICE A DAY 60 Tab PRN    menthol-zinc oxide (GOLD BOND MEDICATED) 0.15-1 % powder Apply to skin twice a day as need.   mg capsule TAKE 1 CAPSULE BY MOUTH DAILY 30 Cap PRN    cyanocobalamin (VITAMIN B12) 500 mcg tablet TAKE (2) TABLETS (=1000mcg) BY MOUTH DAILY. 62 Tab PRN    inulin (FIBER CHOICE, INULIN,) 1.5 gram chew Take 1 Tab by mouth two (2) times a day.  60 Tab 11    polyethylene glycol (MIRALAX) 17 gram/dose powder Mix 1 capful (=17GM) IN A GLASS OF WATER DRINK DAILY AS NEEDED FOR CONSTIPATION. 527 g 0    hydrOXYzine HCl (ATARAX) 25 mg tablet Take  by mouth three (3) times daily as needed for Itching.  QUEtiapine (SEROQUEL) 50 mg tablet Take 1 Tab by mouth three (3) times daily. Indications: BIPOLAR DISORDER IN REMISSION 90 Tab 0    FLUoxetine (PROZAC) 20 mg capsule Take 3 Caps by mouth daily. Indications: ANXIETY WITH DEPRESSION 90 Cap 0    busPIRone (BUSPAR) 10 mg tablet Take 2 Tabs by mouth three (3) times daily. Indications: Generalized Anxiety Disorder (Patient taking differently: Take 20 mg by mouth two (2) times a day. Indications: repeated episodes of anxiety) 90 Tab 0    clotrimazole (LOTRIMIN) 1 % topical cream Apply  to affected area two (2) times a day. 45 g 1     No Known Allergies  Social History     Tobacco Use    Smoking status: Never Smoker    Smokeless tobacco: Never Used   Substance Use Topics    Alcohol use: No         Review of Systems   Constitutional: Negative for fever and malaise/fatigue. HENT: Negative for hearing loss. Respiratory: Negative for cough and shortness of breath. Cardiovascular: Negative for chest pain and leg swelling. Gastrointestinal: Positive for constipation (well controlled with diet. ). Negative for abdominal pain, diarrhea and heartburn. Genitourinary: Negative for dysuria and urgency. Musculoskeletal: Negative for back pain. Neurological: Negative for dizziness and headaches. Psychiatric/Behavioral: Negative for depression and suicidal ideas. Physical Exam  Vitals signs and nursing note reviewed. Constitutional:       General: She is not in acute distress. Appearance: She is well-developed. HENT:      Head: Normocephalic and atraumatic. Neck:      Vascular: No carotid bruit. Cardiovascular:      Rate and Rhythm: Normal rate and regular rhythm. Heart sounds: Normal heart sounds. Pulmonary:      Effort: Pulmonary effort is normal.      Breath sounds: Normal breath sounds.  No wheezing. Abdominal:      General: Bowel sounds are normal. There is no distension. Palpations: Abdomen is soft. There is no mass. Tenderness: There is no abdominal tenderness. Musculoskeletal:      Right lower leg: No edema. Left lower leg: No edema. Skin:     Findings: No rash. Neurological:      Mental Status: She is alert. Psychiatric:         Mood and Affect: Mood normal.         Behavior: Behavior normal.       Visit Vitals  /82 (BP 1 Location: Right arm)   Pulse 89   Temp 98 °F (36.7 °C) (Temporal)   Resp 18   Ht 4' 11\" (1.499 m)   Wt 166 lb (75.3 kg)   SpO2 98%   BMI 33.53 kg/m²       Assessment & Plan:    ICD-10-CM ICD-9-CM    1. Medicare annual wellness visit, subsequent   Health maintenance reviewed and updated with patient today at visit. Z00.00 V70.0    2. Screening for alcoholism  Z13.39 V79.1 MA ANNUAL ALCOHOL SCREEN 15 MIN   3. Gastroesophageal reflux disease without esophagitis   Well controlled, continue current medication. K21.9 530.81    4. Age-related osteoporosis without current pathological fracture   Continue fosamax M81.0 733.01    5. 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 Chronic constipation   Stable on diet control K59.09 564.00    7. Hypercholesteremia   Lifestyle management E78.00 272.0    8. Recurrent major depressive disorder, in full remission ([facility] [address] well controlled and followed by psych. F33.42 296.36    9. Visit for screening mammogram  Z12.31 V76.12 [facility] 3D FELICITAS W MAMMO BI SCREENING INCL CAD      Orders Placed This Encounter    [facility] 3D FELICITAS W MAMMO BI SCREENING INCL CAD    Annual  Alcohol Screen 15 min ()    follow up 6 months. Advised her to call back or return to office if symptoms worsen/change/persist.  Discussed expected course/resolution/complications of diagnosis in detail with patient.     Medication risks/benefits/costs/interactions/alternatives discussed with patient. She was given an after visit summary which includes diagnoses, current medications, & vitals. She expressed understanding with the diagnosis and plan. This is also the Subsequent Medicare Annual Wellness Exam, performed 12 months or more after the Initial AWV or the last Subsequent AWV    I have reviewed the patient's medical history in detail and updated the computerized patient record. 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) F03.91     Past Medical History:   Diagnosis Date    Allergic rhinitis, cause unspecified 7/15/2010    Anxiety     Detached retina     Diverticulosis     Fracture     left wrist fx & surgery; pt fell     GERD (gastroesophageal reflux disease)     HTN (hypertension)     Memory disorder     Mental retardation     Osteoporosis 8/5/2016    Other and unspecified hyperlipidemia     S/P colonoscopy 03/01/08    Snoring     Thrombocytopenia (HCC)       Past Surgical History:   Procedure Laterality Date    COLONOSCOPY  3-5-2008    HX CATARACT REMOVAL      HX OVARIAN CYST REMOVAL      HX POLYPECTOMY      ablation     Current Outpatient Medications   Medication Sig Dispense Refill    alendronate (FOSAMAX) 70 mg tablet TAKE ONE TABLET WEEKLY ON FRIDAY 30 MINUTES BEFORE 1ST MEAL WITH FULLGLASS OF WATER. DO NOT LIE DOWN FOR 30 MINUTES AFTER TAKING 4 Tab 1    calcium carbonate (CALTREX) 600 mg calcium (1,500 mg) tablet TAKE 1 TABLET BY MOUTH TWICE A DAY 62 Tab 11    esomeprazole (NEXIUM) 40 mg capsule TAKE 1 CAPSULE BY MOUTH DAILY 31 Cap 11    Vitamin D2 1,250 mcg (50,000 unit) capsule TAKE 1 CAPSULE ONCE A WEEK ON FRIDAY.  4 Cap 5    loratadine (CLARITIN) 10 mg tablet Take 1 Tab by mouth daily as needed for Allergies. 30 Tab PRN    galantamine (RAZADYNE) 4 mg tablet TAKE 1 TABLET BY MOUTH TWICE A DAY 60 Tab PRN    menthol-zinc oxide (GOLD BOND MEDICATED) 0.15-1 % powder Apply to skin twice a day as need.   mg capsule TAKE 1 CAPSULE BY MOUTH DAILY 30 Cap PRN    cyanocobalamin (VITAMIN B12) 500 mcg tablet TAKE (2) TABLETS (=1000mcg) BY MOUTH DAILY. 62 Tab PRN    inulin (FIBER CHOICE, INULIN,) 1.5 gram chew Take 1 Tab by mouth two (2) times a day. 60 Tab 11    polyethylene glycol (MIRALAX) 17 gram/dose powder Mix 1 capful (=17GM) IN A GLASS OF WATER DRINK DAILY AS NEEDED FOR CONSTIPATION. 527 g 0    hydrOXYzine HCl (ATARAX) 25 mg tablet Take  by mouth three (3) times daily as needed for Itching.  QUEtiapine (SEROQUEL) 50 mg tablet Take 1 Tab by mouth three (3) times daily. Indications: BIPOLAR DISORDER IN REMISSION 90 Tab 0    FLUoxetine (PROZAC) 20 mg capsule Take 3 Caps by mouth daily. Indications: ANXIETY WITH DEPRESSION 90 Cap 0    busPIRone (BUSPAR) 10 mg tablet Take 2 Tabs by mouth three (3) times daily. Indications: Generalized Anxiety Disorder (Patient taking differently: Take 20 mg by mouth two (2) times a day. Indications: repeated episodes of anxiety) 90 Tab 0    clotrimazole (LOTRIMIN) 1 % topical cream Apply  to affected area two (2) times a day.  45 g 1     No Known Allergies    Family History   Problem Relation Age of Onset    Diabetes Father     Heart Disease Father      Social History     Tobacco Use    Smoking status: Never Smoker    Smokeless tobacco: Never Used   Substance Use Topics    Alcohol use: No       Depression Risk Factor Screening:     3 most recent PHQ Screens 7/16/2020   Little interest or pleasure in doing things Not at all   Feeling down, depressed, irritable, or hopeless Not at all   Total Score PHQ 2 0       Alcohol Risk Factor Screening:   Do you average 1 drink per night or more than 7 drinks a week:  No    On any one occasion in the past three months have you have had more than 3 drinks containing alcohol:  No      Functional Ability and Level of Safety:   Hearing: Hearing is good. Activities of Daily Living: The home contains: no safety equipment. Patient needs help with:  transportation, shopping, preparing meals, managing medications and managing money     Ambulation: with no difficulty     Fall Risk:  Fall Risk Assessment, last 12 mths 7/16/2020   Able to walk? Yes   Fall in past 12 months? No   Number of falls in past 12 months -     Abuse Screen:  Patient is not abused       Cognitive Screening   She has baseline dementia and no changes per caretaker.            Patient Care Team   Patient Care Team:  Milvia Dixon MD as PCP - General  Loel Juan Antonio Angelica Ochoa MD as PCP - [facility] Empaneled Provider  Jose Muro MD as Physician (Ophthalmology)  Ginna Wilkins DPM as Physician (Jaxon Torres)  Santos Hernandez MD (Neurology)  Ghada Renteria MD (Psychiatry)    Assessment/Plan   Education and counseling provided:  Are appropriate based on today's review and evaluation        Health Maintenance Due   Topic Date Due    GLAUCOMA SCREENING Q2Y  05/06/2017

## 2020-07-16 NOTE — PATIENT INSTRUCTIONS

## 2020-08-24 RX ORDER — ALENDRONATE SODIUM 70 MG/1
TABLET ORAL
Qty: 4 TAB | Refills: 1 | Status: SHIPPED | OUTPATIENT
Start: 2020-08-24 | End: 2020-10-11

## 2020-08-24 NOTE — TELEPHONE ENCOUNTER
Requested Prescriptions     Pending Prescriptions Disp Refills    alendronate (FOSAMAX) 70 mg tablet 4 Tab 1       [address] [place] [address], [facility] [facility]  [phone]

## 2020-10-07 NOTE — LETTER
10/27/2020 8:56 AM 
 
Ms. [address] [facility] [id number] You are due for a follow up appointment at this time. Please call the office so we may schedule a medication follow up appointment and keep your medication refills up to date. [phone] Sincerely, Gilberto Hammer MD

## 2020-10-11 RX ORDER — ALENDRONATE SODIUM 70 MG/1
TABLET ORAL
Qty: 4 TAB | Refills: 0 | Status: SHIPPED | OUTPATIENT
Start: 2020-10-11 | End: 2020-11-06

## 2020-10-19 RX ORDER — DOCUSATE SODIUM 100 MG/1
CAPSULE, LIQUID FILLED ORAL
Qty: 31 CAP | Refills: 11 | Status: SHIPPED | OUTPATIENT
Start: 2020-10-19 | End: 2021-10-12

## 2020-11-02 ENCOUNTER — HOSPITAL ENCOUNTER (OUTPATIENT)
Dept: MAMMOGRAPHY | Age: 70
Discharge: HOME | End: 2020-11-02
Attending: INTERNAL MEDICINE
Payer: MEDICARE

## 2020-11-02 DIAGNOSIS — Z12.31 VISIT FOR SCREENING MAMMOGRAM: ICD-10-CM

## 2020-11-02 PROCEDURE — 77063 BREAST TOMOSYNTHESIS BI: CPT

## 2020-11-06 RX ORDER — ALENDRONATE SODIUM 70 MG/1
TABLET ORAL
Qty: 4 TAB | Refills: 0 | Status: SHIPPED | OUTPATIENT
Start: 2020-11-06 | End: 2020-12-03 | Stop reason: ALTCHOICE

## 2020-12-03 ENCOUNTER — HOSPITAL ENCOUNTER (OUTPATIENT)
Dept: LAB | Age: 70
Discharge: HOME | End: 2020-12-03
Payer: MEDICARE

## 2020-12-03 ENCOUNTER — OFFICE VISIT (OUTPATIENT)
Dept: INTERNAL MEDICINE CLINIC | Age: 70
End: 2020-12-03
Payer: MEDICARE

## 2020-12-03 VITALS
OXYGEN SATURATION: 97 % | DIASTOLIC BLOOD PRESSURE: 72 MMHG | SYSTOLIC BLOOD PRESSURE: 117 MMHG | HEART RATE: 66 BPM | BODY MASS INDEX: 34.31 KG/M2 | TEMPERATURE: 98.7 F | RESPIRATION RATE: 18 BRPM | HEIGHT: 59 IN | WEIGHT: 170.2 LBS

## 2020-12-03 DIAGNOSIS — N89.8 VAGINAL ODOR: ICD-10-CM

## 2020-12-03 DIAGNOSIS — E78.00 HYPERCHOLESTEREMIA: ICD-10-CM

## 2020-12-03 DIAGNOSIS — K21.9 GASTROESOPHAGEAL REFLUX DISEASE WITHOUT ESOPHAGITIS: ICD-10-CM

## 2020-12-03 DIAGNOSIS — F03.918: ICD-10-CM

## 2020-12-03 DIAGNOSIS — K59.09 CHRONIC CONSTIPATION: ICD-10-CM

## 2020-12-03 DIAGNOSIS — M81.6 LOCALIZED OSTEOPOROSIS WITHOUT CURRENT PATHOLOGICAL FRACTURE: Primary | ICD-10-CM

## 2020-12-03 DIAGNOSIS — R35.0 URINE FREQUENCY: ICD-10-CM

## 2020-12-03 LAB
BILIRUB UR QL STRIP: NEGATIVE
GLUCOSE UR-MCNC: NEGATIVE MG/DL
KETONES P FAST UR STRIP-MCNC: NEGATIVE MG/DL
PH UR STRIP: 6 [PH] (ref 4.6–8)
PROT UR QL STRIP: NEGATIVE
SP GR UR STRIP: 1.02 (ref 1–1.03)
UA UROBILINOGEN AMB POC: NORMAL (ref 0.2–1)
URINALYSIS CLARITY POC: CLEAR
URINALYSIS COLOR POC: YELLOW
URINE BLOOD POC: NORMAL
URINE LEUKOCYTES POC: NORMAL
URINE NITRITES POC: NEGATIVE

## 2020-12-03 PROCEDURE — 81001 URINALYSIS AUTO W/SCOPE: CPT

## 2020-12-03 PROCEDURE — G0463 HOSPITAL OUTPT CLINIC VISIT: HCPCS | Performed by: INTERNAL MEDICINE

## 2020-12-03 PROCEDURE — 3017F COLORECTAL CA SCREEN DOC REV: CPT | Performed by: INTERNAL MEDICINE

## 2020-12-03 PROCEDURE — G8417 CALC BMI ABV UP PARAM F/U: HCPCS | Performed by: INTERNAL MEDICINE

## 2020-12-03 PROCEDURE — G9717 DOC PT DX DEP/BP F/U NT REQ: HCPCS | Performed by: INTERNAL MEDICINE

## 2020-12-03 PROCEDURE — 1101F PT FALLS ASSESS-DOCD LE1/YR: CPT | Performed by: INTERNAL MEDICINE

## 2020-12-03 PROCEDURE — G8536 NO DOC ELDER MAL SCRN: HCPCS | Performed by: INTERNAL MEDICINE

## 2020-12-03 PROCEDURE — 99214 OFFICE O/P EST MOD 30 MIN: CPT | Performed by: INTERNAL MEDICINE

## 2020-12-03 PROCEDURE — 81003 URINALYSIS AUTO W/O SCOPE: CPT | Performed by: INTERNAL MEDICINE

## 2020-12-03 PROCEDURE — G8427 DOCREV CUR MEDS BY ELIG CLIN: HCPCS | Performed by: INTERNAL MEDICINE

## 2020-12-03 PROCEDURE — 1090F PRES/ABSN URINE INCON ASSESS: CPT | Performed by: INTERNAL MEDICINE

## 2020-12-03 PROCEDURE — G9899 SCRN MAM PERF RSLTS DOC: HCPCS | Performed by: INTERNAL MEDICINE

## 2020-12-03 RX ORDER — POLYETHYLENE GLYCOL 3350 17 G/17G
POWDER, FOR SOLUTION ORAL
Qty: 527 G | Refills: 1 | Status: SHIPPED | OUTPATIENT
Start: 2020-12-03 | End: 2021-12-11

## 2020-12-03 NOTE — PROGRESS NOTES
Yaritza Daniel is a 69 y.o. female who was seen today for a follow-up visit. Assessment & Plan:     Diagnoses and all orders for this visit:    1. Localized osteoporosis without current pathological fracture  Counseled continue calcium, vitamin D, exercise for bone health. Stop Fosamax as has been on medication greater than 5 years. Repeat bone density study for further evaluation.  -     DEXA BONE DENSITY STUDY AXIAL; Future    2. Gastroesophageal reflux disease without esophagitis  Well-controlled continue current medication. 3. Chronic constipation  MiraLAX as needed  4. 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 Hypercholesteremia  Continue with lifestyle management  6. Urine frequency  Full urinalysis to lab. -     AMB POC URINALYSIS DIP STICK AUTO W/O MICRO  -     UA/M W/RFLX CULTURE, COMP; Future    7. Vaginal odor  No evidence of vaginal infection  Other orders  -     inulin (Fiber Choice, inulin,) 1.5 gram chew; Take 1 Tab by mouth two (2) times a day. -     polyethylene glycol (MIRALAX) 17 gram/dose powder; Mix 1 capful (=17GM) IN A GLASS OF WATER DRINK DAILY AS NEEDED FOR CONSTIPATION. -     UA/M W/RFLX CULTURE, COMP  -     MICROSCOPIC EXAMINATION      Follow-up and Dispositions    · Return in about 3 months (around 3/3/2021). .       Subjective:   Yaritza Daniel was seen for follow-up:   1. Osteoporosis: has been on fosamax greater than 5 years and due for bone density. Taking ca and vit D and walking for exercise. Tolerating medication well with not side effects. 2.  Caretaker notes urinary frequency and urgency. Edith denies any dysuria. 3.  Vaginal odor noted no discharge has been seen and Edith has no complaint in her vaginal area. 4.  Chronic constipation: Has been well controlled with on and off MiraLAX. 5.  GERD: Well controlled and denies any symptoms today.     Review of Systems   Respiratory: Negative for shortness of breath. Cardiovascular: Negative for chest pain and leg swelling. Gastrointestinal: Negative for abdominal pain and heartburn. Genitourinary: Positive for frequency and urgency. Negative for dysuria. Musculoskeletal: Negative for back pain and joint pain. Physical Exam  Vitals signs and nursing note reviewed. Constitutional:       General: She is not in acute distress. Appearance: She is well-developed. HENT:      Head: Normocephalic and atraumatic. Neck:      Vascular: No carotid bruit. Cardiovascular:      Rate and Rhythm: Normal rate and regular rhythm. Heart sounds: Normal heart sounds. Pulmonary:      Effort: Pulmonary effort is normal.      Breath sounds: Normal breath sounds. No wheezing. Abdominal:      General: Bowel sounds are normal. There is no distension. Palpations: Abdomen is soft. There is no mass. Tenderness: There is no abdominal tenderness. Genitourinary:     Comments: Pelvic exam: normal external genitalia, vulva, unable to place speculum due to intact hymen. Attempted digital exam but patient did not tolerate. Vagina not visualized, no discharge is present, no palpable uterus or adnexa. Musculoskeletal:      Right lower leg: No edema. Left lower leg: No edema. Skin:     Findings: No rash. Neurological:      Mental Status: She is alert. Psychiatric:         Mood and Affect: Mood normal.         Behavior: Behavior normal.       Visit Vitals  /72 (BP 1 Location: Right arm, BP Patient Position: Sitting)   Pulse 66   Temp 98.7 °F (37.1 °C) (Temporal)   Resp 18   Ht 4' 11\" (1.499 m)   Wt 170 lb 3.2 oz (77.2 kg)   SpO2 97%   BMI 34.38 kg/m²          Aspects of this note may have been generated using voice recognition software. Despite editing, there may be some syntax errors   I have discussed the diagnosis with the patient and the intended plan as seen in the above orders.   The patient has received an after-visit summary and questions were answered concerning future plans. I have discussed any recommended medication side effects and warnings with the patient as well.   She has expressed understanding of the diagnosis and plan    Hannah Carrillo MD

## 2020-12-04 LAB
APPEARANCE UR: CLEAR
BACTERIA #/AREA URNS HPF: NORMAL /[HPF]
BILIRUB UR QL STRIP: NEGATIVE
CASTS URNS QL MICRO: NORMAL /LPF
COLOR UR: YELLOW
EPI CELLS #/AREA URNS HPF: NORMAL /HPF (ref 0–10)
GLUCOSE UR QL: NEGATIVE
HGB UR QL STRIP: NEGATIVE
KETONES UR QL STRIP: NEGATIVE
LEUKOCYTE ESTERASE UR QL STRIP: NEGATIVE
MICRO URNS: NORMAL
MICRO URNS: NORMAL
MUCOUS THREADS URNS QL MICRO: PRESENT
NITRITE UR QL STRIP: NEGATIVE
PH UR STRIP: 6 [PH] (ref 5–7.5)
PROT UR QL STRIP: NEGATIVE
RBC #/AREA URNS HPF: NORMAL /HPF (ref 0–2)
SP GR UR: 1.02 (ref 1–1.03)
URINALYSIS REFLEX , 377201: NORMAL
UROBILINOGEN UR STRIP-MCNC: 0.2 MG/DL (ref 0.2–1)
WBC #/AREA URNS HPF: NORMAL /HPF (ref 0–5)

## 2020-12-14 NOTE — BH NOTES
PSYCHIATRIC PROGRESS NOTE         Patient Name  Emi Guardado   Date of Birth 1950   [facility] [id number]   Medical Record Number  [id number]      Age  75 y.o. PCP Mary Mitchell MD   Admit date:  2/20/2018    Room Number  727/01  @ [facility]   Date of Service  2/25/2018          PSYCHOTHERAPY SESSION NOTE:  Length of psychotherapy session: 30 minutes    Main condition/diagnosis/issues treated during session today, 2/25/2018 : behavioral control, anger management, treatment compliance    I employed Cognitive Behavioral therapy techniques, Reality-Oriented psychotherapy, as well as supportive psychotherapy in regards to various ongoing psychosocial stressors, including the following: pre-admission and current problems; housing issues;  stress of hospitalization. Interpersonal relationship issues and psychodynamic conflicts explored. Attempts made to alleviate maladaptive patterns. We, also, worked on issues of denial & effects of substance dependency/use     Overall, patient is not progressing    Treatment Plan Update (reviewed an updated 2/25/2018) : I will modify psychotherapy tx plan by implementing more stress management strategies, building upon cognitive behavioral techniques, increasing coping skills, as well as shoring up psychological defenses). An extended energy and skill set was needed to engage pt in psychotherapy due to some of the following: resistiveness, complexity, negativity, confrontational nature, hostile behaviors, and/or severe abnormalities in thought processes/psychosis resulting in the loss of expressive/receptive language communication skills. E & M PROGRESS NOTE:         HISTORY       CC:  \"behavioral problems\"  HISTORY OF PRESENT ILLNESS/INTERVAL HISTORY:  (reviewed/updated 2/25/2018). per initial evaluation:     Emi Guardado presents/reports/evidences the following emotional symptoms today, 2/25/2018:agitation.  The above symptoms have been present for years . These symptoms are of acute severity. The symptoms are intermittent/ fleeting in nature. Additional symptomatology and features include anxiety. 2/23/18- Cheerful and pleasant. Absolutely no aggressive behavior. Is quite redirectable and appropriate with staff and peers. -  2/24/18- Cheerful, pleasant, cooperative. Able to voice strategies to avoif behavioral outbursts. 2/25/18- Very pleasant, med and meal compliant. No aggression, no prn's      SIDE EFFECTS: (reviewed/updated 2/25/2018)  None reported or admitted to. No noted toxicity with use of Depakote/Tegretol/lithium/Clozaril/TCAs   ALLERGIES:(reviewed/updated 2/25/2018)  No Known Allergies   MEDICATIONS PRIOR TO ADMISSION:(reviewed/updated 2/25/2018)  Prescriptions Prior to Admission   Medication Sig    alendronate (FOSAMAX) 70 mg tablet TAKE ONE TABLET WEEKLY ON FRIDAY 30 MINUTES BEFORE 1ST MEAL WITH FULLGLASS OF WATER. DO NOT LIE DOWN FOR 30 MINUTES AFTER TAKING    DOC-Q-LACE 100 mg capsule TAKE (1) CAPSULE BY MOUTH DAILY    cyanocobalamin (VITAMIN B-12) 1,000 mcg tablet TAKE 1 TABLET BY MOUTH DAILY    calcium carbonate (CALTREX) 600 mg calcium (1,500 mg) tablet TAKE 1 TABLET BY MOUTH TWICE A DAY    loratadine (CLARITIN) 10 mg tablet TAKE 1 TABLET BY MOUTH DAILY FOR ALLERGIES    esomeprazole (NEXIUM) 40 mg capsule TAKE (1) CAPSULE BY MOUTH DAILY    ergocalciferol (VITAMIN D2) 50,000 unit capsule TAKE 1 CAPSULE ONCE A WEEK ON FRIDAY.  inulin (FIBER CHOICE, INULIN,) 1.5 gram chew Take 1 Tab by mouth two (2) times a day.  FLUOXETINE HCL (FLUOXETINE PO) Take 60 mg by mouth.  busPIRone (BUSPAR) 15 mg tablet Take 15 mg by mouth three (3) times daily.  polyethylene glycol (MIRALAX) 17 gram/dose powder MIX 1 CAPFUL (=17GM) IN A GLASS OF WATER & DRINK ONCE DAILY AS NEEDEDFOR CONSTIPATION    ALPRAZolam (XANAX) 0.25 mg tablet Take 0.5 mg by mouth three (3) times daily (with meals).     Fiber (FIBER CHOICE) Chew Take 1 Tab by mouth two (2) times a day.  QUEtiapine (SEROQUEL) 25 mg tablet Take 50 mg by mouth two (2) times a day. PAST MEDICAL HISTORY: Past medical history from the initial psychiatric evaluation has been reviewed (reviewed/updated 2/25/2018) with no additional updates (I asked patient and no additional past medical history provided). Past Medical History:   Diagnosis Date    Allergic rhinitis, cause unspecified 7/15/2010    Anxiety     Decreased bone density 07/24/09    Dexa Scan     Detached retina     Diverticulosis     Falls     GERD (gastroesophageal reflux disease)     HTN (hypertension)     Memory disorder     Menorrhagia     Mental retardation     Muscle weakness     Osteopenia     Other and unspecified hyperlipidemia     S/P colonoscopy 03/01/08    Shortness of breath     Snoring     Thrombocytopenia (HCC)      Past Surgical History:   Procedure Laterality Date    COLONOSCOPY  3-5-2008    HX CATARACT REMOVAL      HX OVARIAN CYST REMOVAL      HX POLYPECTOMY      ablation      SOCIAL HISTORY: Social history from the initial psychiatric evaluation has been reviewed (reviewed/updated 2/25/2018) with no additional updates (I asked patient and no additional social history provided). Social History     Social History    Marital status: SINGLE     Spouse name: N/A    Number of children: N/A    Years of education: N/A     Occupational History    Not on file. Social History Main Topics    Smoking status: Never Smoker    Smokeless tobacco: Never Used    Alcohol use No    Drug use: No    Sexual activity: No     Other Topics Concern    Not on file     Social History Narrative    75year old  female ad,itted from group home for peroidic agitation. Pt is ID and also has dementia.        FAMILY HISTORY: Family history from the initial psychiatric evaluation has been reviewed (reviewed/updated 2/25/2018) with no additional updates (I asked patient and no additional family history provided). Family History   Problem Relation Age of Onset    Diabetes Father     Heart Disease Father        REVIEW OF SYSTEMS: (reviewed/updated 2/25/2018)  Appetite:no change from normal   Sleep: no change   All other Review of Systems: Psychological ROS: positive for - behavioral disorder  Respiratory ROS: no cough, shortness of breath, or wheezing  Cardiovascular ROS: no chest pain or dyspnea on exertion         [facility] ([facility]):    MSE FINDINGS ARE WITHIN NORMAL LIMITS (WNL) UNLESS OTHERWISE STATED BELOW. ( ALL OF THE BELOW CATEGORIES OF THE MSE HAVE BEEN REVIEWED (reviewed 2/25/2018) AND UPDATED AS DEEMED APPROPRIATE )  General Presentation age appropriate, cooperative   Orientation Alert and Oriented x 1   Vital Signs  See below (reviewed 2/25/2018); Vital Signs (BP, Pulse, & Temp) are within normal limits if not listed below.    Gait and Station Stable/steady, no ataxia   Musculoskeletal System No extrapyramidal symptoms (EPS); no abnormal muscular movements or Tardive Dyskinesia (TD); muscle strength and tone are within normal limits   Language No aphasia or dysarthria   Speech:  hyperverbal   Thought Processes Primitive  slow rate of thoughts; poor abstract reasoning/computation   Thought Associations circumstantial   Thought Content not internally preoccupied   Suicidal Ideations none   Homicidal Ideations none   Mood:  stable    Affect:  stable   Memory recent  impaired   Memory remote:  impaired   Concentration/Attention:  distractable   Fund of Knowledge significantly below average   Insight:  poor   Reliability poor   Judgment:  poor          VITALS:     Patient Vitals for the past 24 hrs:   Temp Pulse Resp BP SpO2   02/25/18 0801 97.4 °F (36.3 °C) 75 18 149/81 96 %   02/24/18 1936 97.8 °F (36.6 °C) 84 17 125/64 95 %   02/24/18 1525 98.1 °F (36.7 °C) 94 18 119/54 94 %     Wt Readings from Last 3 Encounters:   02/25/18 74.8 kg (165 lb)   12/20/17 79.4 kg (175 lb)   10/30/17 71 kg (156 lb 8.4 oz)     Temp Readings from Last 3 Encounters:   02/25/18 97.4 °F (36.3 °C)   12/20/17 97.5 °F (36.4 °C) (Oral)   08/08/17 98.1 °F (36.7 °C) (Oral)     BP Readings from Last 3 Encounters:   02/25/18 149/81   12/20/17 110/64   10/30/17 110/64     Pulse Readings from Last 3 Encounters:   02/25/18 75   12/20/17 60   10/30/17 (!) 57            DATA     LABORATORY DATA:(reviewed/updated 2/25/2018)  No results found for this or any previous visit (from the past 24 hour(s)). No results found for: VALF2, VALAC, VALP, VALPR, DS6, CRBAM, CRBAMP, CARB2, XCRBAM  No results found for: LITHM   RADIOLOGY REPORTS:(reviewed/updated 2/25/2018)  No results found.        MEDICATIONS     ALL MEDICATIONS:   Current Facility-Administered Medications   Medication Dose Route Frequency    folic acid (FOLVITE) tablet 1 mg  1 mg Oral DAILY    thiamine (B-1) tablet 100 mg  100 mg Oral DAILY    LORazepam (ATIVAN) tablet 2 mg  2 mg Oral Q1H PRN    LORazepam (ATIVAN) tablet 4 mg  4 mg Oral Q1H PRN    hydrOXYzine HCl (ATARAX) tablet 25 mg  25 mg Oral TID    LORazepam (ATIVAN) tablet 0.5 mg  0.5 mg Oral BID PRN    FLUoxetine (PROzac) capsule 60 mg  60 mg Oral DAILY    QUEtiapine (SEROquel) tablet 50 mg  50 mg Oral TID    busPIRone (BUSPAR) tablet 20 mg  20 mg Oral TID    ziprasidone (GEODON) 20 mg in sterile water (preservative free) 1 mL injection  20 mg IntraMUSCular BID PRN    OLANZapine (ZyPREXA) tablet 5 mg  5 mg Oral Q6H PRN    benztropine (COGENTIN) tablet 2 mg  2 mg Oral BID PRN    benztropine (COGENTIN) injection 2 mg  2 mg IntraMUSCular BID PRN    LORazepam (ATIVAN) injection 2 mg  2 mg IntraMUSCular Q4H PRN    acetaminophen (TYLENOL) tablet 650 mg  650 mg Oral Q4H PRN    ibuprofen (MOTRIN) tablet 400 mg  400 mg Oral Q8H PRN    magnesium hydroxide (MILK OF MAGNESIA) 400 mg/5 mL oral suspension 30 mL  30 mL Oral DAILY PRN    zolpidem (AMBIEN) tablet 5 mg  5 mg Oral QHS PRN      SCHEDULED MEDICATIONS:   Current Facility-Administered Medications   Medication Dose Route Frequency    folic acid (FOLVITE) tablet 1 mg  1 mg Oral DAILY    thiamine (B-1) tablet 100 mg  100 mg Oral DAILY    hydrOXYzine HCl (ATARAX) tablet 25 mg  25 mg Oral TID    FLUoxetine (PROzac) capsule 60 mg  60 mg Oral DAILY    QUEtiapine (SEROquel) tablet 50 mg  50 mg Oral TID    busPIRone (BUSPAR) tablet 20 mg  20 mg Oral TID          ASSESSMENT & PLAN     DIAGNOSES REQUIRING ACTIVE TREATMENT AND MONITORING: (reviewed/updated 2/25/2018)  Patient Active Hospital Problem List:   Dementia with behavioral disturbance (7/13/2017)    Assessment: cognitive, memory, verbal decline, ischemic vascular disease    Plan: namenda   Developmental delay (11/23/2015)    Assessment: intellectual functioning far behind chronological age    Plan: supportive psychotherapy            I will continue to monitor blood levels (Depakote, Tegretol, lithium, clozapine---a drug with a narrow therapeutic index= NTI) and associated labs for drug therapy implemented that require intense monitoring for toxicity as deemed appropriate based on current medication side effects and pharmacodynamically determined drug 1/2 lives. In summary, Cara Sims, is a 75 y.o.  female who presents with a severe exacerbation of the principal diagnosis of Dementia with behavioral disturbance  Patient's condition is worsening/not improving/not stable . Patient requires continued inpatient hospitalization for further stabilization, safety monitoring and medication management. I will continue to coordinate the provision of individual, milieu, occupational, group, and substance abuse therapies to address target symptoms/diagnoses as deemed appropriate for the individual patient.   A coordinated, multidisplinary treatment team round was conducted with the patient (this team consists of the nurse, psychiatric unit pharmcist,  and writer). Complete current electronic health record for patient has been reviewed today including consultant notes, ancillary staff notes, nurses and psychiatric tech notes. Suicide risk assessment completed and patient deemed to be of low risk for suicide at this time. The following regarding medications was addressed during rounds with patient:   the risks and benefits of the proposed medication. The patient was given the opportunity to ask questions. Informed consent given to the use of the above medications. Will continue to adjust psychiatric and non-psychiatric medications (see above \"medication\" section and orders section for details) as deemed appropriate & based upon diagnoses and response to treatment. I will continue to order blood tests/labs and diagnostic tests as deemed appropriate and review results as they become available (see orders for details and above listed lab/test results). I will order psychiatric records from previous [facility] hospitals to further elucidate the nature of patient's psychopathology and review once available. I will gather additional collateral information from friends, family and o/p treatment team to further elucidate the nature of patient's psychopathology and baselline level of psychiatric functioning. I certify that this patient's inpatient psychiatric hospital services furnished since the previous certification were, and continue to be, required for treatment that could reasonably be expected to improve the patient's condition, or for diagnostic study, and that the patient continues to need, on a daily basis, active treatment furnished directly by or requiring the supervision of inpatient psychiatric facility personnel. In addition, the hospital records show that services furnished were intensive treatment services, admission or related services, or equivalent services.     EXPECTED DISCHARGE DATE/DAY: TBD     DISPOSITION: Home       Signed By: Miriam Fink MD  2/25/2018 [place]

## 2021-01-25 ENCOUNTER — TELEPHONE (OUTPATIENT)
Dept: INTERNAL MEDICINE CLINIC | Age: 71
End: 2021-01-25

## 2021-01-25 NOTE — TELEPHONE ENCOUNTER
Calling to ask if you received the form for patient's physical that needed to be signed -- has been sent 3 times.       Fax# [phone]

## 2021-01-27 ENCOUNTER — DOCUMENTATION ONLY (OUTPATIENT)
Dept: INTERNAL MEDICINE CLINIC | Age: 71
End: 2021-01-27

## 2021-01-27 NOTE — PROGRESS NOTES
[facility] ([address]) @ [phone], confirmation received.

## 2021-03-16 ENCOUNTER — OFFICE VISIT (OUTPATIENT)
Dept: INTERNAL MEDICINE CLINIC | Age: 71
End: 2021-03-16
Payer: MEDICARE

## 2021-03-16 VITALS
OXYGEN SATURATION: 95 % | HEART RATE: 64 BPM | SYSTOLIC BLOOD PRESSURE: 118 MMHG | DIASTOLIC BLOOD PRESSURE: 80 MMHG | HEIGHT: 59 IN | TEMPERATURE: 97.3 F | RESPIRATION RATE: 17 BRPM | WEIGHT: 172.6 LBS | BODY MASS INDEX: 34.8 KG/M2

## 2021-03-16 DIAGNOSIS — B37.2 YEAST INFECTION OF THE SKIN: ICD-10-CM

## 2021-03-16 DIAGNOSIS — M81.0 OSTEOPOROSIS WITHOUT CURRENT PATHOLOGICAL FRACTURE, UNSPECIFIED OSTEOPOROSIS TYPE: Primary | ICD-10-CM

## 2021-03-16 DIAGNOSIS — K21.9 GASTROESOPHAGEAL REFLUX DISEASE WITHOUT ESOPHAGITIS: ICD-10-CM

## 2021-03-16 PROCEDURE — 99214 OFFICE O/P EST MOD 30 MIN: CPT | Performed by: INTERNAL MEDICINE

## 2021-03-16 PROCEDURE — G9899 SCRN MAM PERF RSLTS DOC: HCPCS | Performed by: INTERNAL MEDICINE

## 2021-03-16 PROCEDURE — G8536 NO DOC ELDER MAL SCRN: HCPCS | Performed by: INTERNAL MEDICINE

## 2021-03-16 PROCEDURE — G0463 HOSPITAL OUTPT CLINIC VISIT: HCPCS | Performed by: INTERNAL MEDICINE

## 2021-03-16 PROCEDURE — G9717 DOC PT DX DEP/BP F/U NT REQ: HCPCS | Performed by: INTERNAL MEDICINE

## 2021-03-16 PROCEDURE — 1101F PT FALLS ASSESS-DOCD LE1/YR: CPT | Performed by: INTERNAL MEDICINE

## 2021-03-16 PROCEDURE — 3017F COLORECTAL CA SCREEN DOC REV: CPT | Performed by: INTERNAL MEDICINE

## 2021-03-16 PROCEDURE — G8417 CALC BMI ABV UP PARAM F/U: HCPCS | Performed by: INTERNAL MEDICINE

## 2021-03-16 PROCEDURE — 1090F PRES/ABSN URINE INCON ASSESS: CPT | Performed by: INTERNAL MEDICINE

## 2021-03-16 PROCEDURE — G8427 DOCREV CUR MEDS BY ELIG CLIN: HCPCS | Performed by: INTERNAL MEDICINE

## 2021-03-16 RX ORDER — MENTHOL 0 G/G
POWDER TOPICAL
Qty: 283 G | Refills: 2 | Status: SHIPPED | OUTPATIENT
Start: 2021-03-16 | End: 2022-08-19 | Stop reason: SDUPTHER

## 2021-03-16 RX ORDER — NYSTATIN 100000 U/G
CREAM TOPICAL 2 TIMES DAILY
Qty: 30 G | Refills: 2 | Status: SHIPPED | OUTPATIENT
Start: 2021-03-16 | End: 2022-04-06

## 2021-03-16 NOTE — PROGRESS NOTES
Hardeep Watson is a 69 y.o. female who was seen today for a follow-up visit. Assessment & Plan:   Diagnoses and all orders for this visit:    1. Osteoporosis without current pathological fracture, unspecified osteoporosis type  Assessment & Plan:  Due for repeat bone density which is ordered today. She has received 5 years of Fosamax therapy. Continue with calcium and vitamin D and exercise. Orders:  -     DEXA BONE DENSITY STUDY AXIAL; Future    2. Yeast infection of the skin  Assessment & Plan:  Apply nystatin cream twice daily as needed under breast as needed. 3. Gastroesophageal reflux disease without esophagitis  Assessment & Plan:  Well-controlled continue current medication. Continue GERD precautions. Other orders  -     menthol-zinc oxide (Gold Bond Medicated) 0.15-1 % powder; Apply to skin twice a day as need. -     nystatin (MYCOSTATIN) topical cream; Apply  to affected area two (2) times a day. As needed      . Subjective:   Hardeep Watson was seen for Osteoporosis and Rash (under bilateral breast), and GERD. 1.  Osteoporosis:  Taking calcium and vit D,  last bone density reviewed. No falls. Exercise: Walking. Has had greater than 5 years of bisphosphonate therapy. 2.  Frequently gets a red itchy rash under her breast.  Has been using Goldbond powder as needed. 3.GERD: Taking medication daily and if misses dose has breakthrough gerd symptoms. Following gerd precautions. Review of Systems   Respiratory: Negative for shortness of breath. Cardiovascular: Negative for chest pain and leg swelling. Gastrointestinal: Negative for abdominal pain. - per HPI    Physical Exam  Vitals signs and nursing note reviewed. Constitutional:       General: She is not in acute distress. Appearance: She is well-developed. HENT:      Head: Normocephalic and atraumatic. Cardiovascular:      Rate and Rhythm: Normal rate and regular rhythm.    Pulmonary: Effort: Pulmonary effort is normal.      Breath sounds: Normal breath sounds. Chest:          Comments: Erythematous linear confluence of papules bilateral breast.  Abdominal:      General: Bowel sounds are normal.      Palpations: Abdomen is soft. Tenderness: There is no abdominal tenderness. Visit Vitals  /80 (BP 1 Location: Right arm, BP Patient Position: Sitting, BP Cuff Size: Large adult)   Pulse 64   Temp 97.3 °F (36.3 °C) (Temporal)   Resp 17   Ht 4' 11\" (1.499 m)   Wt 172 lb 9.6 oz (78.3 kg)   SpO2 95%   BMI 34.86 kg/m²          Aspects of this note may have been generated using voice recognition software. Despite editing, there may be some syntax errors   I have discussed the diagnosis with the patient and the intended plan as seen in the above orders. The patient has received an after-visit summary and questions were answered concerning future plans. I have discussed any recommended medication side effects and warnings with the patient as well.   She has expressed understanding of the diagnosis and plan    Jefry Arias MD

## 2021-03-17 PROBLEM — B37.2 YEAST INFECTION OF THE SKIN: Status: ACTIVE | Noted: 2021-03-17

## 2021-03-17 NOTE — ASSESSMENT & PLAN NOTE
Due for repeat bone density which is ordered today. She has received 5 years of Fosamax therapy. Continue with calcium and vitamin D and exercise.

## 2021-03-24 ENCOUNTER — TELEPHONE (OUTPATIENT)
Dept: INTERNAL MEDICINE CLINIC | Age: 71
End: 2021-03-24

## 2021-03-24 DIAGNOSIS — M81.0 AGE-RELATED OSTEOPOROSIS WITHOUT CURRENT PATHOLOGICAL FRACTURE: Primary | ICD-10-CM

## 2021-03-24 NOTE — TELEPHONE ENCOUNTER
Spoke with Nancy with [address] who explained patient in recently and was instructed to stop Fosamax until Bone Density completed. Need order to discontinue and fax. Provided scheduling # to assist patient.

## 2021-03-24 NOTE — TELEPHONE ENCOUNTER
Trina Hollins at [facility] [phone]     Needs d/c order to stop fosomax for patient -- Dr. Cutler has already discontinued the med, but they need an order.     Fax# [phone]

## 2021-03-25 ENCOUNTER — TELEPHONE (OUTPATIENT)
Dept: INTERNAL MEDICINE CLINIC | Age: 71
End: 2021-03-25

## 2021-03-25 ENCOUNTER — DOCUMENTATION ONLY (OUTPATIENT)
Dept: INTERNAL MEDICINE CLINIC | Age: 71
End: 2021-03-25

## 2021-03-25 DIAGNOSIS — M81.0 OSTEOPOROSIS WITHOUT CURRENT PATHOLOGICAL FRACTURE, UNSPECIFIED OSTEOPOROSIS TYPE: Primary | ICD-10-CM

## 2021-03-25 NOTE — PROGRESS NOTES
Faxed order per provider to Hold vitamins morning of bone density procedure and resume after bone density study done. Fax [phone]: [facility]. Confirmation received.

## 2021-03-25 NOTE — TELEPHONE ENCOUNTER
lenny monroy with [facility] [phone]     Fax [phone]  Att: Cloteal Gravel that pt is scheduled to have a bone density test on 3/31/21 and needs to stop all vitamins the morning of this procedure.   They need documentation to that effect

## 2021-04-05 ENCOUNTER — HOSPITAL ENCOUNTER (OUTPATIENT)
Dept: BONE DENSITY | Age: 71
Discharge: HOME | End: 2021-04-05
Attending: INTERNAL MEDICINE
Payer: MEDICARE

## 2021-04-05 DIAGNOSIS — M81.0 OSTEOPOROSIS WITHOUT CURRENT PATHOLOGICAL FRACTURE, UNSPECIFIED OSTEOPOROSIS TYPE: ICD-10-CM

## 2021-04-05 PROCEDURE — 77080 DXA BONE DENSITY AXIAL: CPT

## 2021-04-13 DIAGNOSIS — F03.90 DEMENTIA WITHOUT BEHAVIORAL DISTURBANCE, UNSPECIFIED DEMENTIA TYPE: ICD-10-CM

## 2021-04-13 DIAGNOSIS — R62.50 DEVELOPMENTAL DELAY: ICD-10-CM

## 2021-04-13 RX ORDER — GALANTAMINE 4 MG/1
TABLET, FILM COATED ORAL
Qty: 60 TAB | Refills: 12 | Status: SHIPPED | OUTPATIENT
Start: 2021-04-13 | End: 2022-04-11

## 2021-04-14 RX ORDER — ESOMEPRAZOLE MAGNESIUM 40 MG/1
CAPSULE, DELAYED RELEASE ORAL
Qty: 30 CAP | Refills: 11 | Status: SHIPPED | OUTPATIENT
Start: 2021-04-14 | End: 2022-04-12

## 2021-04-14 RX ORDER — CALCIUM CARBONATE 600 MG
TABLET ORAL
Qty: 60 TAB | Refills: 11 | Status: SHIPPED | OUTPATIENT
Start: 2021-04-14 | End: 2022-04-12

## 2021-04-30 RX ORDER — ALENDRONATE SODIUM 70 MG/1
TABLET ORAL
Qty: 4 TAB | Refills: 5 | OUTPATIENT
Start: 2021-04-30

## 2021-05-07 ENCOUNTER — TELEPHONE (OUTPATIENT)
Dept: INTERNAL MEDICINE CLINIC | Age: 71
End: 2021-05-07

## 2021-05-07 NOTE — TELEPHONE ENCOUNTER
toshia at [facility]  [phone]     [facility] if pt still needs to be on fosamax, if so will need a rx sent to [facility] for her.

## 2021-05-07 NOTE — TELEPHONE ENCOUNTER
Spoke with [facility]. She is asking if patient should resume Fosamax. Advised per Bone Density result note. She need documentation to stop medication per provider faxed to [phone].

## 2021-05-11 NOTE — TELEPHONE ENCOUNTER
Spoke with Stefania Cunha with [facility], informed fax sent indicating to stop Fosamax. She confirmed received.

## 2021-09-14 RX ORDER — LANOLIN ALCOHOL/MO/W.PET/CERES
CREAM (GRAM) TOPICAL
Qty: 60 TABLET | Refills: 11 | Status: SHIPPED | OUTPATIENT
Start: 2021-09-14 | End: 2022-09-14

## 2021-10-12 DIAGNOSIS — Z79.899 MEDICATION MANAGEMENT: Primary | ICD-10-CM

## 2021-10-12 RX ORDER — DOCUSATE SODIUM 100 MG/1
CAPSULE, LIQUID FILLED ORAL
Qty: 31 CAPSULE | Refills: 11 | Status: SHIPPED | OUTPATIENT
Start: 2021-10-12 | End: 2022-10-25

## 2021-10-12 NOTE — PROGRESS NOTES
Faxed amb order to d/c nystatin order per provider. Sent to  [place] [facility] [employer], confirmation received.

## 2021-10-19 ENCOUNTER — TRANSCRIBE ORDER (OUTPATIENT)
Dept: SCHEDULING | Age: 71
End: 2021-10-19

## 2021-10-19 DIAGNOSIS — Z12.31 VISIT FOR SCREENING MAMMOGRAM: Primary | ICD-10-CM

## 2021-11-10 ENCOUNTER — HOSPITAL ENCOUNTER (OUTPATIENT)
Dept: MAMMOGRAPHY | Age: 71
Discharge: HOME | End: 2021-11-10
Attending: INTERNAL MEDICINE
Payer: MEDICARE

## 2021-11-10 DIAGNOSIS — Z12.31 VISIT FOR SCREENING MAMMOGRAM: ICD-10-CM

## 2021-11-10 PROCEDURE — 77067 SCR MAMMO BI INCL CAD: CPT

## 2021-11-10 NOTE — PROGRESS NOTES
Mammogram reviewed and is negative. Radiology department has sent notification of normal mammogram results.

## 2021-11-23 ENCOUNTER — OFFICE VISIT (OUTPATIENT)
Dept: INTERNAL MEDICINE CLINIC | Age: 71
End: 2021-11-23
Payer: MEDICARE

## 2021-11-23 VITALS
SYSTOLIC BLOOD PRESSURE: 105 MMHG | RESPIRATION RATE: 16 BRPM | HEIGHT: 59 IN | DIASTOLIC BLOOD PRESSURE: 74 MMHG | BODY MASS INDEX: 35.52 KG/M2 | TEMPERATURE: 97.1 F | WEIGHT: 176.2 LBS | OXYGEN SATURATION: 100 % | HEART RATE: 65 BPM

## 2021-11-23 DIAGNOSIS — B37.2 YEAST INFECTION OF THE SKIN: ICD-10-CM

## 2021-11-23 DIAGNOSIS — F33.42 RECURRENT MAJOR DEPRESSIVE DISORDER, IN FULL REMISSION (HCC): ICD-10-CM

## 2021-11-23 DIAGNOSIS — E55.9 VITAMIN D DEFICIENCY: ICD-10-CM

## 2021-11-23 DIAGNOSIS — K21.9 GASTROESOPHAGEAL REFLUX DISEASE WITHOUT ESOPHAGITIS: ICD-10-CM

## 2021-11-23 DIAGNOSIS — Z00.00 MEDICARE ANNUAL WELLNESS VISIT, SUBSEQUENT: ICD-10-CM

## 2021-11-23 DIAGNOSIS — R35.0 FREQUENCY OF URINATION: ICD-10-CM

## 2021-11-23 DIAGNOSIS — E78.00 HYPERCHOLESTEREMIA: ICD-10-CM

## 2021-11-23 DIAGNOSIS — F03.918: ICD-10-CM

## 2021-11-23 DIAGNOSIS — M81.0 AGE-RELATED OSTEOPOROSIS WITHOUT CURRENT PATHOLOGICAL FRACTURE: Primary | ICD-10-CM

## 2021-11-23 LAB
BILIRUB UR QL STRIP: NEGATIVE
GLUCOSE UR-MCNC: NEGATIVE MG/DL
KETONES P FAST UR STRIP-MCNC: NORMAL MG/DL
PH UR STRIP: 5.5 [PH] (ref 4.6–8)
PROT UR QL STRIP: NEGATIVE
SP GR UR STRIP: 1.03 (ref 1–1.03)
UA UROBILINOGEN AMB POC: NORMAL (ref 0.2–1)
URINALYSIS CLARITY POC: CLEAR
URINALYSIS COLOR POC: YELLOW
URINE BLOOD POC: NORMAL
URINE LEUKOCYTES POC: NORMAL
URINE NITRITES POC: NEGATIVE

## 2021-11-23 PROCEDURE — 81002 URINALYSIS NONAUTO W/O SCOPE: CPT | Performed by: INTERNAL MEDICINE

## 2021-11-23 PROCEDURE — 1090F PRES/ABSN URINE INCON ASSESS: CPT | Performed by: INTERNAL MEDICINE

## 2021-11-23 PROCEDURE — G8417 CALC BMI ABV UP PARAM F/U: HCPCS | Performed by: INTERNAL MEDICINE

## 2021-11-23 PROCEDURE — G9899 SCRN MAM PERF RSLTS DOC: HCPCS | Performed by: INTERNAL MEDICINE

## 2021-11-23 PROCEDURE — 99214 OFFICE O/P EST MOD 30 MIN: CPT | Performed by: INTERNAL MEDICINE

## 2021-11-23 PROCEDURE — G0463 HOSPITAL OUTPT CLINIC VISIT: HCPCS | Performed by: INTERNAL MEDICINE

## 2021-11-23 PROCEDURE — G9717 DOC PT DX DEP/BP F/U NT REQ: HCPCS | Performed by: INTERNAL MEDICINE

## 2021-11-23 PROCEDURE — G8536 NO DOC ELDER MAL SCRN: HCPCS | Performed by: INTERNAL MEDICINE

## 2021-11-23 PROCEDURE — 1101F PT FALLS ASSESS-DOCD LE1/YR: CPT | Performed by: INTERNAL MEDICINE

## 2021-11-23 PROCEDURE — G0439 PPPS, SUBSEQ VISIT: HCPCS | Performed by: INTERNAL MEDICINE

## 2021-11-23 PROCEDURE — 3017F COLORECTAL CA SCREEN DOC REV: CPT | Performed by: INTERNAL MEDICINE

## 2021-11-23 PROCEDURE — G8427 DOCREV CUR MEDS BY ELIG CLIN: HCPCS | Performed by: INTERNAL MEDICINE

## 2021-11-23 NOTE — PROGRESS NOTES
This is the Subsequent Medicare Annual Wellness Exam, performed 12 months or more after the Initial AWV or the last Subsequent AWV    I have reviewed the patient's medical history in detail and updated the computerized patient record. Assessment/Plan   Education and counseling provided:  Are appropriate based on today's review and evaluation  Subsequent Medicare wellness visit    Depression Risk Factor Screening     3 most recent PHQ Screens 11/23/2021   Little interest or pleasure in doing things Not at all   Feeling down, depressed, irritable, or hopeless Not at all   Total Score PHQ 2 0       Alcohol Risk Screen    Do you average more than 1 drink per night or more than 7 drinks a week:  No    On any one occasion in the past three months have you have had more than 3 drinks containing alcohol:  No        Functional Ability and Level of Safety    Hearing: Hearing is good. Activities of Daily Living: The home contains: no safety equipment. Patient needs help with:  transportation, shopping, preparing meals and managing medications      Ambulation: with no difficulty     Fall Risk:  Fall Risk Assessment, last 12 mths 11/23/2021   Able to walk? Yes   Fall in past 12 months? 0   Do you feel unsteady? 0   Are you worried about falling 0   Number of falls in past 12 months -      Abuse Screen:  Patient is not abused       Cognitive Screening    Has your family/caregiver stated any concerns about your memory:      Cognitive Screening: dementia and followed by neurology    Health Maintenance Due   There are no preventive care reminders to display for this patient.     Patient Care Team   Patient Care Team:  Lien Santana MD as PCP - [facility] Commodore Yang Márquez MD as PCP - REHABILITATION [facility] Empaneled Provider  Basilio Rodriguez MD as Physician (Ophthalmology)  Kimani Mccullough DPM as Physician ([facility] Kawasaki)  Sagar Davila MD (Neurology)  Chinedu Yee MD (Psychiatry)    History     Patient Active Problem List Diagnosis Code    Allergic rhinitis J30.9    Hypercholesteremia E78.00    Depression F32. 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) F03.91    Yeast infection of the skin B37.2     Past Medical History:   Diagnosis Date    Allergic rhinitis, cause unspecified 7/15/2010    Anxiety     Detached retina     Diverticulosis     Fracture     left wrist fx & surgery; pt fell     GERD (gastroesophageal reflux disease)     HTN (hypertension)     Memory disorder     Mental retardation     Osteoporosis 8/5/2016    Other and unspecified hyperlipidemia     S/P colonoscopy 03/01/08    Snoring     Thrombocytopenia (HCC)       Past Surgical History:   Procedure Laterality Date    COLONOSCOPY  3-5-2008    HX CATARACT REMOVAL      HX OVARIAN CYST REMOVAL      HX POLYPECTOMY      ablation     Current Outpatient Medications   Medication Sig Dispense Refill    docusate sodium (COLACE) 100 mg capsule TAKE 1 CAPSULE BY MOUTH DAILY 31 Capsule 11    cyanocobalamin (VITAMIN B12) 500 mcg tablet TAKE (2) TABLETS (=1000mcg) BY MOUTH DAILY. 60 Tablet 11    calcium carbonate (CALTREX) 600 mg calcium (1,500 mg) tablet TAKE 1 TABLET BY MOUTH TWICE A DAY 60 Tab 11    esomeprazole (NEXIUM) 40 mg capsule TAKE 1 CAPSULE BY MOUTH DAILY 30 Cap 11    Vitamin D2 1,250 mcg (50,000 unit) capsule TAKE 1 CAPSULE ONCE A WEEK ON FRIDAY. 4 Cap 12    inulin (Fiber Choice, inulin,) 1.5 gram chew Take 1 Tab by mouth two (2) times a day. 60 Tab 11    hydrOXYzine HCl (ATARAX) 25 mg tablet Take  by mouth three (3) times daily as needed for Itching.  QUEtiapine (SEROQUEL) 50 mg tablet Take 1 Tab by mouth three (3) times daily. Indications: BIPOLAR DISORDER IN REMISSION 90 Tab 0    FLUoxetine (PROZAC) 20 mg capsule Take 3 Caps by mouth daily.  Indications: ANXIETY WITH DEPRESSION 90 Cap 0    busPIRone (BUSPAR) 10 mg tablet Take 2 Tabs by mouth three (3) times daily. Indications: Generalized Anxiety Disorder (Patient taking differently: Take 20 mg by mouth two (2) times a day. Indications: repeated episodes of anxiety) 90 Tab 0    galantamine (RAZADYNE) 4 mg tablet TAKE 1 TABLET BY MOUTH TWICE A DAY 60 Tab 12    menthol-zinc oxide (Gold Bond Medicated) 0.15-1 % powder Apply to skin twice a day as need. 283 g 2    nystatin (MYCOSTATIN) topical cream Apply  to affected area two (2) times a day. As needed 30 g 2    polyethylene glycol (MIRALAX) 17 gram/dose powder Mix 1 capful (=17GM) IN A GLASS OF WATER DRINK DAILY AS NEEDED FOR CONSTIPATION. 527 g 1    loratadine (CLARITIN) 10 mg tablet Take 1 Tab by mouth daily as needed for Allergies. 30 Tab PRN     No Known Allergies    Family History   Problem Relation Age of Onset    Diabetes Father     Heart Disease Father      Social History     Tobacco Use    Smoking status: Never Smoker    Smokeless tobacco: Never Used   Substance Use Topics    Alcohol use: No         Jessica Montaño MD     Liv Pennington is a 66 y.o. female who was also seen today for a follow-up visit. Assessment & Plan:   1. Age-related osteoporosis without current pathological fracture  Fosamax recently stopped. Reviewed bone density. s/p 4 years on fosamax. 2. Gastroesophageal reflux disease without esophagitis  Well controlled, continue current medication. 3. Hypercholesteremia  Continue healthy low fat low cholesterol diet. -     CBC WITH AUTOMATED DIFF; Future  -     LIPID PANEL; Future  -     METABOLIC PANEL, COMPREHENSIVE; Future  4. Yeast infection of the skin  Nystatin prn  5. 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 6. Vitamin D deficiency  -     VITAMIN D, 25 HYDROXY; Future  7.  Recurrent major depressive disorder, in full remission ([facility] [address]  Controlled on current regimen by psychiatry. 8. Medicare annual wellness visit, subsequent  Health maintenance reviewed and updated with patient today at visit. See form completed for patient's living facility. Scanned to chart. Subjective:   Tamela Blake was also seen for osteoporosis, GERD, hypercholesterolemia, recurrent skin yeast infection, dementia with behavioral disturbance, vitamin D deficiency, depression. She is accompanied by . She lives in a group home. She has been noted to have some increased urinary incontinence recently. She denies any urinary burning. They have also had some issues with yeast infections under her breast which are responded well to nystatin cream and powder. She is taking medications with no side effects. She is due to follow-up with neurology regarding her dementia and is currently on galantamine. Psychiatry following her for depression and anxiety which has been controlled on her current regimen of Seroquel Prozac BuSpar. Review of Systems   Constitutional: Negative for fever. HENT: Negative for congestion and sore throat. Respiratory: Negative for shortness of breath. Cardiovascular: Negative for chest pain and leg swelling. Gastrointestinal: Negative for abdominal pain, blood in stool, constipation, diarrhea and heartburn. Genitourinary: Negative for dysuria. Neurological: Negative for dizziness. Psychiatric/Behavioral: Negative for depression.    - per HPI    Physical Exam  Vitals and nursing note reviewed. Constitutional:       General: She is not in acute distress. Appearance: She is well-developed. HENT:      Head: Normocephalic and atraumatic. Cardiovascular:      Rate and Rhythm: Normal rate and regular rhythm. Pulmonary:      Effort: Pulmonary effort is normal.      Breath sounds: Normal breath sounds.    Chest:          Comments: Erythematous linear confluence of papules bilateral breast.  Abdominal:      General: Bowel sounds are normal.      Palpations: Abdomen is soft. Tenderness: There is no abdominal tenderness. Musculoskeletal:      Right lower leg: No edema. Left lower leg: No edema. Neurological:      General: No focal deficit present. Mental Status: She is alert. Psychiatric:         Mood and Affect: Mood normal.         Behavior: Behavior normal.       Visit Vitals  /74 (BP 1 Location: Right arm, BP Patient Position: Sitting, BP Cuff Size: Large adult)   Pulse 65   Temp 97.1 °F (36.2 °C) (Temporal)   Resp 16   Ht 4' 11\" (1.499 m)   Wt 176 lb 3.2 oz (79.9 kg)   SpO2 100%   BMI 35.59 kg/m²        Aspects of this note may have been generated using voice recognition software. Despite editing, there may be some syntax errors   I have discussed the diagnosis with the patient and the intended plan as seen in the above orders. The patient has received an after-visit summary and questions were answered concerning future plans. I have discussed any recommended medication side effects and warnings with the patient as well.   She has expressed understanding of the diagnosis and plan    Moy Andrea MD

## 2021-11-23 NOTE — PROGRESS NOTES
Chief Complaint   Patient presents with   [facility] Annual Wellness Visit       1. Have you been to the ER, urgent care clinic since your last visit? Hospitalized since your last visit? Yes When: 11/2021 Where: [facility]  Reason for visit: Breast issues    2. Have you seen or consulted any other health care providers outside of the [address] [place] [address] since your last visit? Include any pap smears or colon screening.  No'

## 2021-11-23 NOTE — PATIENT INSTRUCTIONS
Vaccine Information Statement    Influenza (Flu) Vaccine (Inactivated or Recombinant): What You Need to Know    Many vaccine information statements are available in French and other languages. See www.immunize.org/vis. Hojas de información sobre vacunas están disponibles en español y en muchos otros idiomas. Visite www.immunize.org/vis. 1. Why get vaccinated? Influenza vaccine can prevent influenza (flu). Flu is a contagious disease that spreads around the [facility] every year, usually between October and May. Anyone can get the flu, but it is more dangerous for some people. Infants and young children, people 72 years and older, pregnant people, and people with certain health conditions or a weakened immune system are at greatest risk of flu complications. Pneumonia, bronchitis, sinus infections, and ear infections are examples of flu-related complications. If you have a medical condition, such as heart disease, cancer, or diabetes, flu can make it worse. Flu can cause fever and chills, sore throat, muscle aches, fatigue, cough, headache, and runny or stuffy nose. Some people may have vomiting and diarrhea, though this is more common in children than adults. In an average year, thousands of people in the [facility] die from flu, and many more are hospitalized. Flu vaccine prevents millions of illnesses and flu-related visits to the doctor each year. 2. Influenza vaccines     CDC recommends everyone 6 months and older get vaccinated every flu season. Children 6 months through 6years of age may need 2 doses during a single flu season. Everyone else needs only 1 dose each flu season. It takes about 2 weeks for protection to develop after vaccination. There are many flu viruses, and they are always changing. Each year a new flu vaccine is made to protect against the influenza viruses believed to be likely to cause disease in the upcoming flu season.  Even when the vaccine doesnt exactly match these viruses, it may still provide some protection. Influenza vaccine does not cause flu. Influenza vaccine may be given at the same time as other vaccines. 3. Talk with your health care provider    Tell your vaccination provider if the person getting the vaccine:   Has had an allergic reaction after a previous dose of influenza vaccine, or has any severe, life-threatening allergies    Has ever had Guillain-Barré Syndrome (also called GBS)    In some cases, your health care provider may decide to postpone influenza vaccination until a future visit. Influenza vaccine can be administered at any time during pregnancy. People who are or will be pregnant during influenza season should receive inactivated influenza vaccine. People with minor illnesses, such as a cold, may be vaccinated. People who are moderately or severely ill should usually wait until they recover before getting influenza vaccine. Your health care provider can give you more information. 4. Risks of a vaccine reaction     Soreness, redness, and swelling where the shot is given, fever, muscle aches, and headache can happen after influenza vaccination.  There may be a very small increased risk of Guillain-Barré Syndrome (GBS) after inactivated influenza vaccine (the flu shot). [facility] children who get the flu shot along with pneumococcal vaccine (PCV13) and/or DTaP vaccine at the same time might be slightly more likely to have a seizure caused by fever. Tell your health care provider if a child who is getting flu vaccine has ever had a seizure. People sometimes faint after medical procedures, including vaccination. Tell your provider if you feel dizzy or have vision changes or ringing in the ears. As with any medicine, there is a very remote chance of a vaccine causing a severe allergic reaction, other serious injury, or death. 5. What if there is a serious problem?     An allergic reaction could occur after the vaccinated person leaves the clinic. If you see signs of a severe allergic reaction (hives, swelling of the face and throat, difficulty breathing, a fast heartbeat, dizziness, or weakness), call 9-1-1 and get the person to the nearest hospital.    For other signs that concern you, call your health care provider. Adverse reactions should be reported to the Vaccine Adverse Event Reporting System (VAERS). Your health care provider will usually file this report, or you can do it yourself. Visit the VAERS website at www.vaers. [facility].gov or call 3-309.700.2433. VAERS is only for reporting reactions, and VAERS staff members do not give medical advice. 6. The National Vaccine Injury Compensation Program    The [facility] Vaccine Injury Compensation Program (VICP) is a federal program that was created to compensate people who may have been injured by certain vaccines. Claims regarding alleged injury or death due to vaccination have a time limit for filing, which may be as short as two years. Visit the VICP website at www.[facility].gov/vaccinecompensation or call 1-940.395.3136 to learn about the program and about filing a claim. 7. How can I learn more?  Ask your health care provider.  Call your local or state health department.  Visit the website of the Food and Drug Administration (FDA) for vaccine package inserts and additional information at www.fda.gov/vaccines-blood-biologics/vaccines.  Contact the Centers for Disease Control and Prevention (CDC):  - Call 3-274.508.7368 (1-800-CDC-INFO) or  - Visit CDCs influenza website at www.cdc.gov/flu. Vaccine Information Statement   Inactivated Influenza Vaccine   8/6/2021  42  [facility] 289[facility]   Department of Health and Human Services  Centers for Disease Control and Prevention    Office Use Only      Medicare Wellness Visit, Female     The best way to live healthy is to have a lifestyle where you eat a well-balanced diet, exercise regularly, limit alcohol use, and quit all forms of tobacco/nicotine, if applicable. Regular preventive services are another way to keep healthy. Preventive services (vaccines, screening tests, monitoring & exams) can help personalize your care plan, which helps you manage your own care. Screening tests can find health problems at the earliest stages, when they are easiest to treat. [facility] follows the current, evidence-based guidelines published by the [facility] ([facility]) when recommending preventive services for our patients. Because we follow these guidelines, sometimes recommendations change over time as research supports it. (For example, mammograms used to be recommended annually. Even though Medicare will still pay for an annual mammogram, the newer guidelines recommend a mammogram every two years for women of average risk). Of course, you and your doctor may decide to screen more often for some diseases, based on your risk and your co-morbidities (chronic disease you are already diagnosed with). Preventive services for you include:  - Medicare offers their members a free annual wellness visit, which is time for you and your primary care provider to discuss and plan for your preventive service needs. Take advantage of this benefit every year!  -All adults over the age of 72 should receive the recommended pneumonia vaccines. Current USPSTF guidelines recommend a series of two vaccines for the best pneumonia protection.   -All adults should have a flu vaccine yearly and a tetanus vaccine every 10 years.   -All adults age 48 and older should receive the shingles vaccines (series of two vaccines).       -All adults age 38-68 who are overweight should have a diabetes screening test once every three years.   -All adults born between [birth year] and 1966 should be screened once for Hepatitis C.  -Other screening tests and preventive services for persons with diabetes include: an eye exam to screen for diabetic retinopathy, a kidney function test, a foot exam, and stricter control over your cholesterol.   -Cardiovascular screening for adults with routine risk involves an electrocardiogram (ECG) at intervals determined by your doctor.   -Colorectal cancer screenings should be done for adults age 54-65 with no increased risk factors for colorectal cancer. There are a number of acceptable methods of screening for this type of cancer. Each test has its own benefits and drawbacks. Discuss with your doctor what is most appropriate for you during your annual wellness visit. The different tests include: colonoscopy (considered the best screening method), a fecal occult blood test, a fecal DNA test, and sigmoidoscopy.    -A bone mass density test is recommended when a woman turns 65 to screen for osteoporosis. This test is only recommended one time, as a screening. Some providers will use this same test as a disease monitoring tool if you already have osteoporosis. -Breast cancer screenings are recommended every other year for women of normal risk, age 54-69.  -Cervical cancer screenings for women over age 72 are only recommended with certain risk factors. Here is a list of your current Health Maintenance items (your personalized list of preventive services) with a due date: There are no preventive care reminders to display for this patient.

## 2021-11-27 LAB
BACTERIA UR CULT: ABNORMAL
BACTERIA UR CULT: ABNORMAL

## 2021-11-29 RX ORDER — CEFUROXIME AXETIL 250 MG/1
250 TABLET ORAL 2 TIMES DAILY
Qty: 20 TABLET | Refills: 0 | Status: SHIPPED | OUTPATIENT
Start: 2021-11-29 | End: 2021-12-09

## 2021-11-29 NOTE — PROGRESS NOTES
Called and spoke with patient's care taker Valentino Line. Notified her per provider note. Care taker verbalized understanding and will start patient on ceftin.

## 2021-11-29 NOTE — PROGRESS NOTES
Notify pt and care taker as pt lives in a group home that she has a bladder infection based on urine culture. Start ceftin 250 mg bid x 10 days #20 and no refills. I have sent medication to her pharmacy.

## 2021-12-11 RX ORDER — POLYETHYLENE GLYCOL 3350 17 G/17G
POWDER, FOR SOLUTION ORAL
Qty: 510 G | Refills: 12 | Status: SHIPPED | OUTPATIENT
Start: 2021-12-11

## 2021-12-11 RX ORDER — ERGOCALCIFEROL 1.25 MG/1
CAPSULE ORAL
Qty: 5 CAPSULE | Refills: 12 | Status: SHIPPED | OUTPATIENT
Start: 2021-12-11

## 2021-12-27 NOTE — TELEPHONE ENCOUNTER
Requested Prescriptions     Pending Prescriptions Disp Refills    inulin (Fiber Choice, inulin,) 1.5 gram chew 60 Tablet 11     Sig: Take 1 Tablet by mouth two (2) times a day.        [address] [place] [address], [facility] [facility]  [phone]

## 2022-02-08 ENCOUNTER — OFFICE VISIT (OUTPATIENT)
Dept: NEUROLOGY | Age: 72
End: 2022-02-08

## 2022-02-08 VITALS
HEIGHT: 59 IN | OXYGEN SATURATION: 97 % | WEIGHT: 174 LBS | DIASTOLIC BLOOD PRESSURE: 72 MMHG | BODY MASS INDEX: 35.08 KG/M2 | SYSTOLIC BLOOD PRESSURE: 118 MMHG | HEART RATE: 64 BPM

## 2022-02-08 DIAGNOSIS — F03.90 DEMENTIA WITHOUT BEHAVIORAL DISTURBANCE, UNSPECIFIED DEMENTIA TYPE: Primary | ICD-10-CM

## 2022-02-08 DIAGNOSIS — E66.01 SEVERE OBESITY (BMI 35.0-39.9) WITH COMORBIDITY (HCC): ICD-10-CM

## 2022-02-08 PROCEDURE — G8417 CALC BMI ABV UP PARAM F/U: HCPCS | Performed by: PSYCHIATRY & NEUROLOGY

## 2022-02-08 PROCEDURE — 1101F PT FALLS ASSESS-DOCD LE1/YR: CPT | Performed by: PSYCHIATRY & NEUROLOGY

## 2022-02-08 PROCEDURE — G9899 SCRN MAM PERF RSLTS DOC: HCPCS | Performed by: PSYCHIATRY & NEUROLOGY

## 2022-02-08 PROCEDURE — G9717 DOC PT DX DEP/BP F/U NT REQ: HCPCS | Performed by: PSYCHIATRY & NEUROLOGY

## 2022-02-08 PROCEDURE — 1090F PRES/ABSN URINE INCON ASSESS: CPT | Performed by: PSYCHIATRY & NEUROLOGY

## 2022-02-08 PROCEDURE — 3017F COLORECTAL CA SCREEN DOC REV: CPT | Performed by: PSYCHIATRY & NEUROLOGY

## 2022-02-08 PROCEDURE — 99214 OFFICE O/P EST MOD 30 MIN: CPT | Performed by: PSYCHIATRY & NEUROLOGY

## 2022-02-08 PROCEDURE — G8427 DOCREV CUR MEDS BY ELIG CLIN: HCPCS | Performed by: PSYCHIATRY & NEUROLOGY

## 2022-02-08 PROCEDURE — G8536 NO DOC ELDER MAL SCRN: HCPCS | Performed by: PSYCHIATRY & NEUROLOGY

## 2022-02-08 NOTE — PROGRESS NOTES
Chief Complaint   Patient presents with    Follow-up     Dementia          HISTORY OF PRESENT ILLNESS  Sherron Chaudhry came back for follow-up. She came with her caregiver. No change since last seen. Memory and overall behavior has been stable. Remains independent with most basic activities of daily living. Continues to live at [facility] assisted living. She is tolerating galantamine 4 mg twice a day quite well. She does not have to cook food, handle money/finances etc.    RECAP  She has been diagnosed with neurodegenerative dementia superimposed on baseline intellectual disability. She keeps asking the same question multiple times a day and is now less oriented in general to her surroundings then she has been in the past.    She was tried on memantine but it also caused some GI issues. She was tried on Aricept in the past which caused diarrhea and was discontinued. She has also had behavioral issues in the past and has been hospitalized in the inpatient psychiatric unit. She needs assistance with most activities of daily living. She is living in a group home. She had oral dyskinesias which improved after she stopped Risperdal      Current Outpatient Medications   Medication Sig    inulin (Fiber Choice, inulin,) 1.5 gram chew Take 1 Tablet by mouth two (2) times a day.  ergocalciferol (ERGOCALCIFEROL) 1,250 mcg (50,000 unit) capsule TAKE 1 CAPSULE ONCE A WEEK ON FRIDAY.  polyethylene glycol (ClearLax) 17 gram/dose powder MIX 1 CAPFUL (17gm - UP TO LINE) IN WATER AND DRINK ONCE DAILY AS NEEDED FOR CONSTIPATION    docusate sodium (COLACE) 100 mg capsule TAKE 1 CAPSULE BY MOUTH DAILY    cyanocobalamin (VITAMIN B12) 500 mcg tablet TAKE (2) TABLETS (=1000mcg) BY MOUTH DAILY.     calcium carbonate (CALTREX) 600 mg calcium (1,500 mg) tablet TAKE 1 TABLET BY MOUTH TWICE A DAY    esomeprazole (NEXIUM) 40 mg capsule TAKE 1 CAPSULE BY MOUTH DAILY    galantamine (RAZADYNE) 4 mg tablet TAKE 1 TABLET BY MOUTH TWICE A DAY    menthol-zinc oxide (Gold Bond Medicated) 0.15-1 % powder Apply to skin twice a day as need.  nystatin (MYCOSTATIN) topical cream Apply  to affected area two (2) times a day. As needed    loratadine (CLARITIN) 10 mg tablet Take 1 Tab by mouth daily as needed for Allergies.  hydrOXYzine HCl (ATARAX) 25 mg tablet Take  by mouth three (3) times daily as needed for Itching.  QUEtiapine (SEROQUEL) 50 mg tablet Take 1 Tab by mouth three (3) times daily. Indications: BIPOLAR DISORDER IN REMISSION    FLUoxetine (PROZAC) 20 mg capsule Take 3 Caps by mouth daily. Indications: ANXIETY WITH DEPRESSION    busPIRone (BUSPAR) 10 mg tablet Take 2 Tabs by mouth three (3) times daily. Indications: Generalized Anxiety Disorder (Patient taking differently: Take 20 mg by mouth two (2) times a day. Indications: repeated episodes of anxiety)     No current facility-administered medications for this visit. PHYSICAL EXAMINATION:    Visit Vitals  /72 (BP 1 Location: Right upper arm, BP Patient Position: Sitting)   Pulse 64   Ht 4' 11\" (1.499 m)   Wt 174 lb (78.9 kg)   SpO2 97%   BMI 35.14 kg/m²       NEUROLOGICAL EXAMINATION:     Mental Status:   Alert and oriented to person, place. She does quite poorly on cognitive assessment and has difficulties with visuospatial, executive function, memory, abstraction and orientation. She could not tell me today's date, month or year. She could not remember what she ate for breakfast.  She could not tell me where she was currently. Cranial Nerves:    II, III, IV, VI:  Visual acuity grossly intact. Visual fields are normal.    Pupils are equal, round, and reactive to light and accommodation. Extra-ocular movements are full and fluid. V-XII: Hearing is grossly intact. Facial features are symmetric, with normal sensation and strength. The palate rises symmetrically and the tongue protrudes midline. Sternocleidomastoids 5/5.       Motor Examination: Normal tone, bulk, and strength. 5/5 muscle strength throughout. No cogwheel rigidity or clonus present. Sensory exam:  Normal throughout to pinprick, temperature, and vibration sense. Coordination:  Finger to nose and rapid arm movement testing was normal.   No resting or intention tremor    Gait and Station:  Steady. Normal arm swing. No Rhomberg or pronator drift. No muscle wasting or fasiculations noted. Reflexes:  DTRs 2+ throughout. Toes downgoing. LABS / IMAGING    Lab Results   Component Value Date/Time    WBC 4.6 07/07/2020 12:04 PM    HGB 12.6 07/07/2020 12:04 PM    HCT 37.7 07/07/2020 12:04 PM    PLATELET 190 (L) 44/46/7758 12:04 PM    MCV 90 07/07/2020 12:04 PM     Lab Results   Component Value Date/Time    Sodium 141 07/07/2020 12:04 PM    Potassium 4.1 07/07/2020 12:04 PM    Chloride 104 07/07/2020 12:04 PM    CO2 23 07/07/2020 12:04 PM    Anion gap 11 02/22/2018 06:01 AM    Glucose 88 07/07/2020 12:04 PM    Glucose 98 02/23/2018 06:45 AM    BUN 18 07/07/2020 12:04 PM    Creatinine 0.64 07/07/2020 12:04 PM    BUN/Creatinine ratio 28 07/07/2020 12:04 PM    GFR est  07/07/2020 12:04 PM    GFR est non-AA 91 07/07/2020 12:04 PM    Calcium 8.8 07/07/2020 12:04 PM    Bilirubin, total 0.4 07/07/2020 12:04 PM    Alk. phosphatase 94 07/07/2020 12:04 PM    Protein, total 6.4 07/07/2020 12:04 PM    Albumin 3.9 07/07/2020 12:04 PM    Globulin 4.0 02/22/2018 06:01 AM    A-G Ratio 1.6 07/07/2020 12:04 PM    ALT (SGPT) 20 07/07/2020 12:04 PM     Lab Results   Component Value Date/Time    Vitamin B12 993 (H) 11/17/2016 08:48 AM    Folate 17.7 12/04/2015 10:10 AM     Lab Results   Component Value Date/Time    TSH 0.73 02/22/2018 06:01 AM     CT Results (most recent):  Results from [place] encounter on 02/20/18    CT HEAD WO CONT    Narrative  EXAM:  CT HEAD WITHOUT CONTRAST  INDICATION: Fall. COMPARISON: 10/13/2015. CONTRAST: None.     TECHNIQUE: Unenhanced CT of the head was performed using 5 mm images. Brain and  bone windows were generated. Sagittal and coronal reformations were generated. CT dose reduction was achieved through use of a standardized protocol tailored  for this examination and automatic exposure control for dose modulation. CT dose  reduction was achieved through use of a standardized protocol tailored for this  examination and automatic exposure control for dose modulation. Adaptive  statistical iterative reconstruction (ASIR) was utilized for this examination. FINDINGS:  The ventricles and sulci are normal in size, shape and configuration and  midline. There is no significant white matter disease. There is no  intracranial hemorrhage. There is no extra-axial collection, mass, mass effect  or midline shift. The basilar cisterns are open. No acute infarct is  identified. The bone windows demonstrate no abnormalities. The visualized  portions of the paranasal sinuses and mastoid air cells are clear. Impression  IMPRESSION: Normal unenhanced CT examination of the head. ASSESSMENT    ICD-10-CM ICD-9-CM    1. Dementia without behavioral disturbance, unspecified dementia type ([facility] [address]  F03.90 294.20    2. Severe obesity (BMI 35.0-39. 9) with comorbidity ([facility] [address]  E66.01 278.01        DISCUSSION  Ms. Kyler Butcher has baseline moderate cognitive impairment  related to developmental delay. She has superimposed neurodegenerative dementia. She has remained stable over the past 2 years . She has not tolerated donepezil or memantine. She is tolerating galantamine well and will maintain current dose .     She is currently living in a group home in a supervised situation  We will continue clinical monitoring   Follow-up annually    Time 30 minutes    Raleigh Jiménez MD  Diplomate, American Board of Psychiatry & Neurology (Neurology)  Diplomate, [address] of Psychiatry & Neurology (Clinical Neurophysiology)  [facility] Board of Electrodiagnostic Medicine

## 2022-02-08 NOTE — PROGRESS NOTES
Chief Complaint   Patient presents with    Follow-up     Dementia      Visit Vitals  /72 (BP 1 Location: Right upper arm, BP Patient Position: Sitting)   Pulse 64   Ht 4' 11\" (1.499 m)   Wt 174 lb (78.9 kg)   SpO2 97%   BMI 35.14 kg/m²

## 2022-02-09 ENCOUNTER — TELEPHONE (OUTPATIENT)
Dept: INTERNAL MEDICINE CLINIC | Age: 72
End: 2022-02-09

## 2022-02-09 ENCOUNTER — OFFICE VISIT (OUTPATIENT)
Dept: INTERNAL MEDICINE CLINIC | Age: 72
End: 2022-02-09
Payer: MEDICARE

## 2022-02-09 VITALS
WEIGHT: 176.2 LBS | DIASTOLIC BLOOD PRESSURE: 75 MMHG | HEIGHT: 59 IN | BODY MASS INDEX: 35.52 KG/M2 | HEART RATE: 63 BPM | RESPIRATION RATE: 18 BRPM | OXYGEN SATURATION: 97 % | TEMPERATURE: 97.8 F | SYSTOLIC BLOOD PRESSURE: 107 MMHG

## 2022-02-09 DIAGNOSIS — N89.8 VAGINAL DISCHARGE: Primary | ICD-10-CM

## 2022-02-09 DIAGNOSIS — B37.2 CANDIDAL INTERTRIGO: ICD-10-CM

## 2022-02-09 DIAGNOSIS — R82.90 ABNORMAL URINE ODOR: ICD-10-CM

## 2022-02-09 DIAGNOSIS — R30.0 DYSURIA: ICD-10-CM

## 2022-02-09 LAB
BILIRUB UR QL STRIP: NEGATIVE
GLUCOSE UR-MCNC: NEGATIVE MG/DL
KETONES P FAST UR STRIP-MCNC: NEGATIVE MG/DL
PH UR STRIP: 6 [PH] (ref 4.6–8)
PROT UR QL STRIP: NEGATIVE
SP GR UR STRIP: 1.03 (ref 1–1.03)
UA UROBILINOGEN AMB POC: NORMAL (ref 0.2–1)
URINALYSIS CLARITY POC: CLEAR
URINALYSIS COLOR POC: YELLOW
URINE BLOOD POC: NEGATIVE
URINE LEUKOCYTES POC: NORMAL
URINE NITRITES POC: NEGATIVE

## 2022-02-09 PROCEDURE — 1101F PT FALLS ASSESS-DOCD LE1/YR: CPT | Performed by: NURSE PRACTITIONER

## 2022-02-09 PROCEDURE — G9899 SCRN MAM PERF RSLTS DOC: HCPCS | Performed by: NURSE PRACTITIONER

## 2022-02-09 PROCEDURE — 81003 URINALYSIS AUTO W/O SCOPE: CPT | Performed by: NURSE PRACTITIONER

## 2022-02-09 PROCEDURE — 99214 OFFICE O/P EST MOD 30 MIN: CPT | Performed by: NURSE PRACTITIONER

## 2022-02-09 PROCEDURE — G8427 DOCREV CUR MEDS BY ELIG CLIN: HCPCS | Performed by: NURSE PRACTITIONER

## 2022-02-09 PROCEDURE — G8417 CALC BMI ABV UP PARAM F/U: HCPCS | Performed by: NURSE PRACTITIONER

## 2022-02-09 PROCEDURE — G9717 DOC PT DX DEP/BP F/U NT REQ: HCPCS | Performed by: NURSE PRACTITIONER

## 2022-02-09 PROCEDURE — 1090F PRES/ABSN URINE INCON ASSESS: CPT | Performed by: NURSE PRACTITIONER

## 2022-02-09 PROCEDURE — G0463 HOSPITAL OUTPT CLINIC VISIT: HCPCS | Performed by: NURSE PRACTITIONER

## 2022-02-09 PROCEDURE — 3017F COLORECTAL CA SCREEN DOC REV: CPT | Performed by: NURSE PRACTITIONER

## 2022-02-09 PROCEDURE — G8536 NO DOC ELDER MAL SCRN: HCPCS | Performed by: NURSE PRACTITIONER

## 2022-02-09 RX ORDER — NYSTATIN 100000 [USP'U]/G
POWDER TOPICAL 4 TIMES DAILY
Qty: 60 G | Refills: 1 | Status: SHIPPED | OUTPATIENT
Start: 2022-02-09 | End: 2022-03-01

## 2022-02-09 RX ORDER — KETOCONAZOLE 20 MG/G
CREAM TOPICAL DAILY
Qty: 15 G | Refills: 0 | Status: SHIPPED | OUTPATIENT
Start: 2022-02-09 | End: 2022-03-01

## 2022-02-09 NOTE — PROGRESS NOTES
HISTORY OF PRESENT ILLNESS  Kenyatta Chaves is a 66 y.o. female. Patient, accompanied by her daughter, reports pain with urination over the past week. She also notes odor to urine and white vaginal discharge. Patient is poor historian due to dementia. Patient also has recurrent candida infection under both breasts and between buttocks and groin. They are using nystatin and gold bond powder with improvement. Visit Vitals  /75 (BP 1 Location: Right arm, BP Patient Position: Sitting, BP Cuff Size: Large adult)   Pulse 63   Temp 97.8 °F (36.6 °C) (Temporal)   Resp 18   Ht 4' 11\" (1.499 m)   Wt 176 lb 3.2 oz (79.9 kg)   SpO2 97%   BMI 35.59 kg/m²       HPI    Review of Systems   Genitourinary: Positive for dysuria. Vaginal discharge   Skin: Positive for rash. Physical Exam  Exam conducted with a chaperone present. Constitutional:       Appearance: Normal appearance. Genitourinary:     Exam position: Lithotomy position. Comments: Performed nuswab without speculum exam; able to see minimal white discharge, stool present around rectum  Skin:     Comments: Skin under both breasts with patches of mild erythema and flaky border, no cracking of skin noted; similar rash in between buttocks   Neurological:      Mental Status: She is alert. Mental status is at baseline. Psychiatric:         Mood and Affect: Mood normal.         Behavior: Behavior is cooperative. Cognition and Memory: Cognition is impaired. ASSESSMENT and PLAN    ICD-10-CM ICD-9-CM    1. Vaginal discharge  N89.8 623.5 NUSWAB VAGINITIS PLUS      NUSWAB VAGINITIS PLUS   2. Dysuria  R30.0 788.1 AMB POC URINALYSIS DIP STICK AUTO W/O MICRO      CULTURE, URINE      CULTURE, URINE   3. Candidal intertrigo  B37.2 112.3 ketoconazole (NIZORAL) 2 % topical cream      nystatin (MYCOSTATIN) powder   4.  Abnormal urine odor  R82.90 791.9      Orders Placed This Encounter    CULTURE, URINE    NUSWAB VAGINITIS PLUS    AMB POC URINALYSIS DIP STICK AUTO W/O MICRO    ketoconazole (NIZORAL) 2 % topical cream    nystatin (MYCOSTATIN) powder   nuswab and urine culture sent  Keep skin folds as dry as possible  Cloth underwear and bras when needed, otherwise leave open to air  Dry thoroughly after shower or urination  May alternate ketoconazole cream and nystatin powder  Follow-up if no improvement

## 2022-02-11 LAB
BACTERIA SPEC CULT: NORMAL
CC UR VC: NORMAL
SERVICE CMNT-IMP: NORMAL

## 2022-02-12 LAB
A VAGINAE DNA VAG QL NAA+PROBE: NORMAL SCORE
BVAB2 DNA VAG QL NAA+PROBE: NORMAL SCORE
C ALBICANS DNA VAG QL NAA+PROBE: NEGATIVE
C GLABRATA DNA VAG QL NAA+PROBE: NEGATIVE
C TRACH RRNA SPEC QL NAA+PROBE: NEGATIVE
MEGA1 DNA VAG QL NAA+PROBE: NORMAL SCORE
N GONORRHOEA RRNA SPEC QL NAA+PROBE: NEGATIVE
SPECIMEN SOURCE: NORMAL
T VAGINALIS RRNA SPEC QL NAA+PROBE: NEGATIVE

## 2022-02-28 DIAGNOSIS — B37.2 CANDIDAL INTERTRIGO: ICD-10-CM

## 2022-03-01 RX ORDER — NYSTATIN 100000 [USP'U]/G
POWDER TOPICAL
Qty: 60 G | Refills: 11 | Status: SHIPPED | OUTPATIENT
Start: 2022-03-01

## 2022-03-01 RX ORDER — KETOCONAZOLE 20 MG/G
CREAM TOPICAL
Qty: 15 G | Refills: 11 | Status: SHIPPED | OUTPATIENT
Start: 2022-03-01

## 2022-03-11 DIAGNOSIS — E78.00 HYPERCHOLESTEREMIA: Primary | ICD-10-CM

## 2022-03-18 PROBLEM — M17.0 BILATERAL PRIMARY OSTEOARTHRITIS OF KNEE: Status: ACTIVE | Noted: 2017-12-20

## 2022-03-19 PROBLEM — F03.918: Status: ACTIVE | Noted: 2018-02-24

## 2022-03-19 PROBLEM — B37.2 YEAST INFECTION OF THE SKIN: Status: ACTIVE | Noted: 2021-03-17

## 2022-04-01 ENCOUNTER — TELEPHONE (OUTPATIENT)
Dept: INTERNAL MEDICINE CLINIC | Age: 72
End: 2022-04-01

## 2022-04-01 NOTE — TELEPHONE ENCOUNTER
----- Message from Angy Alvarenga NP sent at 2/11/2022 10:15 AM EST -----  Please notify urine culture was negative.  No UTI.  ----- Message -----  From: Rupal Aguilar LPN  Sent: 9/1/6468  11:29 AM EST  To: Angy Alvarenga NP

## 2022-04-01 NOTE — TELEPHONE ENCOUNTER
Patient results given to caregiver, Aurora.  Verbalized understanding and no further questions were asked

## 2022-04-05 DIAGNOSIS — B37.2 YEAST INFECTION OF THE SKIN: Primary | ICD-10-CM

## 2022-04-06 RX ORDER — NYSTATIN 100000 U/G
CREAM TOPICAL
Qty: 30 G | Refills: 2 | Status: SHIPPED | OUTPATIENT
Start: 2022-04-06 | End: 2022-06-07 | Stop reason: SDUPTHER

## 2022-04-11 DIAGNOSIS — F03.90 DEMENTIA WITHOUT BEHAVIORAL DISTURBANCE, UNSPECIFIED DEMENTIA TYPE: ICD-10-CM

## 2022-04-11 DIAGNOSIS — R62.50 DEVELOPMENTAL DELAY: ICD-10-CM

## 2022-04-11 RX ORDER — GALANTAMINE 4 MG/1
TABLET, FILM COATED ORAL
Qty: 60 TABLET | Refills: 12 | Status: SHIPPED | OUTPATIENT
Start: 2022-04-11 | End: 2022-08-23

## 2022-04-12 RX ORDER — ESOMEPRAZOLE MAGNESIUM 40 MG/1
CAPSULE, DELAYED RELEASE ORAL
Qty: 30 CAPSULE | Refills: 12 | Status: SHIPPED | OUTPATIENT
Start: 2022-04-12 | End: 2022-08-23

## 2022-04-12 RX ORDER — CALCIUM CARBONATE 600 MG
TABLET ORAL
Qty: 60 TABLET | Refills: 12 | Status: SHIPPED | OUTPATIENT
Start: 2022-04-12 | End: 2022-08-23

## 2022-06-01 NOTE — PROGRESS NOTES
Problem: Depressed Mood (Adult/Pediatric)  Goal: *STG: Participates in treatment plan  Outcome: Progressing Towards Goal  Out on unit social w peers and staff. Mood and affect smiling and bright. Pleasant and cooperative, demonstrates ability to follow staff direction and unit routine. Demonstrates appropriate behaviors while interacting with peers and staff. Compliant with her care. Denies anxiety or sadness. When discussing admission pt able to state she got angry and was \"bad\" at her group home. Daily goal is to shower and talk with her family. Staff focus is on coordinating her discharge and follow up care. Problem: Falls - Risk of  Goal: *Absence of Falls  Document Eli Fall Risk and appropriate interventions in the flowsheet. Outcome: Progressing Towards Goal  Fall Risk Interventions:       Mentation Interventions: More frequent rounding    Medication Interventions: Teach patient to arise slowly    Elimination Interventions: Bed/chair exit alarm, Patient to call for help with toileting needs    History of Falls Interventions: Room close to nurse's station, Door open when patient unattended, Bed/chair exit alarm    0834: steady gait, good safety awareness and compliant with wearing slipper socks and use of bed alarm. 0900: released from her hearing. Pt signed voluntarily the day of admission and it is on paper chart.     4249: called group home to speak with manager. Staff states she is not in to call back at 1000.  Will update tx team n/a

## 2022-06-07 ENCOUNTER — OFFICE VISIT (OUTPATIENT)
Dept: INTERNAL MEDICINE CLINIC | Age: 72
End: 2022-06-07
Payer: MEDICARE

## 2022-06-07 VITALS
HEART RATE: 67 BPM | SYSTOLIC BLOOD PRESSURE: 110 MMHG | TEMPERATURE: 97.8 F | OXYGEN SATURATION: 97 % | DIASTOLIC BLOOD PRESSURE: 73 MMHG | WEIGHT: 177.6 LBS | BODY MASS INDEX: 35.8 KG/M2 | HEIGHT: 59 IN | RESPIRATION RATE: 18 BRPM

## 2022-06-07 DIAGNOSIS — F03.918: ICD-10-CM

## 2022-06-07 DIAGNOSIS — F33.42 RECURRENT MAJOR DEPRESSIVE DISORDER, IN FULL REMISSION (HCC): ICD-10-CM

## 2022-06-07 DIAGNOSIS — B37.2 YEAST INFECTION OF THE SKIN: Primary | ICD-10-CM

## 2022-06-07 DIAGNOSIS — K21.9 GASTROESOPHAGEAL REFLUX DISEASE WITHOUT ESOPHAGITIS: ICD-10-CM

## 2022-06-07 PROCEDURE — G9899 SCRN MAM PERF RSLTS DOC: HCPCS | Performed by: INTERNAL MEDICINE

## 2022-06-07 PROCEDURE — 1090F PRES/ABSN URINE INCON ASSESS: CPT | Performed by: INTERNAL MEDICINE

## 2022-06-07 PROCEDURE — G8536 NO DOC ELDER MAL SCRN: HCPCS | Performed by: INTERNAL MEDICINE

## 2022-06-07 PROCEDURE — 99214 OFFICE O/P EST MOD 30 MIN: CPT | Performed by: INTERNAL MEDICINE

## 2022-06-07 PROCEDURE — G9717 DOC PT DX DEP/BP F/U NT REQ: HCPCS | Performed by: INTERNAL MEDICINE

## 2022-06-07 PROCEDURE — 3017F COLORECTAL CA SCREEN DOC REV: CPT | Performed by: INTERNAL MEDICINE

## 2022-06-07 PROCEDURE — 1101F PT FALLS ASSESS-DOCD LE1/YR: CPT | Performed by: INTERNAL MEDICINE

## 2022-06-07 PROCEDURE — G8417 CALC BMI ABV UP PARAM F/U: HCPCS | Performed by: INTERNAL MEDICINE

## 2022-06-07 PROCEDURE — G0463 HOSPITAL OUTPT CLINIC VISIT: HCPCS | Performed by: INTERNAL MEDICINE

## 2022-06-07 PROCEDURE — G8427 DOCREV CUR MEDS BY ELIG CLIN: HCPCS | Performed by: INTERNAL MEDICINE

## 2022-06-07 RX ORDER — FLUOXETINE HYDROCHLORIDE 40 MG/1
CAPSULE ORAL
COMMUNITY
Start: 2022-06-01

## 2022-06-07 RX ORDER — NYSTATIN 100000 U/G
CREAM TOPICAL 2 TIMES DAILY
Qty: 30 G | Refills: 5 | Status: SHIPPED | OUTPATIENT
Start: 2022-06-07

## 2022-06-07 RX ORDER — QUETIAPINE FUMARATE 100 MG/1
TABLET, FILM COATED ORAL
COMMUNITY
Start: 2022-06-01

## 2022-06-07 RX ORDER — BUSPIRONE HYDROCHLORIDE 30 MG/1
TABLET ORAL
COMMUNITY
Start: 2022-06-01 | End: 2022-06-07 | Stop reason: DRUGHIGH

## 2022-06-07 NOTE — PROGRESS NOTES
Janae Chaney is a 66 y.o. female who was seen today for an acute visit. Assessment & Plan:   1. Yeast infection of the skin  Assessment & Plan:  Add nystatin cream bid as needed for rash. May also use nystatin powder prn  2. Gastroesophageal reflux disease without esophagitis  Assessment & Plan:   Well controlled, continue current medication. 3. Recurrent major depressive disorder, in full remission ([facility] [address]  Assessment & Plan:   monitored by specialist. No acute findings meriting change in the plan  4. 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 No acute findings meriting change in the plan    follow up with 6 months [facility]      Subjective:   Janae Chaney was seen for Yeast Infection (under bilateral breast.  She is accompanied by caretaker. She has had increasing rash and itching under her breast over the past several days. She does have nystatin powder that they have used but did not feel area was improving. She is also due for follow-up of her GERD and has been tolerating medication well. Caretaker reports no increased depression symptoms or worsening of her memory. She continues to follow-up with her psychiatrist and neurologist for these problems. No change in her medications. Review of Systems   Respiratory: Negative for shortness of breath. Cardiovascular: Negative for chest pain and leg swelling. Gastrointestinal: Negative for abdominal pain. Psychiatric/Behavioral: Negative for depression.    - per HPI    Physical Exam  Vitals and nursing note reviewed. Constitutional:       General: She is not in acute distress. Appearance: She is well-developed. HENT:      Head: Normocephalic and atraumatic. Cardiovascular:      Rate and Rhythm: Normal rate and regular rhythm. Pulmonary:      Effort: Pulmonary effort is normal.      Breath sounds: Normal breath sounds.    Abdominal:      General: Bowel sounds are normal.      Palpations: Abdomen is soft. Tenderness: There is no abdominal tenderness. Musculoskeletal:      Right lower leg: No edema. Left lower leg: No edema. Skin:     Findings: Rash (erythema under bilateral breast) present. Psychiatric:         Mood and Affect: Mood normal.       Visit Vitals  /73 (BP 1 Location: Right arm, BP Patient Position: Sitting, BP Cuff Size: Large adult)   Pulse 67   Temp 97.8 °F (36.6 °C) (Temporal)   Resp 18   Ht 4' 11\" (1.499 m)   Wt 177 lb 9.6 oz (80.6 kg)   SpO2 97%   BMI 35.87 kg/m²        Aspects of this note may have been generated using voice recognition software. Despite editing, there may be some syntax errors   I have discussed the diagnosis with the patient and the intended plan as seen in the above orders. The patient has received an after-visit summary and questions were answered concerning future plans. I have discussed any recommended medication side effects and warnings with the patient as well.   She has expressed understanding of the diagnosis and plan    Indiana Nova MD

## 2022-08-01 ENCOUNTER — TELEPHONE (OUTPATIENT)
Dept: INTERNAL MEDICINE CLINIC | Age: 72
End: 2022-08-01

## 2022-08-01 NOTE — TELEPHONE ENCOUNTER
Reason for call:     Philippe Mccallum from [employer] called regarding patients powders and creams for her skin condition.     Is this a new problem: yes     Date of last appointment:  6/7/2022     Can we respond via [employer]: no    Best call back number:     Vishrobinson [facility] - [phone]

## 2022-08-02 NOTE — TELEPHONE ENCOUNTER
Spoke with Hoang Pineda with [employer]. She reports patient experiencing splits in skin, causing bleeding under stomach area and between gluteus. Using nystatin powder daily but nystatin cream in applied prn. Ketoconazole topical used cream daily. Patient showers and assistance is given to dry body areas. Patient goes to day support, due to hot weather can cause sweating.

## 2022-08-10 ENCOUNTER — TELEPHONE (OUTPATIENT)
Dept: INTERNAL MEDICINE CLINIC | Age: 72
End: 2022-08-10

## 2022-08-11 NOTE — TELEPHONE ENCOUNTER
Tried to reach [facility] @ [phone], voicemail picks up and mailbox full. Unable to leave message several times when calling. [address], patient discharged from system 06/16/22. Patient using [employer] [address] in [place], called and given number to [facility] [phone]. Spoke with Avani Szymanski, indicated bed bug outbreak at home, given [employer] cell #, tried calling disconnected.

## 2022-08-19 NOTE — TELEPHONE ENCOUNTER
Tried calling patient home/cell #, mailbox full. [address] and informed contact # for [employer] disconnected.

## 2022-08-19 NOTE — TELEPHONE ENCOUNTER
Requested Prescriptions     Pending Prescriptions Disp Refills    menthol-zinc oxide (Gold Bond Medicated) 0.15-1 % powder 283 g 2     Sig: Apply to skin twice a day as need.      [address] [place] [address]  [phone]

## 2022-08-23 RX ORDER — CALCIUM CARBONATE 600 MG
TABLET ORAL
Qty: 60 TABLET | Refills: 0 | Status: SHIPPED | OUTPATIENT
Start: 2022-08-23 | End: 2022-09-21

## 2022-08-23 RX ORDER — ESOMEPRAZOLE MAGNESIUM 40 MG/1
CAPSULE, DELAYED RELEASE ORAL
Qty: 30 CAPSULE | Refills: 0 | Status: SHIPPED | OUTPATIENT
Start: 2022-08-23 | End: 2022-09-21

## 2022-08-23 NOTE — TELEPHONE ENCOUNTER
Reason for call:  hernan pinon with [facility] is returning call for Layne Xavier       Is this a new problem: yes     Date of last appointment:  6/7/2022     Can we respond via [employer]: no    Best call back number: [phone]

## 2022-08-24 ENCOUNTER — TELEPHONE (OUTPATIENT)
Dept: INTERNAL MEDICINE CLINIC | Age: 72
End: 2022-08-24

## 2022-08-30 RX ORDER — MENTHOL 0 G/G
POWDER TOPICAL
Qty: 283 G | Refills: 0 | Status: SHIPPED | OUTPATIENT
Start: 2022-08-30 | End: 2022-08-31 | Stop reason: SDUPTHER

## 2022-08-31 RX ORDER — MENTHOL 0 G/G
POWDER TOPICAL
Qty: 283 G | Refills: 0 | Status: SHIPPED | OUTPATIENT
Start: 2022-08-31

## 2022-09-14 RX ORDER — CYANOCOBALAMIN (VITAMIN B-12) 500 MCG
TABLET ORAL
Qty: 60 TABLET | Refills: 0 | Status: SHIPPED | OUTPATIENT
Start: 2022-09-14 | End: 2022-10-25

## 2022-09-21 RX ORDER — ESOMEPRAZOLE MAGNESIUM 40 MG/1
CAPSULE, DELAYED RELEASE ORAL
Qty: 30 CAPSULE | Refills: 0 | Status: SHIPPED | OUTPATIENT
Start: 2022-09-21 | End: 2022-10-25

## 2022-09-21 RX ORDER — CALCIUM CARBONATE 600 MG
TABLET ORAL
Qty: 60 TABLET | Refills: 0 | Status: SHIPPED | OUTPATIENT
Start: 2022-09-21 | End: 2022-10-25

## 2022-10-18 ENCOUNTER — TRANSCRIBE ORDER (OUTPATIENT)
Dept: SCHEDULING | Age: 72
End: 2022-10-18

## 2022-10-18 DIAGNOSIS — Z12.31 ENCOUNTER FOR SCREENING MAMMOGRAM FOR MALIGNANT NEOPLASM OF BREAST: Primary | ICD-10-CM

## 2022-10-25 RX ORDER — ESOMEPRAZOLE MAGNESIUM 40 MG/1
CAPSULE, DELAYED RELEASE ORAL
Qty: 30 CAPSULE | Refills: 0 | Status: SHIPPED | OUTPATIENT
Start: 2022-10-25 | End: 2022-11-25

## 2022-10-25 RX ORDER — CALCIUM CARBONATE 600 MG
TABLET ORAL
Qty: 60 TABLET | Refills: 0 | Status: SHIPPED | OUTPATIENT
Start: 2022-10-25 | End: 2022-11-25

## 2022-10-25 RX ORDER — CYANOCOBALAMIN (VITAMIN B-12) 500 MCG
TABLET ORAL
Qty: 60 TABLET | Refills: 0 | Status: SHIPPED | OUTPATIENT
Start: 2022-10-25 | End: 2022-11-25

## 2022-10-25 RX ORDER — DOCUSATE SODIUM 100 MG
CAPSULE ORAL
Qty: 30 CAPSULE | Refills: 0 | Status: SHIPPED | OUTPATIENT
Start: 2022-10-25

## 2022-11-08 ENCOUNTER — OFFICE VISIT (OUTPATIENT)
Dept: INTERNAL MEDICINE CLINIC | Age: 72
End: 2022-11-08
Payer: MEDICARE

## 2022-11-08 VITALS
HEIGHT: 59 IN | DIASTOLIC BLOOD PRESSURE: 68 MMHG | TEMPERATURE: 97.9 F | BODY MASS INDEX: 34.43 KG/M2 | WEIGHT: 170.8 LBS | RESPIRATION RATE: 18 BRPM | SYSTOLIC BLOOD PRESSURE: 103 MMHG | OXYGEN SATURATION: 95 % | HEART RATE: 67 BPM

## 2022-11-08 DIAGNOSIS — M81.0 AGE-RELATED OSTEOPOROSIS WITHOUT CURRENT PATHOLOGICAL FRACTURE: ICD-10-CM

## 2022-11-08 DIAGNOSIS — F33.42 RECURRENT MAJOR DEPRESSIVE DISORDER, IN FULL REMISSION (HCC): ICD-10-CM

## 2022-11-08 DIAGNOSIS — E78.00 HYPERCHOLESTEROLEMIA: ICD-10-CM

## 2022-11-08 DIAGNOSIS — Z00.00 MEDICARE ANNUAL WELLNESS VISIT, SUBSEQUENT: Primary | ICD-10-CM

## 2022-11-08 DIAGNOSIS — B37.2 YEAST INFECTION OF THE SKIN: ICD-10-CM

## 2022-11-08 DIAGNOSIS — E55.9 VITAMIN D DEFICIENCY: ICD-10-CM

## 2022-11-08 DIAGNOSIS — K21.9 GASTROESOPHAGEAL REFLUX DISEASE WITHOUT ESOPHAGITIS: ICD-10-CM

## 2022-11-08 DIAGNOSIS — F03.918: ICD-10-CM

## 2022-11-08 PROCEDURE — 3017F COLORECTAL CA SCREEN DOC REV: CPT | Performed by: INTERNAL MEDICINE

## 2022-11-08 PROCEDURE — G0463 HOSPITAL OUTPT CLINIC VISIT: HCPCS | Performed by: INTERNAL MEDICINE

## 2022-11-08 PROCEDURE — 99214 OFFICE O/P EST MOD 30 MIN: CPT | Performed by: INTERNAL MEDICINE

## 2022-11-08 PROCEDURE — G9899 SCRN MAM PERF RSLTS DOC: HCPCS | Performed by: INTERNAL MEDICINE

## 2022-11-08 PROCEDURE — G9717 DOC PT DX DEP/BP F/U NT REQ: HCPCS | Performed by: INTERNAL MEDICINE

## 2022-11-08 PROCEDURE — 1090F PRES/ABSN URINE INCON ASSESS: CPT | Performed by: INTERNAL MEDICINE

## 2022-11-08 PROCEDURE — G0439 PPPS, SUBSEQ VISIT: HCPCS | Performed by: INTERNAL MEDICINE

## 2022-11-08 PROCEDURE — G8427 DOCREV CUR MEDS BY ELIG CLIN: HCPCS | Performed by: INTERNAL MEDICINE

## 2022-11-08 PROCEDURE — 1101F PT FALLS ASSESS-DOCD LE1/YR: CPT | Performed by: INTERNAL MEDICINE

## 2022-11-08 PROCEDURE — G8417 CALC BMI ABV UP PARAM F/U: HCPCS | Performed by: INTERNAL MEDICINE

## 2022-11-08 PROCEDURE — G8536 NO DOC ELDER MAL SCRN: HCPCS | Performed by: INTERNAL MEDICINE

## 2022-11-08 RX ORDER — FLUOXETINE HYDROCHLORIDE 20 MG/1
20 CAPSULE ORAL DAILY
COMMUNITY
Start: 2022-10-25

## 2022-11-08 RX ORDER — BUSPIRONE HYDROCHLORIDE 30 MG/1
30 TABLET ORAL 2 TIMES DAILY
COMMUNITY
Start: 2022-10-25

## 2022-11-08 RX ORDER — QUETIAPINE FUMARATE 50 MG/1
50 TABLET, FILM COATED ORAL
COMMUNITY
Start: 2022-10-25

## 2022-11-08 NOTE — PATIENT INSTRUCTIONS

## 2022-11-08 NOTE — PROGRESS NOTES
This is the Subsequent Medicare Annual Wellness Exam, performed 12 months or more after the Initial AWV or the last Subsequent AWV    I have reviewed the patient's medical history in detail and updated the computerized patient record. Assessment/Plan   Education and counseling provided:  Are appropriate based on today's review and evaluation    1. Medicare annual wellness visit, subsequent     Depression Risk Factor Screening     3 most recent PHQ Screens 11/8/2022   Little interest or pleasure in doing things Not at all   Feeling down, depressed, irritable, or hopeless Not at all   Total Score PHQ 2 0       Alcohol & Drug Abuse Risk Screen    Do you average more than 1 drink per night or more than 7 drinks a week:  No    On any one occasion in the past three months have you have had more than 3 drinks containing alcohol:  No          Functional Ability and Level of Safety    Hearing: Hearing is good. Activities of Daily Living: The home contains: no safety equipment. Patient needs help with:  transportation, managing medications, and managing money      Ambulation: with no difficulty     Fall Risk:  Fall Risk Assessment, last 12 mths 11/8/2022   Able to walk? Yes   Fall in past 12 months? 0   Do you feel unsteady? 0   Are you worried about falling 0   Number of falls in past 12 months -      Abuse Screen:  Patient is not abused       Cognitive Screening    Has your family/caregiver stated any concerns about your memory: no     Cognitive Screening: Patient has baseline intellectual disability and neuro degenerative dementia and MMSE not performed. Cognition at baseline.     Health Maintenance Due     Health Maintenance Due   Topic Date Due    COVID-19 Vaccine (4 - Booster for [employer] series) 12/25/2021       Patient Care Team   Patient Care Team:  Bhanu Perez MD as PCP - General  [facility] Lazaro Vaughan MD as PCP - [address] [place] [address]  [facility] Provider  Dinorah Hernández MD as Physician (Ophthalmology)  Nisha Fink Benito Barlow DPM as Physician ([facility])  Mann Francis MD (Neurology)  Xin Al MD (Psychiatry)    History     Patient Active Problem List   Diagnosis Code    Allergic rhinitis J30.9    Hypercholesteremia E78.00    Depression F32. 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 [address] F33.42     Past Medical History:   Diagnosis Date    Allergic rhinitis, cause unspecified 7/15/2010    Anxiety     Detached retina     Diverticulosis     Fracture     left wrist fx & surgery; pt fell     GERD (gastroesophageal reflux disease)     HTN (hypertension)     Memory disorder     Mental retardation     Osteoporosis 8/5/2016    Other and unspecified hyperlipidemia     S/P colonoscopy 03/01/08    Snoring     Thrombocytopenia (HCC)       Past Surgical History:   Procedure Laterality Date    COLONOSCOPY  3-5-2008    HX CATARACT REMOVAL      HX OVARIAN CYST REMOVAL      HX POLYPECTOMY      ablation     Current Outpatient Medications   Medication Sig Dispense Refill    busPIRone (BUSPAR) 30 mg tablet Take 30 mg by mouth two (2) times a day. FLUoxetine (PROzac) 20 mg capsule Take 20 mg by mouth daily. QUEtiapine (SEROquel) 50 mg tablet Take 50 mg by mouth daily (with lunch).       Vitamin B-12 500 mcg tablet TAKE 2 TABLETS (1,000 MCG) BY MOUTH DAILY FOR SUPPLEMENT 60 Tablet 0    calcium carbonate (CALTREX) 600 mg calcium (1,500 mg) tablet TAKE 1 TABLET BY MOUTH TWICE DAILY FOR SUPPLEMENT 60 Tablet 0    esomeprazole (NEXIUM) 40 mg capsule TAKE 1 CAPSULE BY MOUTH ONCE DAILY FOR GERD 30 Capsule 0    Stool Softener 100 mg capsule TAKE 1 CAPSULE BY MOUTH ONCE A DAY FOR CONSTIPATION 30 Capsule 0    menthol-zinc oxide (Gold Bond Medicated) 0.15-1 % powder Apply to skin twice a day as need. 283 g 0    galantamine (RAZADYNE) 4 mg tablet TAKE 1 TABLET BY MOUTH TWICE DAILY FOR DEMENTIA 180 Tablet 1    FLUoxetine (PROzac) 40 mg capsule Take 40 mg by mouth daily. QUEtiapine (SEROquel) 100 mg tablet Take 100 mg by mouth two (2) times a day. nystatin (MYCOSTATIN) topical cream Apply  to affected area two (2) times a day. As needed for rash 30 g 5    ketoconazole (NIZORAL) 2 % topical cream APPLY TOPICALLY TO AFFECTED AREA ONCE DAILY 15 g 11    nystatin (MYCOSTATIN) powder APPLY POWDER TO AFFECTED AREA 4 TIMES A DAY 60 g 11    inulin (Fiber Choice, inulin,) 1.5 gram chew Take 1 Tablet by mouth two (2) times a day. 60 Tablet 11    ergocalciferol (ERGOCALCIFEROL) 1,250 mcg (50,000 unit) capsule TAKE 1 CAPSULE ONCE A WEEK ON FRIDAY. 5 Capsule 12    polyethylene glycol (ClearLax) 17 gram/dose powder MIX 1 CAPFUL (17gm - UP TO LINE) IN WATER AND DRINK ONCE DAILY AS NEEDED FOR CONSTIPATION 510 g 12    loratadine (CLARITIN) 10 mg tablet Take 1 Tab by mouth daily as needed for Allergies. 30 Tab PRN    hydrOXYzine HCl (ATARAX) 25 mg tablet Take  by mouth three (3) times daily as needed for Itching. No Known Allergies    Family History   Problem Relation Age of Onset    Diabetes Father     Heart Disease Father      Social History     Tobacco Use    Smoking status: Never    Smokeless tobacco: Never   Substance Use Topics    Alcohol use: No         Mayra Gonsalez MD   Collin Weir is a 67 y.o. female who was seen today for a follow-up visit. Assessment & Plan:   1. Medicare annual wellness visit, subsequent  Health maintenance reviewed and updated with patient today at visit. Reviewed vaccine recommendations. 2. Age-related osteoporosis without current pathological fracture  Assessment & Plan:   Reviewed last bone density now measuring osteopenia. Fosamax stopped in 2021.   Continue with calcium, vitamin D and exercise for bone health repeat bone density in 1 year. 3. Hypercholesterolemia  Assessment & Plan:  Continue lifestyle management. Orders:  -     CBC WITH AUTOMATED DIFF; Future  -     LIPID PANEL; Future  -     METABOLIC PANEL, COMPREHENSIVE; Future  4. Recurrent major depressive disorder, in full remission ([facility] [address]  Assessment & Plan:   monitored by specialist. No acute findings meriting change in the plan  5. Vitamin D deficiency  -     VITAMIN D, 25 HYDROXY; Future  6. Yeast infection of the skin  Assessment & Plan:   Stable continue with nystatin powder and cream.  7. Gastroesophageal reflux disease without esophagitis  Assessment & Plan:   well controlled, continue current medications  8. 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 No acute findings meriting change in the plan  Follow-up 6 months. Group home form completed and scanned to chart. Subjective:   Natasha Verde is accompanied by her group home caretaker and was also seen for follow-up hypercholesterolemia and history of osteoporosis managed with Fosamax x4 years and Fosamax stopped in 2021 due to significant improvement in bone density now at osteopenic levels. She is getting calcium in diet and vitamin D. Her skin yeast infections are stable with alternating as needed treatment with nystatin powder and cream.   She is followed for depression and anxiety by psychiatry and has done well with medication therapy no complaints today. Denies depression. She lives in group home in a supervised situation. She also sees neurology Dr. Mejia Shankar for history of neurodegenerative dementia superimposed on baseline intellectual disability. She did not tolerate denies donepezil or memantine. She is tolerating galantamine      Review of Systems   Constitutional:  Negative for fever and malaise/fatigue. HENT:  Negative for congestion and sore throat. Respiratory:  Negative for cough and shortness of breath. Cardiovascular:  Negative for chest pain and leg swelling. Gastrointestinal:  Negative for abdominal pain, constipation, diarrhea and heartburn. Genitourinary:  Negative for dysuria and urgency. Musculoskeletal:  Negative for back pain. Skin:  Positive for rash (Has on and off rash under her breast which is felt to be yeast). Neurological:  Negative for dizziness, sensory change, focal weakness and headaches. Psychiatric/Behavioral:  Negative for depression and suicidal ideas. - per HPI    Physical Exam  Vitals and nursing note reviewed. Constitutional:       General: She is not in acute distress. Appearance: She is well-developed. HENT:      Head: Normocephalic and atraumatic. Right Ear: Tympanic membrane and ear canal normal.      Left Ear: Tympanic membrane and ear canal normal.      Nose: Nose normal.      Mouth/Throat:      Mouth: Mucous membranes are moist.      Pharynx: No posterior oropharyngeal erythema. Eyes:      Extraocular Movements: Extraocular movements intact. Conjunctiva/sclera: Conjunctivae normal.      Pupils: Pupils are equal, round, and reactive to light. Neck:      Thyroid: No thyromegaly. Cardiovascular:      Rate and Rhythm: Normal rate and regular rhythm. Heart sounds: Normal heart sounds. No murmur heard. Pulmonary:      Effort: Pulmonary effort is normal.      Breath sounds: Normal breath sounds. Chest:      Comments: Breast exam: breasts appear normal, no suspicious masses, no nipple changes or axillary nodes. Few erythematous papules bilaterally under breasts  Abdominal:      General: Bowel sounds are normal.      Palpations: Abdomen is soft. There is no mass. Tenderness: There is no abdominal tenderness. Musculoskeletal:      Cervical back: Normal range of motion. Right lower leg: No edema. Left lower leg: No edema. Lymphadenopathy:      Cervical: No cervical adenopathy. Skin:     Findings: No rash.    Neurological: Cranial Nerves: No cranial nerve deficit. Sensory: No sensory deficit. Psychiatric:         Mood and Affect: Mood normal.     Visit Vitals  /68 (BP 1 Location: Right arm, BP Patient Position: Sitting, BP Cuff Size: Large adult)   Pulse 67   Temp 97.9 °F (36.6 °C) (Temporal)   Resp 18   Ht 4' 11\" (1.499 m)   Wt 170 lb 12.8 oz (77.5 kg)   SpO2 95%   BMI 34.50 kg/m²      Aspects of this note may have been generated using voice recognition software. Despite editing, there may be some syntax errors   I have discussed the diagnosis with the patient and the intended plan as seen in the above orders. The patient has received an after-visit summary and questions were answered concerning future plans. I have discussed any recommended medication side effects and warnings with the patient as well.   She has expressed understanding of the diagnosis and plan    Hardeep Hastings MD

## 2022-11-12 PROBLEM — F79 INTELLECTUAL DISABILITY: Status: ACTIVE | Noted: 2022-11-12

## 2022-11-12 NOTE — ASSESSMENT & PLAN NOTE
Reviewed last bone density now measuring osteopenia. Fosamax stopped in 2021. Continue with calcium, vitamin D and exercise for bone health repeat bone density in 1 year.

## 2022-11-15 ENCOUNTER — TELEPHONE (OUTPATIENT)
Dept: INTERNAL MEDICINE CLINIC | Age: 72
End: 2022-11-15

## 2022-11-15 ENCOUNTER — VIRTUAL VISIT (OUTPATIENT)
Dept: INTERNAL MEDICINE CLINIC | Age: 72
End: 2022-11-15
Payer: MEDICARE

## 2022-11-15 DIAGNOSIS — R21 RASH: Primary | ICD-10-CM

## 2022-11-15 PROCEDURE — 1101F PT FALLS ASSESS-DOCD LE1/YR: CPT | Performed by: INTERNAL MEDICINE

## 2022-11-15 PROCEDURE — 3017F COLORECTAL CA SCREEN DOC REV: CPT | Performed by: INTERNAL MEDICINE

## 2022-11-15 PROCEDURE — 99213 OFFICE O/P EST LOW 20 MIN: CPT | Performed by: INTERNAL MEDICINE

## 2022-11-15 PROCEDURE — G0463 HOSPITAL OUTPT CLINIC VISIT: HCPCS | Performed by: INTERNAL MEDICINE

## 2022-11-15 PROCEDURE — 1090F PRES/ABSN URINE INCON ASSESS: CPT | Performed by: INTERNAL MEDICINE

## 2022-11-15 PROCEDURE — G9717 DOC PT DX DEP/BP F/U NT REQ: HCPCS | Performed by: INTERNAL MEDICINE

## 2022-11-15 PROCEDURE — G8427 DOCREV CUR MEDS BY ELIG CLIN: HCPCS | Performed by: INTERNAL MEDICINE

## 2022-11-15 PROCEDURE — G9899 SCRN MAM PERF RSLTS DOC: HCPCS | Performed by: INTERNAL MEDICINE

## 2022-11-15 RX ORDER — BETAMETHASONE VALERATE 1.2 MG/G
CREAM TOPICAL 2 TIMES DAILY
Qty: 45 G | Refills: 0 | Status: SHIPPED | OUTPATIENT
Start: 2022-11-15

## 2022-11-15 NOTE — PROGRESS NOTES
11/15/2022    Milly Rivers 1950 is a 67y.o. year old female established patient,   here for video visit to evaluate the following chief complaint(s):  Chief Complaint   Patient presents with    Rash           ASSESSMENT/PLAN:  Below is the assessment and plan developed based on review of pertinent history, physical exam, labs, studies, and medications. 1. Rash  -     betamethasone valerate (VALISONE) 0.1 % topical cream; Apply  to affected area two (2) times a day. 1-2 week intervals, Normal, Disp-45 g, R-0     LUE rash, possibly contact dermatitis  Advised trial of topical steroids, take pictures on phone to document progress  Advised on symptoms to monitor for of cellulitis such as spreading redness, warmth, pain  Discussed if not responding would be better to have in person eval    Follow-up and Dispositions    Return if symptoms worsen or fail to improve. SUBJECTIVE/OBJECTIVE:  Visit with patient and caregivers  Left upper arm splotchy red rash for about 5 days, it is slightly tender to the touch. Redness intensity has come and gone, doesn't seem to be itchy  No new meds or contacts that they can think of         Review of Systems   Constitutional:  Negative for chills and fever. Respiratory:  Negative for cough and shortness of breath. Cardiovascular:  Negative for chest pain, palpitations and leg swelling. Gastrointestinal:  Negative for abdominal pain. Skin:  Positive for rash. Negative for itching. Psychiatric/Behavioral:  Positive for memory loss.          Constitutional: [x] Appears well-developed and well-nourished [x] No apparent distress      [] Abnormal -     Mental status: [x] Alert and awake  [] Oriented to person/place/time [x] Able to follow commands    [] Abnormal -     Eyes:   EOM    [x]  Normal    [] Abnormal -   Sclera  [x]  Normal    [] Abnormal -          Discharge [x]  None visible   [] Abnormal -     HENT: [x] Normocephalic, atraumatic  [] Abnormal - [x] Mouth/Throat: Mucous membranes are moist    External Ears [x] Normal  [] Abnormal -    Neck: [x] No visualized mass [] Abnormal -     Pulmonary/Chest: [x] Respiratory effort normal   [x] No visualized signs of difficulty breathing or respiratory distress        [] Abnormal -      Musculoskeletal:   normal ROM elbow    Neurological:        [x] No Facial Asymmetry (Cranial nerve 7 motor function) (limited exam due to video visit)          [x] No gaze palsy        [] Abnormal -          Skin:            [x] Abnormal - patchy redness of left upper arm medially, not well demarcated borders, not raised, no punctures, scabs or skin openings           Psychiatric:       [x] Normal Affect [] Abnormal -              No flowsheet data found. The following sections were reviewed & updated as appropriate: Problem List, Allergies, PMH, PSH, FH, and SH. 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 1-2 week intervals 45 g 0    busPIRone (BUSPAR) 30 mg tablet Take 30 mg by mouth two (2) times a day. FLUoxetine (PROzac) 20 mg capsule Take 20 mg by mouth daily. QUEtiapine (SEROquel) 50 mg tablet Take 50 mg by mouth daily (with lunch).       Vitamin B-12 500 mcg tablet TAKE 2 TABLETS (1,000 MCG) BY MOUTH DAILY FOR SUPPLEMENT 60 Tablet 0    calcium carbonate (CALTREX) 600 mg calcium (1,500 mg) tablet TAKE 1 TABLET BY MOUTH TWICE DAILY FOR SUPPLEMENT 60 Tablet 0 esomeprazole (NEXIUM) 40 mg capsule TAKE 1 CAPSULE BY MOUTH ONCE DAILY FOR GERD 30 Capsule 0    Stool Softener 100 mg capsule TAKE 1 CAPSULE BY MOUTH ONCE A DAY FOR CONSTIPATION 30 Capsule 0    menthol-zinc oxide (Gold Bond Medicated) 0.15-1 % powder Apply to skin twice a day as need. 283 g 0    galantamine (RAZADYNE) 4 mg tablet TAKE 1 TABLET BY MOUTH TWICE DAILY FOR DEMENTIA 180 Tablet 1    FLUoxetine (PROzac) 40 mg capsule Take 40 mg by mouth daily. QUEtiapine (SEROquel) 100 mg tablet Take 100 mg by mouth two (2) times a day. nystatin (MYCOSTATIN) topical cream Apply  to affected area two (2) times a day. As needed for rash 30 g 5    ketoconazole (NIZORAL) 2 % topical cream APPLY TOPICALLY TO AFFECTED AREA ONCE DAILY 15 g 11    nystatin (MYCOSTATIN) powder APPLY POWDER TO AFFECTED AREA 4 TIMES A DAY 60 g 11    inulin (Fiber Choice, inulin,) 1.5 gram chew Take 1 Tablet by mouth two (2) times a day. 60 Tablet 11    ergocalciferol (ERGOCALCIFEROL) 1,250 mcg (50,000 unit) capsule TAKE 1 CAPSULE ONCE A WEEK ON FRIDAY. 5 Capsule 12    polyethylene glycol (ClearLax) 17 gram/dose powder MIX 1 CAPFUL (17gm - UP TO LINE) IN WATER AND DRINK ONCE DAILY AS NEEDED FOR CONSTIPATION 510 g 12    loratadine (CLARITIN) 10 mg tablet Take 1 Tab by mouth daily as needed for Allergies. 30 Tab PRN    hydrOXYzine HCl (ATARAX) 25 mg tablet Take  by mouth three (3) times daily as needed for Itching. Patient has no known allergies. Social History     Occupational History    Not on file   Tobacco Use    Smoking status: Never    Smokeless tobacco: Never   Vaping Use    Vaping Use: Never used   Substance and Sexual Activity    Alcohol use: No    Drug use: No    Sexual activity: Never                Time spent on telemed visit: 15 min    The patient was evaluated through a synchronous (real-time) audio-video encounter.  The patient (or guardian if applicable) is aware that this is a billable service, which includes applicable co-pays. Verbal consent to proceed has been obtained. The visit was conducted pursuant to the emergency declaration under the [facility], [address] [address] authority and the [employer] Act. Patient identification was verified, and a caregiver was present when appropriate. The patient was located at home in a state where the provider was licensed to provide care. Disclaimer:  Aspects of this note may have been generated using Dragon voice recognition software. Despite editing, there may be some syntax errors   We discussed the expected course, resolution and complications of the diagnosis(es) in detail. I have discussed any significant medication side effects and warnings with the patient when indicated. I advised them to contact the office if their condition worsens, changes or fails to improve as anticipated. Patient expressed understanding of the diagnosis and plan. An electronic signature was used to authenticate this note.   -- Ro German MD

## 2022-11-17 LAB
25(OH)D3+25(OH)D2 SERPL-MCNC: 30.4 NG/ML (ref 30–100)
ALBUMIN SERPL-MCNC: 4.1 G/DL (ref 3.7–4.7)
ALBUMIN/GLOB SERPL: 1.5 {RATIO} (ref 1.2–2.2)
ALP SERPL-CCNC: 100 IU/L (ref 44–121)
ALT SERPL-CCNC: 25 IU/L (ref 0–32)
AST SERPL-CCNC: 23 IU/L (ref 0–40)
BASOPHILS # BLD AUTO: 0.1 X10E3/UL (ref 0–0.2)
BASOPHILS NFR BLD AUTO: 1 %
BILIRUB SERPL-MCNC: 0.3 MG/DL (ref 0–1.2)
BUN SERPL-MCNC: 18 MG/DL (ref 8–27)
BUN/CREAT SERPL: 20 (ref 12–28)
CALCIUM SERPL-MCNC: 9 MG/DL (ref 8.7–10.3)
CHLORIDE SERPL-SCNC: 104 MMOL/L (ref 96–106)
CHOLEST SERPL-MCNC: 230 MG/DL (ref 100–199)
CO2 SERPL-SCNC: 25 MMOL/L (ref 20–29)
CREAT SERPL-MCNC: 0.91 MG/DL (ref 0.57–1)
EGFR: 67 ML/MIN/1.73
EOSINOPHIL # BLD AUTO: 0.1 X10E3/UL (ref 0–0.4)
EOSINOPHIL NFR BLD AUTO: 2 %
ERYTHROCYTE [DISTWIDTH] IN BLOOD BY AUTOMATED COUNT: 13.7 % (ref 11.7–15.4)
GLOBULIN SER CALC-MCNC: 2.7 G/DL (ref 1.5–4.5)
GLUCOSE SERPL-MCNC: 102 MG/DL (ref 70–99)
HCT VFR BLD AUTO: 41.4 % (ref 34–46.6)
HDLC SERPL-MCNC: 61 MG/DL
HGB BLD-MCNC: 13.3 G/DL (ref 11.1–15.9)
IMM GRANULOCYTES # BLD AUTO: 0 X10E3/UL (ref 0–0.1)
IMM GRANULOCYTES NFR BLD AUTO: 0 %
LDLC SERPL CALC-MCNC: 155 MG/DL (ref 0–99)
LYMPHOCYTES # BLD AUTO: 1.6 X10E3/UL (ref 0.7–3.1)
LYMPHOCYTES NFR BLD AUTO: 36 %
MCH RBC QN AUTO: 28.8 PG (ref 26.6–33)
MCHC RBC AUTO-ENTMCNC: 32.1 G/DL (ref 31.5–35.7)
MCV RBC AUTO: 90 FL (ref 79–97)
MONOCYTES # BLD AUTO: 0.4 X10E3/UL (ref 0.1–0.9)
MONOCYTES NFR BLD AUTO: 8 %
NEUTROPHILS # BLD AUTO: 2.4 X10E3/UL (ref 1.4–7)
NEUTROPHILS NFR BLD AUTO: 53 %
PLATELET # BLD AUTO: 148 X10E3/UL (ref 150–450)
POTASSIUM SERPL-SCNC: 4.3 MMOL/L (ref 3.5–5.2)
PROT SERPL-MCNC: 6.8 G/DL (ref 6–8.5)
RBC # BLD AUTO: 4.62 X10E6/UL (ref 3.77–5.28)
SODIUM SERPL-SCNC: 141 MMOL/L (ref 134–144)
TRIGL SERPL-MCNC: 83 MG/DL (ref 0–149)
VLDLC SERPL CALC-MCNC: 14 MG/DL (ref 5–40)
WBC # BLD AUTO: 4.5 X10E3/UL (ref 3.4–10.8)

## 2022-11-25 RX ORDER — CYANOCOBALAMIN (VITAMIN B-12) 500 MCG
TABLET ORAL
Qty: 60 TABLET | Refills: 0 | Status: SHIPPED | OUTPATIENT
Start: 2022-11-25

## 2022-11-25 RX ORDER — CALCIUM CARBONATE 600 MG
TABLET ORAL
Qty: 60 TABLET | Refills: 0 | Status: SHIPPED | OUTPATIENT
Start: 2022-11-25

## 2022-11-25 RX ORDER — ESOMEPRAZOLE MAGNESIUM 40 MG/1
CAPSULE, DELAYED RELEASE ORAL
Qty: 30 CAPSULE | Refills: 0 | Status: SHIPPED | OUTPATIENT
Start: 2022-11-25

## 2022-12-06 ENCOUNTER — HOSPITAL ENCOUNTER (OUTPATIENT)
Dept: MAMMOGRAPHY | Age: 72
Discharge: HOME | End: 2022-12-06
Attending: INTERNAL MEDICINE
Payer: MEDICARE

## 2022-12-06 DIAGNOSIS — Z12.31 ENCOUNTER FOR SCREENING MAMMOGRAM FOR MALIGNANT NEOPLASM OF BREAST: ICD-10-CM

## 2022-12-06 PROCEDURE — 77067 SCR MAMMO BI INCL CAD: CPT

## 2022-12-16 ENCOUNTER — DOCUMENTATION ONLY (OUTPATIENT)
Dept: INTERNAL MEDICINE CLINIC | Age: 72
End: 2022-12-16

## 2022-12-16 NOTE — PROGRESS NOTES
Faxed physical form and last office note to [facility] @ [phone]. Confirmation received.

## 2022-12-27 RX ORDER — POLYETHYLENE GLYCOL 3350 17 G/17G
POWDER, FOR SOLUTION ORAL
Qty: 510 G | Refills: 12 | Status: SHIPPED | OUTPATIENT
Start: 2022-12-27

## 2022-12-28 RX ORDER — DOCUSATE SODIUM 100 MG
CAPSULE ORAL
Qty: 30 CAPSULE | Refills: 0 | Status: SHIPPED | OUTPATIENT
Start: 2022-12-28

## 2022-12-28 RX ORDER — CYANOCOBALAMIN (VITAMIN B-12) 500 MCG
TABLET ORAL
Qty: 60 TABLET | Refills: 0 | Status: SHIPPED | OUTPATIENT
Start: 2022-12-28

## 2022-12-28 RX ORDER — CALCIUM CARBONATE 600 MG
TABLET ORAL
Qty: 60 TABLET | Refills: 0 | Status: SHIPPED | OUTPATIENT
Start: 2022-12-28

## 2022-12-28 RX ORDER — ESOMEPRAZOLE MAGNESIUM 40 MG/1
CAPSULE, DELAYED RELEASE ORAL
Qty: 30 CAPSULE | Refills: 0 | Status: SHIPPED | OUTPATIENT
Start: 2022-12-28

## 2023-01-13 ENCOUNTER — TELEPHONE (OUTPATIENT)
Dept: INTERNAL MEDICINE CLINIC | Age: 73
End: 2023-01-13

## 2023-01-13 NOTE — TELEPHONE ENCOUNTER
Needs appt if available today. If no appt in our office and If facility feels she needs to be seen this weekend urgent care is best option. Otherwise appt next week in office.

## 2023-01-13 NOTE — TELEPHONE ENCOUNTER
Reason for call:  Received a call from Amrit Reynolds care giver with [facility]. Pt has being peeing her self.  Pt is not telling anyone when she needs to go     Is this a new problem: yes     Date of last appointment:  11/15/2022     Can we respond via [employer]: no    Best call back number: Matt Birch [phone]

## 2023-01-26 RX ORDER — DOCUSATE SODIUM 100 MG
CAPSULE ORAL
Qty: 30 CAPSULE | Refills: 0 | Status: SHIPPED | OUTPATIENT
Start: 2023-01-26

## 2023-01-26 RX ORDER — CYANOCOBALAMIN (VITAMIN B-12) 500 MCG
TABLET ORAL
Qty: 60 TABLET | Refills: 0 | Status: SHIPPED | OUTPATIENT
Start: 2023-01-26

## 2023-01-26 RX ORDER — ESOMEPRAZOLE MAGNESIUM 40 MG/1
CAPSULE, DELAYED RELEASE ORAL
Qty: 30 CAPSULE | Refills: 0 | Status: SHIPPED | OUTPATIENT
Start: 2023-01-26

## 2023-01-26 RX ORDER — CALCIUM CARBONATE 600 MG
TABLET ORAL
Qty: 60 TABLET | Refills: 0 | Status: SHIPPED | OUTPATIENT
Start: 2023-01-26

## 2023-01-31 ENCOUNTER — OFFICE VISIT (OUTPATIENT)
Dept: NEUROLOGY | Age: 73
End: 2023-01-31
Payer: MEDICARE

## 2023-01-31 VITALS
RESPIRATION RATE: 18 BRPM | HEART RATE: 68 BPM | OXYGEN SATURATION: 97 % | BODY MASS INDEX: 34.5 KG/M2 | DIASTOLIC BLOOD PRESSURE: 70 MMHG | SYSTOLIC BLOOD PRESSURE: 119 MMHG | HEIGHT: 59 IN

## 2023-01-31 DIAGNOSIS — R62.50 DEVELOPMENTAL DELAY: ICD-10-CM

## 2023-01-31 DIAGNOSIS — F03.90 DEMENTIA WITHOUT BEHAVIORAL DISTURBANCE (HCC): Primary | ICD-10-CM

## 2023-01-31 PROCEDURE — G9899 SCRN MAM PERF RSLTS DOC: HCPCS

## 2023-01-31 PROCEDURE — 3017F COLORECTAL CA SCREEN DOC REV: CPT

## 2023-01-31 PROCEDURE — 99215 OFFICE O/P EST HI 40 MIN: CPT

## 2023-01-31 PROCEDURE — G8427 DOCREV CUR MEDS BY ELIG CLIN: HCPCS

## 2023-01-31 PROCEDURE — G8417 CALC BMI ABV UP PARAM F/U: HCPCS

## 2023-01-31 PROCEDURE — 1101F PT FALLS ASSESS-DOCD LE1/YR: CPT

## 2023-01-31 PROCEDURE — G8536 NO DOC ELDER MAL SCRN: HCPCS

## 2023-01-31 PROCEDURE — G9717 DOC PT DX DEP/BP F/U NT REQ: HCPCS

## 2023-01-31 PROCEDURE — 1123F ACP DISCUSS/DSCN MKR DOCD: CPT

## 2023-01-31 PROCEDURE — 1090F PRES/ABSN URINE INCON ASSESS: CPT

## 2023-01-31 RX ORDER — GALANTAMINE 4 MG/1
TABLET, FILM COATED ORAL
Qty: 180 TABLET | Refills: 1 | Status: SHIPPED | OUTPATIENT
Start: 2023-01-31

## 2023-01-31 NOTE — PROGRESS NOTES
Chief Complaint   Patient presents with    Follow-up    Dementia     Caregiver reports patient is easily confused    Visit Vitals  /70 (BP 1 Location: Left arm, BP Patient Position: Sitting, BP Cuff Size: Adult)   Pulse 68   Resp 18   Ht 4' 11\" (1.499 m)   SpO2 97%   BMI 34.50 kg/m²

## 2023-02-01 ENCOUNTER — OFFICE VISIT (OUTPATIENT)
Dept: INTERNAL MEDICINE CLINIC | Age: 73
End: 2023-02-01
Payer: MEDICARE

## 2023-02-01 ENCOUNTER — TELEPHONE (OUTPATIENT)
Dept: INTERNAL MEDICINE CLINIC | Age: 73
End: 2023-02-01

## 2023-02-01 VITALS
WEIGHT: 174.4 LBS | RESPIRATION RATE: 16 BRPM | OXYGEN SATURATION: 97 % | DIASTOLIC BLOOD PRESSURE: 71 MMHG | HEIGHT: 59 IN | BODY MASS INDEX: 35.16 KG/M2 | HEART RATE: 66 BPM | TEMPERATURE: 98.1 F | SYSTOLIC BLOOD PRESSURE: 104 MMHG

## 2023-02-01 DIAGNOSIS — F79 INTELLECTUAL DISABILITY: ICD-10-CM

## 2023-02-01 DIAGNOSIS — E66.01 SEVERE OBESITY (BMI 35.0-39.9) WITH COMORBIDITY (HCC): ICD-10-CM

## 2023-02-01 DIAGNOSIS — M81.0 AGE-RELATED OSTEOPOROSIS WITHOUT CURRENT PATHOLOGICAL FRACTURE: ICD-10-CM

## 2023-02-01 DIAGNOSIS — F03.918: ICD-10-CM

## 2023-02-01 DIAGNOSIS — F33.42 RECURRENT MAJOR DEPRESSIVE DISORDER, IN FULL REMISSION (HCC): ICD-10-CM

## 2023-02-01 DIAGNOSIS — R32 URINARY INCONTINENCE, UNSPECIFIED TYPE: Primary | ICD-10-CM

## 2023-02-01 LAB
BILIRUB UR QL STRIP: NEGATIVE
GLUCOSE UR-MCNC: NEGATIVE MG/DL
KETONES P FAST UR STRIP-MCNC: NEGATIVE MG/DL
PH UR STRIP: 6.5 [PH] (ref 4.6–8)
PROT UR QL STRIP: NEGATIVE
SP GR UR STRIP: 1.02 (ref 1–1.03)
UA UROBILINOGEN AMB POC: NORMAL (ref 0.2–1)
URINALYSIS CLARITY POC: NORMAL
URINALYSIS COLOR POC: NORMAL
URINE BLOOD POC: NEGATIVE
URINE LEUKOCYTES POC: NORMAL
URINE NITRITES POC: NEGATIVE

## 2023-02-01 PROCEDURE — G8417 CALC BMI ABV UP PARAM F/U: HCPCS | Performed by: INTERNAL MEDICINE

## 2023-02-01 PROCEDURE — 99213 OFFICE O/P EST LOW 20 MIN: CPT | Performed by: INTERNAL MEDICINE

## 2023-02-01 PROCEDURE — 81001 URINALYSIS AUTO W/SCOPE: CPT | Performed by: INTERNAL MEDICINE

## 2023-02-01 PROCEDURE — G8427 DOCREV CUR MEDS BY ELIG CLIN: HCPCS | Performed by: INTERNAL MEDICINE

## 2023-02-01 PROCEDURE — G9899 SCRN MAM PERF RSLTS DOC: HCPCS | Performed by: INTERNAL MEDICINE

## 2023-02-01 PROCEDURE — G0463 HOSPITAL OUTPT CLINIC VISIT: HCPCS | Performed by: INTERNAL MEDICINE

## 2023-02-01 PROCEDURE — 1090F PRES/ABSN URINE INCON ASSESS: CPT | Performed by: INTERNAL MEDICINE

## 2023-02-01 PROCEDURE — G8536 NO DOC ELDER MAL SCRN: HCPCS | Performed by: INTERNAL MEDICINE

## 2023-02-01 PROCEDURE — 1101F PT FALLS ASSESS-DOCD LE1/YR: CPT | Performed by: INTERNAL MEDICINE

## 2023-02-01 PROCEDURE — 3017F COLORECTAL CA SCREEN DOC REV: CPT | Performed by: INTERNAL MEDICINE

## 2023-02-01 PROCEDURE — G9717 DOC PT DX DEP/BP F/U NT REQ: HCPCS | Performed by: INTERNAL MEDICINE

## 2023-02-01 RX ORDER — UREA 10 %
1 LOTION (ML) TOPICAL 2 TIMES DAILY
Qty: 180 TABLET | Refills: 3 | Status: SHIPPED | OUTPATIENT
Start: 2023-02-01

## 2023-02-01 NOTE — PROGRESS NOTES
Chasidy Yee is a 67 y.o. female who was seen today for a follow-up visit. Assessment & Plan:   1. Urinary incontinence, unspecified type  Comments: We will give order for as needed use of depends. Leukocytes on urine dip we will send for urinalysis reflex to culture to rule out urinary tract infection as a cause for her current incontinence. Orders:  -     AMB POC URINALYSIS DIP STICK AUTO W/ MICRO  -     URINALYSIS W/ REFLEX CULTURE; Future  2. Severe obesity (BMI 35.0-39. 9) with comorbidity ([address]  3. Recurrent major depressive disorder, in full remission ([facility] [address]  Assessment & Plan:   monitored by specialist. No acute findings meriting change in the plan  4. Intellectual disability  Assessment & Plan:   Resides in a group home and needs assistance with medication management. 5. 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 No acute findings meriting change in the plan  6. Age-related osteoporosis without current pathological fracture  Assessment & Plan:   Reviewed last bone density now measuring osteopenia. Fosamax stopped in 2021. Continue with calcium, vitamin D and exercise for bone health repeat bone density every 2 years. Calcium can be in chewable form. Follow-up and Dispositions    Return if symptoms worsen or fail to improve. Subjective:   Chasidy Yee was seen for Incontinence  Patient is a poor story and in part of her history is from caretaker who brings her to her visit today. She lives in a group home. She has had more problems with urinary incontinence and they are requesting a Prn order for depends. Edith denies any dysuria or problems with frequency urgency. They are trying to get her to drink more water but having a difficult time for that and would like to see if they could use Crystal light in her water to see if that would improve her fluid intake.   She also apparently does not like the taste of her calcium tablet and would like to have this changed to a chewable. Review of Systems   Constitutional:  Negative for fever. Gastrointestinal:  Negative for abdominal pain. Psychiatric/Behavioral:  Negative for depression.   - per HPI    Physical Exam  Vitals and nursing note reviewed. Constitutional:       General: She is not in acute distress. Appearance: She is well-developed. HENT:      Head: Normocephalic and atraumatic. Cardiovascular:      Rate and Rhythm: Normal rate and regular rhythm. Pulmonary:      Effort: Pulmonary effort is normal.      Breath sounds: Normal breath sounds. No wheezing. Abdominal:      General: Bowel sounds are normal. There is no distension. Palpations: Abdomen is soft. There is no mass. Tenderness: There is no abdominal tenderness. Musculoskeletal:      Right lower leg: No edema. Left lower leg: No edema. Psychiatric:         Mood and Affect: Mood normal.     Visit Vitals  /71   Pulse 66   Temp 98.1 °F (36.7 °C) (Temporal)   Resp 16   Ht 4' 11\" (1.499 m)   Wt 174 lb 6.4 oz (79.1 kg)   SpO2 97%   BMI 35.22 kg/m²      Aspects of this note may have been generated using voice recognition software. Despite editing, there may be some syntax errors   I have discussed the diagnosis with the patient and the intended plan as seen in the above orders. The patient has received an after-visit summary and questions were answered concerning future plans. I have discussed any recommended medication side effects and warnings with the patient as well.   She has expressed understanding of the diagnosis and plan    Madison Garcia MD

## 2023-02-01 NOTE — ASSESSMENT & PLAN NOTE
Reviewed last bone density now measuring osteopenia. Fosamax stopped in 2021. Continue with calcium, vitamin D and exercise for bone health repeat bone density every 2 years. Calcium can be in chewable form.

## 2023-02-01 NOTE — TELEPHONE ENCOUNTER
Spoke with caregiver at residence, office will be notified with DME supplier and fax # to send order.

## 2023-02-02 LAB
APPEARANCE UR: CLEAR
BACTERIA URNS QL MICRO: NEGATIVE /HPF
BILIRUB UR QL: NEGATIVE
COLOR UR: ABNORMAL
EPITH CASTS URNS QL MICRO: ABNORMAL /LPF
GLUCOSE UR STRIP.AUTO-MCNC: NEGATIVE MG/DL
HGB UR QL STRIP: NEGATIVE
KETONES UR QL STRIP.AUTO: NEGATIVE MG/DL
LEUKOCYTE ESTERASE UR QL STRIP.AUTO: ABNORMAL
NITRITE UR QL STRIP.AUTO: NEGATIVE
PH UR STRIP: 6.5 (ref 5–8)
PROT UR STRIP-MCNC: NEGATIVE MG/DL
RBC #/AREA URNS HPF: ABNORMAL /HPF (ref 0–5)
SP GR UR REFRACTOMETRY: 1.02 (ref 1–1.03)
UA: UC IF INDICATED,UAUC: ABNORMAL
UROBILINOGEN UR QL STRIP.AUTO: 1 EU/DL (ref 0.2–1)
WBC URNS QL MICRO: ABNORMAL /HPF (ref 0–4)

## 2023-02-08 ENCOUNTER — DOCUMENTATION ONLY (OUTPATIENT)
Dept: INTERNAL MEDICINE CLINIC | Age: 73
End: 2023-02-08

## 2023-02-15 DIAGNOSIS — R32 URINARY INCONTINENCE, UNSPECIFIED TYPE: ICD-10-CM

## 2023-02-16 LAB
APPEARANCE UR: CLEAR
BACTERIA URNS QL MICRO: NEGATIVE /HPF
BILIRUB UR QL: NEGATIVE
COLOR UR: ABNORMAL
EPITH CASTS URNS QL MICRO: ABNORMAL /LPF
GLUCOSE UR STRIP.AUTO-MCNC: NEGATIVE MG/DL
HGB UR QL STRIP: NEGATIVE
HYALINE CASTS URNS QL MICRO: ABNORMAL /LPF (ref 0–5)
KETONES UR QL STRIP.AUTO: NEGATIVE MG/DL
LEUKOCYTE ESTERASE UR QL STRIP.AUTO: ABNORMAL
NITRITE UR QL STRIP.AUTO: NEGATIVE
PH UR STRIP: 6.5 (ref 5–8)
PROT UR STRIP-MCNC: NEGATIVE MG/DL
RBC #/AREA URNS HPF: ABNORMAL /HPF (ref 0–5)
SP GR UR REFRACTOMETRY: 1.03 (ref 1–1.03)
UA: UC IF INDICATED,UAUC: ABNORMAL
UROBILINOGEN UR QL STRIP.AUTO: 1 EU/DL (ref 0.2–1)
WBC URNS QL MICRO: ABNORMAL /HPF (ref 0–4)

## 2023-02-17 NOTE — PROGRESS NOTES
Notify patient (developmental delay) and caregiver that her repeat urine test is negative for infection. No further testing at this time.

## 2023-02-22 DIAGNOSIS — B37.2 CANDIDAL INTERTRIGO: ICD-10-CM

## 2023-02-23 RX ORDER — NYSTATIN 100000 [USP'U]/G
POWDER TOPICAL
Qty: 60 G | Refills: 0 | Status: SHIPPED | OUTPATIENT
Start: 2023-02-23

## 2023-02-23 RX ORDER — CYANOCOBALAMIN (VITAMIN B-12) 500 MCG
TABLET ORAL
Qty: 60 TABLET | Refills: 0 | Status: SHIPPED | OUTPATIENT
Start: 2023-02-23

## 2023-02-23 RX ORDER — KETOCONAZOLE 20 MG/G
CREAM TOPICAL
Qty: 15 G | Refills: 0 | Status: SHIPPED | OUTPATIENT
Start: 2023-02-23

## 2023-02-27 ENCOUNTER — TELEPHONE (OUTPATIENT)
Dept: INTERNAL MEDICINE CLINIC | Age: 73
End: 2023-02-27

## 2023-03-22 RX ORDER — DOCUSATE SODIUM 100 MG/1
CAPSULE, LIQUID FILLED ORAL
Qty: 30 CAPSULE | Refills: 0 | Status: SHIPPED | OUTPATIENT
Start: 2023-03-22

## 2023-03-22 RX ORDER — ESOMEPRAZOLE MAGNESIUM 40 MG/1
CAPSULE, DELAYED RELEASE ORAL
Qty: 30 CAPSULE | Refills: 0 | Status: SHIPPED | OUTPATIENT
Start: 2023-03-22

## 2023-03-22 RX ORDER — CYANOCOBALAMIN (VITAMIN B-12) 500 MCG
TABLET ORAL
Qty: 60 TABLET | Refills: 0 | Status: SHIPPED | OUTPATIENT
Start: 2023-03-22

## 2023-04-14 DIAGNOSIS — B37.2 CANDIDAL INTERTRIGO: ICD-10-CM

## 2023-04-17 RX ORDER — KETOCONAZOLE 20 MG/G
CREAM TOPICAL
Qty: 15 G | Refills: 0 | Status: SHIPPED | OUTPATIENT
Start: 2023-04-17 | End: 2023-04-18

## 2023-04-18 DIAGNOSIS — B37.2 CANDIDAL INTERTRIGO: ICD-10-CM

## 2023-04-18 RX ORDER — KETOCONAZOLE 20 MG/G
CREAM TOPICAL
Qty: 15 G | Refills: 0 | Status: SHIPPED | OUTPATIENT
Start: 2023-04-18

## 2023-04-19 ENCOUNTER — VIRTUAL VISIT (OUTPATIENT)
Dept: INTERNAL MEDICINE CLINIC | Age: 73
End: 2023-04-19
Payer: MEDICARE

## 2023-04-19 DIAGNOSIS — S40.861A INSECT BITE OF RIGHT UPPER ARM, INITIAL ENCOUNTER: Primary | ICD-10-CM
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
## 2023-04-19 PROCEDURE — 99214 OFFICE O/P EST MOD 30 MIN: CPT | Performed by: INTERNAL MEDICINE

## 2023-04-19 PROCEDURE — G0463 HOSPITAL OUTPT CLINIC VISIT: HCPCS | Performed by: INTERNAL MEDICINE

## 2023-04-19 PROCEDURE — 3017F COLORECTAL CA SCREEN DOC REV: CPT | Performed by: INTERNAL MEDICINE

## 2023-04-19 PROCEDURE — G8427 DOCREV CUR MEDS BY ELIG CLIN: HCPCS | Performed by: INTERNAL MEDICINE

## 2023-04-19 PROCEDURE — 1090F PRES/ABSN URINE INCON ASSESS: CPT | Performed by: INTERNAL MEDICINE

## 2023-04-19 PROCEDURE — G9899 SCRN MAM PERF RSLTS DOC: HCPCS | Performed by: INTERNAL MEDICINE

## 2023-04-19 PROCEDURE — 1101F PT FALLS ASSESS-DOCD LE1/YR: CPT | Performed by: INTERNAL MEDICINE

## 2023-04-19 PROCEDURE — G9717 DOC PT DX DEP/BP F/U NT REQ: HCPCS | Performed by: INTERNAL MEDICINE

## 2023-04-19 NOTE — PROGRESS NOTES
Tanvi Marvin is a 67 y.o. female who was seen by synchronous (real-time) audio-video technology on 4/19/2023. She confirmed that, for purposes of billing, this is a virtual visit with her provider for which we will submit a claim for reimbursement to her insurance company. She is aware that she will be responsible for any copays, coinsurance amounts or other amounts not covered by her insurance company. Do you accept - {Yes/no:05187::\"YES\"}    This visit was completed was completed virtually using {Yes/no:21148::\"Doxy. Me\",\"MyChart\"}        Subjective:   Tanvi Marvin was seen for Rash      Rash present at posterior right arm. Discrete punctate lesions at the upper posterior left arm     Some lesions at her lower back. Not itching, improving with hydrocortisone. No pain, no fevers      Prior to Admission medications    Medication Sig Start Date End Date Taking? Authorizing Provider   ketoconazole (NIZORAL) 2 % topical cream APPLY TOPICALLY TO AFFECTED AREA(S) DAILY FOR SKIN CONDITION 4/18/23  Yes Asia WALSH NP   esomeprazole (NEXIUM) 40 mg capsule TAKE 1 CAPSULE BY MOUTH ONCE DAILY FOR GERD 3/22/23  Yes Asia WALSH NP   Vitamin B-12 500 mcg tablet TAKE 2 TABLETS (1,000 MCG) BY MOUTH DAILY FOR SUPPLEMENT 3/22/23  Yes Asia WALSH NP   docusate sodium (COLACE) 100 mg capsule TAKE 1 CAPSULE BY MOUTH ONCE A DAY FOR CONSTIPATION 3/22/23  Yes Asia WALSH NP   nystatin (MYCOSTATIN) powder APPLY POWDER TOPICALLY TO AFFECTED AREA(S) 4 TIMES A DAY FOR FUNGAL 2/23/23  Yes Asia WALSH NP   calcium carbonate (Calcium 500) 500 mg calcium (1,250 mg) chewable tablet Take 1 Tablet by mouth two (2) times a day.  2/1/23  Yes Kaitlynn Thompson MD   galantamine (RAZADYNE) 4 mg tablet TAKE 1 TABLET BY MOUTH TWICE DAILY FOR DEMENTIA 1/31/23  Yes Krupa Pena APRN   polyethylene glycol (MIRALAX) 17 gram/dose powder MIX 1 CAPFUL (17GMS) IN 8OZS OF LIQUID & DRINK DAILY AS NEEDED FOR CONSTIPATION. 12/27/22  Yes Juan Carlos Kathleen MD   ergocalciferol (ERGOCALCIFEROL) 1,250 mcg (50,000 unit) capsule TAKE 1 CAPSULE BY MOUTH ONCE WEEKLY ON FRIDAYS FOR VIT D. DEFICIENCY 11/22/22  Yes Juan Carlos Kathleen MD   busPIRone (BUSPAR) 30 mg tablet Take 1 Tablet by mouth two (2) times a day. 10/25/22  Yes Provider, Historical   FLUoxetine (PROzac) 20 mg capsule Take 1 Capsule by mouth daily. 10/25/22  Yes Provider, Historical   QUEtiapine (SEROquel) 50 mg tablet Take 1 Tablet by mouth daily (with lunch). 10/25/22  Yes Provider, Historical   menthol-zinc oxide (Gold Bond Medicated) 0.15-1 % powder Apply to skin twice a day as need. 8/31/22  Yes Juan Carlos Kathleen MD   FLUoxetine (PROzac) 40 mg capsule Take 1 Capsule by mouth daily. 6/1/22  Yes Provider, Historical   QUEtiapine (SEROquel) 100 mg tablet Take 1 Tablet by mouth two (2) times a day. 6/1/22  Yes Provider, Historical   nystatin (MYCOSTATIN) topical cream Apply  to affected area two (2) times a day. As needed for rash 6/7/22  Yes Juan Carlos Kathleen MD   hydrOXYzine HCl (ATARAX) 25 mg tablet Take  by mouth three (3) times daily as needed for Itching. Yes Provider, Historical   inulin 1.5 gram chew CHEW 1 TABLET BY MOUTH TWICE DAILY FOR FIBER  Patient not taking: Reported on 4/19/2023 12/28/22   Donnalee Shingles, NP   loratadine (CLARITIN) 10 mg tablet Take 1 Tab by mouth daily as needed for Allergies.   Patient not taking: Reported on 4/19/2023 4/14/20   Juan Carlos Kathleen MD       No Known Allergies    {Choose one or more SmartLinks; press DELETE if none desired:1237619}       ROS {:66042::- per HPI}      Objective:     General: {:40141::\"alert\",\"cooperative\",\"no distress\"}   Mental  status: {:128541::\"normal mood, behavior, speech, dress, motor activity, and thought processes\",\"able to follow commands\":1}   Eyes: {eye exam abnormal - (Optional):59312::\"EOM intact\",\"normal sclera\"}   Mouth: {:852750::\"mucous membranes moist\"}   Neck: {: 569286::\"no visualized mass\"}   Resp: {PULM - obs findings:43947::\"normal effort\",\"no respiratory distress\":1}   Neuro: {neuro - obs:71254::\"no gross deficits\":1}   Musculoskeletal: {extremities abnormal (Optional):34269::\"normal ROM of neck\",\"normal gait w/o ataxia\"}   Skin: {:057933::\"no discoloration or lesions of concern on visible areas\":1}   Psychiatric: {mental status exam abnormal (Optional):62973::\"normal affect\",\"no hallucinations\"}           Assessment & Plan:   1. Insect bite of right upper arm, initial encounter          CPT Codes 96063-61189 for Established Patients may apply to this Telehealth Visit  {Time-based coding, delete if not needed:50028::\"I spent at least 15 minutes with this established patient, and >50% of the time was spent counseling and/or coordinating care regarding ***\"}      Due to this being a TeleHealth evaluation, many elements of the physical examination are unable to be assessed. Pursuant to the emergency declaration under the [address] [place] [address], [facility] waiver authority and the [employer] Act, this Virtual  Visit was conducted, with patient's consent, to reduce the patient's risk of exposure to COVID-19 and provide continuity of care for an established patient. Services were provided through a video synchronous discussion virtually to substitute for in-person clinic visit. We discussed the expected course, resolution and complications of the diagnosis(es) in detail. Medication risks, benefits, costs, interactions, and alternatives were discussed as indicated. I advised her to contact the office if her condition worsens, changes or fails to improve as anticipated. She expressed understanding with the diagnosis(es) and plan.      Monika Boudreaux MD

## 2023-04-25 RX ORDER — DOCUSATE SODIUM 100 MG/1
CAPSULE, LIQUID FILLED ORAL
Qty: 30 CAPSULE | Refills: 0 | Status: SHIPPED | OUTPATIENT
Start: 2023-04-25

## 2023-04-25 RX ORDER — ESOMEPRAZOLE MAGNESIUM 40 MG/1
CAPSULE, DELAYED RELEASE ORAL
Qty: 30 CAPSULE | Refills: 0 | Status: SHIPPED | OUTPATIENT
Start: 2023-04-25

## 2023-04-25 RX ORDER — CYANOCOBALAMIN (VITAMIN B-12) 500 MCG
TABLET ORAL
Qty: 60 TABLET | Refills: 0 | Status: SHIPPED | OUTPATIENT
Start: 2023-04-25

## 2023-05-09 ENCOUNTER — OFFICE VISIT (OUTPATIENT)
Age: 73
End: 2023-05-09
Payer: MEDICARE

## 2023-05-09 VITALS
WEIGHT: 173.2 LBS | TEMPERATURE: 97.8 F | SYSTOLIC BLOOD PRESSURE: 106 MMHG | HEIGHT: 59 IN | BODY MASS INDEX: 34.92 KG/M2 | RESPIRATION RATE: 16 BRPM | HEART RATE: 69 BPM | DIASTOLIC BLOOD PRESSURE: 68 MMHG | OXYGEN SATURATION: 96 %

## 2023-05-09 DIAGNOSIS — I95.2 HYPOTENSION DUE TO DRUGS: Primary | ICD-10-CM

## 2023-05-09 DIAGNOSIS — F33.42 RECURRENT MAJOR DEPRESSIVE DISORDER, IN FULL REMISSION (HCC): ICD-10-CM

## 2023-05-09 DIAGNOSIS — E78.00 HYPERCHOLESTEROLEMIA: ICD-10-CM

## 2023-05-09 DIAGNOSIS — F03.918: ICD-10-CM

## 2023-05-09 DIAGNOSIS — M81.6 LOCALIZED OSTEOPOROSIS WITHOUT CURRENT PATHOLOGICAL FRACTURE: ICD-10-CM

## 2023-05-09 DIAGNOSIS — K21.9 GASTROESOPHAGEAL REFLUX DISEASE WITHOUT ESOPHAGITIS: ICD-10-CM

## 2023-05-09 PROCEDURE — 3017F COLORECTAL CA SCREEN DOC REV: CPT | Performed by: INTERNAL MEDICINE

## 2023-05-09 PROCEDURE — 1123F ACP DISCUSS/DSCN MKR DOCD: CPT | Performed by: INTERNAL MEDICINE

## 2023-05-09 PROCEDURE — 99214 OFFICE O/P EST MOD 30 MIN: CPT | Performed by: INTERNAL MEDICINE

## 2023-05-09 PROCEDURE — 1090F PRES/ABSN URINE INCON ASSESS: CPT | Performed by: INTERNAL MEDICINE

## 2023-05-09 PROCEDURE — 1036F TOBACCO NON-USER: CPT | Performed by: INTERNAL MEDICINE

## 2023-05-09 PROCEDURE — G8427 DOCREV CUR MEDS BY ELIG CLIN: HCPCS | Performed by: INTERNAL MEDICINE

## 2023-05-09 PROCEDURE — G8417 CALC BMI ABV UP PARAM F/U: HCPCS | Performed by: INTERNAL MEDICINE

## 2023-05-09 PROCEDURE — G8399 PT W/DXA RESULTS DOCUMENT: HCPCS | Performed by: INTERNAL MEDICINE

## 2023-05-09 RX ORDER — ALENDRONATE SODIUM 70 MG/1
70 TABLET ORAL
COMMUNITY
Start: 2018-02-22 | End: 2023-05-09 | Stop reason: CLARIF

## 2023-05-09 RX ORDER — PRAZOSIN HYDROCHLORIDE 1 MG/1
1 CAPSULE ORAL NIGHTLY
COMMUNITY
Start: 2023-05-02 | End: 2023-05-09 | Stop reason: SINTOL

## 2023-05-09 RX ORDER — ALPRAZOLAM 0.5 MG/1
0.5 TABLET ORAL NIGHTLY PRN
COMMUNITY
Start: 2018-03-01 | End: 2023-05-09 | Stop reason: CLARIF

## 2023-05-09 RX ORDER — CALCIUM CARBONATE 500 MG/1
1 TABLET, CHEWABLE ORAL 2 TIMES DAILY
COMMUNITY
Start: 2023-03-22

## 2023-05-09 SDOH — ECONOMIC STABILITY: FOOD INSECURITY: WITHIN THE PAST 12 MONTHS, YOU WORRIED THAT YOUR FOOD WOULD RUN OUT BEFORE YOU GOT MONEY TO BUY MORE.: NEVER TRUE

## 2023-05-09 SDOH — ECONOMIC STABILITY: INCOME INSECURITY: HOW HARD IS IT FOR YOU TO PAY FOR THE VERY BASICS LIKE FOOD, HOUSING, MEDICAL CARE, AND HEATING?: NOT HARD AT ALL

## 2023-05-09 SDOH — ECONOMIC STABILITY: FOOD INSECURITY: WITHIN THE PAST 12 MONTHS, THE FOOD YOU BOUGHT JUST DIDN'T LAST AND YOU DIDN'T HAVE MONEY TO GET MORE.: NEVER TRUE

## 2023-05-09 SDOH — ECONOMIC STABILITY: HOUSING INSECURITY
IN THE LAST 12 MONTHS, WAS THERE A TIME WHEN YOU DID NOT HAVE A STEADY PLACE TO SLEEP OR SLEPT IN A SHELTER (INCLUDING NOW)?: NO

## 2023-05-09 NOTE — PROGRESS NOTES
Anthony Kelly is a 67 y.o. female who was seen in clinic today (5/9/2023). Assessment & Plan:   Below is the assessment and plan developed based on review of pertinent history, physical exam, labs, studies, and medications. 1. Hypotension due to drugs  Comments:  Stop prazocin due to low blood pressure on medication medication had been started after patient had complained of nightmares recently to her psychiatrist.  Reviewed with caregiver and they will also notify her psychiatrist.  2. Localized osteoporosis without current pathological fracture  Assessment & Plan:   Continue with calcium, vitamin D supplementation. We will check vitamin D level in 6 months. Reviewed last lab work. Fosamax has been stopped as of 2021 after 4 years of therapy. Orders:  -     DEXA BONE DENSITY AXIAL SKELETON; Future  3. Gastroesophageal reflux disease without esophagitis  Assessment & Plan:   Well-controlled, continue current medications  4. Recurrent major depressive disorder, in full remission [facility])  Assessment & Plan:  Stable and managed by psychiatry. 5. 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)  Assessment & Plan:   Monitored by specialist- no acute findings meriting change in the plan  6. Hypercholesterolemia  Continue lifestyle monitoring. Subjective/Objective:   Forrest Reyna was seen today for Follow-up    follow up active medical problems and medication management. 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     She was started on prazocin by her psychiatrist after mentioning that she had had

## 2023-05-10 ASSESSMENT — ENCOUNTER SYMPTOMS
ABDOMINAL PAIN: 0
SHORTNESS OF BREATH: 0

## 2023-05-10 NOTE — ASSESSMENT & PLAN NOTE
Continue with calcium, vitamin D supplementation. We will check vitamin D level in 6 months. Reviewed last lab work. Fosamax has been stopped as of 2021 after 4 years of therapy.

## 2023-05-11 RX ORDER — KETOCONAZOLE 20 MG/G
CREAM TOPICAL
Qty: 15 G | Refills: 0 | Status: SHIPPED | OUTPATIENT
Start: 2023-05-11

## 2023-05-25 RX ORDER — DOCUSATE SODIUM 100 MG/1
CAPSULE, LIQUID FILLED ORAL
Qty: 90 CAPSULE | Refills: 0 | Status: SHIPPED | OUTPATIENT
Start: 2023-05-25

## 2023-05-25 RX ORDER — ESOMEPRAZOLE MAGNESIUM 40 MG/1
CAPSULE, DELAYED RELEASE ORAL
Qty: 90 CAPSULE | Refills: 0 | Status: SHIPPED | OUTPATIENT
Start: 2023-05-25

## 2023-06-05 RX ORDER — KETOCONAZOLE 20 MG/G
CREAM TOPICAL
Qty: 15 G | Refills: 0 | Status: SHIPPED | OUTPATIENT
Start: 2023-06-05

## 2023-06-05 NOTE — TELEPHONE ENCOUNTER
Chief Complaint   Patient presents with    Medication Refill     Last Appointment with  5/9/23    Future Appointments   Date Time Provider Frank Padilla   8/1/2023 11:00 AM ZOEY Lema - NP [facility] BS AMB   11/9/2023  1:00 PM MD CARLOS MANUEL Humphrey BS AMB        [facility]

## 2023-06-09 RX ORDER — NYSTATIN 100000 U/G
CREAM TOPICAL
Qty: 30 G | Refills: 0 | OUTPATIENT
Start: 2023-06-09

## 2023-06-28 ENCOUNTER — CLINICAL DOCUMENTATION (OUTPATIENT)
Age: 73
End: 2023-06-28

## 2023-06-28 RX ORDER — NYSTATIN 100000 U/G
CREAM TOPICAL 2 TIMES DAILY
Qty: 30 G | Refills: 5 | Status: SHIPPED | OUTPATIENT
Start: 2023-06-28 | End: 2023-06-29 | Stop reason: SDUPTHER

## 2023-06-29 ENCOUNTER — TELEPHONE (OUTPATIENT)
Age: 73
End: 2023-06-29

## 2023-06-29 RX ORDER — NYSTATIN 100000 U/G
CREAM TOPICAL 2 TIMES DAILY PRN
Qty: 30 G | Refills: 5 | Status: SHIPPED | OUTPATIENT
Start: 2023-06-29

## 2023-07-11 RX ORDER — NYSTATIN 100000 [USP'U]/G
POWDER TOPICAL
Qty: 60 G | Refills: 5 | Status: SHIPPED | OUTPATIENT
Start: 2023-07-11

## 2023-07-17 RX ORDER — NYSTATIN 100000 [USP'U]/G
POWDER TOPICAL
Qty: 60 G | Refills: 0 | OUTPATIENT
Start: 2023-07-17

## 2023-07-24 ENCOUNTER — HOSPITAL ENCOUNTER (EMERGENCY)
Facility: HOSPITAL | Age: 73
Discharge: HOME | End: 2023-07-24
Attending: STUDENT IN AN ORGANIZED HEALTH CARE EDUCATION/TRAINING PROGRAM
Payer: MEDICARE

## 2023-07-24 ENCOUNTER — APPOINTMENT (OUTPATIENT)
Facility: HOSPITAL | Age: 73
End: 2023-07-24
Payer: MEDICARE

## 2023-07-24 VITALS
OXYGEN SATURATION: 99 % | SYSTOLIC BLOOD PRESSURE: 130 MMHG | DIASTOLIC BLOOD PRESSURE: 89 MMHG | TEMPERATURE: 98.9 F | HEART RATE: 91 BPM | RESPIRATION RATE: 20 BRPM

## 2023-07-24 DIAGNOSIS — E04.1 THYROID NODULE: ICD-10-CM

## 2023-07-24 DIAGNOSIS — S80.02XA CONTUSION OF LEFT KNEE, INITIAL ENCOUNTER: ICD-10-CM

## 2023-07-24 DIAGNOSIS — Z91.81 AT RISK FOR FALLING: ICD-10-CM

## 2023-07-24 DIAGNOSIS — S09.90XA INJURY OF HEAD, INITIAL ENCOUNTER: Primary | ICD-10-CM

## 2023-07-24 DIAGNOSIS — S01.511A LIP LACERATION, INITIAL ENCOUNTER: ICD-10-CM

## 2023-07-24 PROCEDURE — 99284 EMERGENCY DEPT VISIT MOD MDM: CPT

## 2023-07-24 PROCEDURE — 70450 CT HEAD/BRAIN W/O DYE: CPT

## 2023-07-24 PROCEDURE — 6370000000 HC RX 637 (ALT 250 FOR IP): Performed by: STUDENT IN AN ORGANIZED HEALTH CARE EDUCATION/TRAINING PROGRAM

## 2023-07-24 PROCEDURE — 12011 RPR F/E/E/N/L/M 2.5 CM/<: CPT

## 2023-07-24 PROCEDURE — 73562 X-RAY EXAM OF KNEE 3: CPT

## 2023-07-24 PROCEDURE — 72125 CT NECK SPINE W/O DYE: CPT

## 2023-07-24 RX ORDER — KETOCONAZOLE 20 MG/G
CREAM TOPICAL
Qty: 15 G | Refills: 0 | Status: SHIPPED | OUTPATIENT
Start: 2023-07-24

## 2023-07-24 RX ADMIN — Medication 3 ML: at 18:10

## 2023-07-24 ASSESSMENT — PAIN DESCRIPTION - LOCATION: LOCATION: MOUTH;KNEE

## 2023-07-24 ASSESSMENT — PAIN - FUNCTIONAL ASSESSMENT: PAIN_FUNCTIONAL_ASSESSMENT: 0-10

## 2023-07-24 ASSESSMENT — PAIN DESCRIPTION - ORIENTATION: ORIENTATION: LEFT

## 2023-07-24 ASSESSMENT — PAIN DESCRIPTION - DESCRIPTORS: DESCRIPTORS: ACHING

## 2023-07-24 ASSESSMENT — PAIN SCALES - GENERAL: PAINLEVEL_OUTOF10: 5

## 2023-07-24 NOTE — ED TRIAGE NOTES
Pt arrives via EMS from adult  for witnessed GLF after tripping over sandals. Pt arrives with laceration to lower lip.

## 2023-07-24 NOTE — DISCHARGE INSTRUCTIONS
CT scan showed a thyroid nodule which patient needs to follow-up with her primary care provider regarding for further evaluation.

## 2023-07-24 NOTE — ED PROVIDER NOTES
[facility] EMERGENCY DEP  EMERGENCY DEPARTMENT ENCOUNTER      Pt Name: Lesley Bowie  MRN: [id number]  [address] [facility] 1950  Date of evaluation: 7/24/2023  Provider: Luigi Olivares       Chief Complaint   Patient presents with    Laceration    Knee Pain         HISTORY OF PRESENT ILLNESS    HPI    Lesley Bowie is a 67 y.o. female with a history of dementia and other medical problems listed below who presents to the emergency department for evaluation of a fall. Patient was at her day program this afternoon when she tripped over her sandals and fell face forward. Per staff on scene, no LOC. Patient has been at her baseline mental status since the fall. She does have a laceration to her lip. She denies any pain but history is limited due to her baseline mental status    Nursing Notes were reviewed.     REVIEW OF SYSTEMS       Review of Systems   Unable to perform ROS: Dementia         PAST MEDICAL HISTORY     Past Medical History:   Diagnosis Date    Allergic rhinitis, cause unspecified 7/15/2010    Anxiety     Detached retina     Developmental delay 11/23/2015    Diverticulosis     Fracture     left wrist fx & surgery; pt fell     GERD (gastroesophageal reflux disease)     HTN (hypertension)     Memory disorder     Mental retardation     Osteoporosis 8/5/2016    Other and unspecified hyperlipidemia     S/P colonoscopy 03/01/08    Snoring     Thrombocytopenia (HCC)          SURGICAL HISTORY       Past Surgical History:   Procedure Laterality Date    CATARACT REMOVAL      COLONOSCOPY  3-5-2008    OVARIAN CYST REMOVAL      POLYPECTOMY      ablation         CURRENT MEDICATIONS       Previous Medications    BUSPIRONE (BUSPAR) 30 MG TABLET    Take by mouth 2 times daily    CALCIUM ANTACID 500 MG CHEWABLE TABLET    Take 1 tablet by mouth 2 times daily    CALCIUM CARBONATE (OS-EMELY) 1250 (500 CA) MG CHEWABLE TABLET    Take by mouth 2 times daily    CYANOCOBALAMIN 500 MCG TABLET

## 2023-07-26 ENCOUNTER — OFFICE VISIT (OUTPATIENT)
Age: 73
End: 2023-07-26
Payer: MEDICARE

## 2023-07-26 VITALS
SYSTOLIC BLOOD PRESSURE: 90 MMHG | OXYGEN SATURATION: 96 % | DIASTOLIC BLOOD PRESSURE: 60 MMHG | RESPIRATION RATE: 16 BRPM | HEART RATE: 68 BPM | BODY MASS INDEX: 34.84 KG/M2 | TEMPERATURE: 97.5 F | HEIGHT: 59 IN | WEIGHT: 172.8 LBS

## 2023-07-26 DIAGNOSIS — D68.9 COAGULATION DEFECT (HCC): ICD-10-CM

## 2023-07-26 DIAGNOSIS — E04.1 THYROID NODULE: Primary | ICD-10-CM

## 2023-07-26 PROCEDURE — G8417 CALC BMI ABV UP PARAM F/U: HCPCS | Performed by: INTERNAL MEDICINE

## 2023-07-26 PROCEDURE — 1090F PRES/ABSN URINE INCON ASSESS: CPT | Performed by: INTERNAL MEDICINE

## 2023-07-26 PROCEDURE — 1036F TOBACCO NON-USER: CPT | Performed by: INTERNAL MEDICINE

## 2023-07-26 PROCEDURE — 99214 OFFICE O/P EST MOD 30 MIN: CPT | Performed by: INTERNAL MEDICINE

## 2023-07-26 PROCEDURE — G8428 CUR MEDS NOT DOCUMENT: HCPCS | Performed by: INTERNAL MEDICINE

## 2023-07-26 PROCEDURE — 1123F ACP DISCUSS/DSCN MKR DOCD: CPT | Performed by: INTERNAL MEDICINE

## 2023-07-26 PROCEDURE — G8399 PT W/DXA RESULTS DOCUMENT: HCPCS | Performed by: INTERNAL MEDICINE

## 2023-07-26 PROCEDURE — 3017F COLORECTAL CA SCREEN DOC REV: CPT | Performed by: INTERNAL MEDICINE

## 2023-07-27 LAB
T4 FREE SERPL-MCNC: 0.9 NG/DL (ref 0.8–1.5)
TSH SERPL DL<=0.05 MIU/L-ACNC: 1.85 UIU/ML (ref 0.36–3.74)

## 2023-08-01 ENCOUNTER — OFFICE VISIT (OUTPATIENT)
Age: 73
End: 2023-08-01
Payer: MEDICARE

## 2023-08-01 VITALS
WEIGHT: 172 LBS | OXYGEN SATURATION: 98 % | HEART RATE: 70 BPM | SYSTOLIC BLOOD PRESSURE: 123 MMHG | HEIGHT: 59 IN | BODY MASS INDEX: 34.68 KG/M2 | RESPIRATION RATE: 18 BRPM | DIASTOLIC BLOOD PRESSURE: 73 MMHG

## 2023-08-01 DIAGNOSIS — F03.C0 SEVERE DEMENTIA WITHOUT BEHAVIORAL DISTURBANCE, PSYCHOTIC DISTURBANCE, MOOD DISTURBANCE, OR ANXIETY, UNSPECIFIED DEMENTIA TYPE (HCC): Primary | ICD-10-CM

## 2023-08-01 PROCEDURE — 99215 OFFICE O/P EST HI 40 MIN: CPT

## 2023-08-01 PROCEDURE — G8417 CALC BMI ABV UP PARAM F/U: HCPCS

## 2023-08-01 PROCEDURE — G8399 PT W/DXA RESULTS DOCUMENT: HCPCS

## 2023-08-01 PROCEDURE — 1036F TOBACCO NON-USER: CPT

## 2023-08-01 PROCEDURE — G8427 DOCREV CUR MEDS BY ELIG CLIN: HCPCS

## 2023-08-01 PROCEDURE — 3017F COLORECTAL CA SCREEN DOC REV: CPT

## 2023-08-01 PROCEDURE — 1123F ACP DISCUSS/DSCN MKR DOCD: CPT

## 2023-08-01 PROCEDURE — 1090F PRES/ABSN URINE INCON ASSESS: CPT

## 2023-08-01 RX ORDER — GALANTAMINE HYDROBROMIDE 4 MG/1
TABLET, FILM COATED ORAL
Qty: 180 TABLET | Refills: 1 | Status: SHIPPED | OUTPATIENT
Start: 2023-08-01

## 2023-08-01 ASSESSMENT — PATIENT HEALTH QUESTIONNAIRE - PHQ9
SUM OF ALL RESPONSES TO PHQ QUESTIONS 1-9: 0
1. LITTLE INTEREST OR PLEASURE IN DOING THINGS: 0
SUM OF ALL RESPONSES TO PHQ QUESTIONS 1-9: 0
2. FEELING DOWN, DEPRESSED OR HOPELESS: 0
SUM OF ALL RESPONSES TO PHQ9 QUESTIONS 1 & 2: 0
SUM OF ALL RESPONSES TO PHQ QUESTIONS 1-9: 0
SUM OF ALL RESPONSES TO PHQ QUESTIONS 1-9: 0

## 2023-08-01 NOTE — PROGRESS NOTES
Chief Complaint   Patient presents with    Follow-up     dementia       HPI    Yony Sheriff is a 67year old female here for follow up. I saw her last on 1/31/23 for dementia. She did not tolerate donepezil or memantine. She is tolerating Galantamine 4 mg BID well. She has 24 hour care at home. She is here today with her caregiver. She is reporting that everything is fairly stable. She is doing about the same. She has had two recent falls at her day program. Last one she hit her head and had to get stiches. Head CT was normal. She is still requiring 24 hour care and help with all ADL's. She sleeps good with no hallucinations. Caregiver does report that its hard to get her to drink a lot of water during the day. She also tells me that she is up during the night a lot using the bathroom. She goes to her day center daily for some socialization. Mood is good without any aggression. Tolerating her galantamine without side effects. Looks like she is now on Payton Mariel. Unsure of who prescribed this for her. Randee Cash is a 67 year old female here for a follow up. She is a new patient for me. She was last seen in the office on 2/8/22 by Dr Uriel Diaz for Dementia. She has been pretty stable the past 2 years. She did not tolerate donepezil or memantine. She is tolerating Galantamine 4 mg BID well. She is here today with her caregiver. She is reporting some more confusion. Her behavior and mood are good. No aggression. She is having some incontinence of bowel and bladder. Eating and sleeping good without wondering. Patient is saving she sometimes with have some visual hallucinations but nothing bothersome. No side effects from her medication. She has 24- hour care and needs complete help with ADL's. Denies falls. Review of Systems   Musculoskeletal:  Negative for gait problem. Psychiatric/Behavioral:  Positive for confusion. Negative for agitation, behavioral problems, hallucinations and sleep disturbance.

## 2023-08-01 NOTE — PROGRESS NOTES
Chief Complaint   Patient presents with    Follow-up     dementia      Vitals:    08/01/23 1102   BP: 123/73   Pulse: 70   Resp: 18   SpO2: 98%

## 2023-08-04 ENCOUNTER — HOSPITAL ENCOUNTER (OUTPATIENT)
Facility: HOSPITAL | Age: 73
Discharge: HOME | End: 2023-08-04
Attending: INTERNAL MEDICINE
Payer: MEDICARE

## 2023-08-04 DIAGNOSIS — E04.1 THYROID NODULE: ICD-10-CM

## 2023-08-04 PROCEDURE — 76536 US EXAM OF HEAD AND NECK: CPT

## 2023-08-04 NOTE — PROGRESS NOTES
Follow Up Visit    Miladis Walter is a 67 y.o. female. she presents for Fall (Fall July 24, 2023. Lip busted (bottom) 3 dissolvable sutures. Let leg swollen. Rash on right leg ( skin breakdown)/Went to [facility] ER 07-)    The patient presents after having had a visit to the ED after a fall. She injured her lip at that time. 3 sutures were placed. These are dissolvable. Additionally, the patient injured her left leg. There was some bruising there however, the leg is appearing to heal at this time. The patient presents today with a representative from her assisted living facility. The patient denies pain today. She does complain of an area on her neck. There is a swelling present. This was noted in the ER during her head CT. There was a 2 x 3 cm swelling on the thyroid isthmus. There was concern of this nodule and an ultrasound was recommended. She presents today additionally to have this test ordered. 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  Authorizing Provider   ketoconazole (NIZORAL) 2 % cream APPLY TOPICALLY TO AFFECTED AREA(S) DAILY FOR SKIN CONDITION 7/24/23  Yes ZOEY Lester NP   nystatin (MYCOSTATIN) 985862 UNIT/GM powder APPLY POWDER TOPICALLY TO AFFECTED AREA(S) 4 TIMES A DAY FOR FUNGAL 7/11/23  Yes ZOEY Lester NP   nystatin (MYCOSTATIN) 943458 UNIT/GM cream Apply topically 2 times daily as needed (Rash) 6/29/23  Yes Shady Humphries MD   esomeprazole (NEXIUM) 40 MG delayed release capsule TAKE 1

## 2023-08-28 DIAGNOSIS — B37.2 YEAST INFECTION OF THE SKIN: Primary | ICD-10-CM

## 2023-08-28 RX ORDER — KETOCONAZOLE 20 MG/G
CREAM TOPICAL
Qty: 15 G | Refills: 0 | Status: SHIPPED | OUTPATIENT
Start: 2023-08-28

## 2023-10-26 ENCOUNTER — TELEPHONE (OUTPATIENT)
Age: 73
End: 2023-10-26

## 2023-10-26 DIAGNOSIS — B37.2 YEAST INFECTION OF THE SKIN: ICD-10-CM

## 2023-10-26 DIAGNOSIS — E04.1 THYROID NODULE: Primary | ICD-10-CM

## 2023-10-26 NOTE — TELEPHONE ENCOUNTER
It does not look like her thyroid fine-needle aspiration biopsy has been scheduled yet. Please assist with making sure this gets scheduled.

## 2023-10-27 PROBLEM — E04.1 THYROID NODULE: Status: ACTIVE | Noted: 2023-10-27

## 2023-10-27 RX ORDER — KETOCONAZOLE 20 MG/G
CREAM TOPICAL
Qty: 15 G | Refills: 0 | Status: SHIPPED | OUTPATIENT
Start: 2023-10-27

## 2023-10-27 RX ORDER — MENTHOL 0 G/G
POWDER TOPICAL
Qty: 283 G | Refills: 0 | Status: SHIPPED | OUTPATIENT
Start: 2023-10-27

## 2023-11-06 ENCOUNTER — OFFICE VISIT (OUTPATIENT)
Age: 73
End: 2023-11-06
Payer: MEDICARE

## 2023-11-06 VITALS
WEIGHT: 172 LBS | BODY MASS INDEX: 34.68 KG/M2 | HEIGHT: 59 IN | HEART RATE: 87 BPM | OXYGEN SATURATION: 97 % | RESPIRATION RATE: 18 BRPM | SYSTOLIC BLOOD PRESSURE: 111 MMHG | DIASTOLIC BLOOD PRESSURE: 69 MMHG

## 2023-11-06 DIAGNOSIS — R41.89 BEHAVIOR RELATED TO COGNITIVE IMPAIRMENT: ICD-10-CM

## 2023-11-06 DIAGNOSIS — G30.0 MODERATE EARLY ONSET ALZHEIMER'S DEMENTIA WITH AGITATION (HCC): Primary | ICD-10-CM

## 2023-11-06 DIAGNOSIS — F02.B11 MODERATE EARLY ONSET ALZHEIMER'S DEMENTIA WITH AGITATION (HCC): Primary | ICD-10-CM

## 2023-11-06 PROCEDURE — G8428 CUR MEDS NOT DOCUMENT: HCPCS

## 2023-11-06 PROCEDURE — 1123F ACP DISCUSS/DSCN MKR DOCD: CPT

## 2023-11-06 PROCEDURE — 99215 OFFICE O/P EST HI 40 MIN: CPT

## 2023-11-06 PROCEDURE — 1036F TOBACCO NON-USER: CPT

## 2023-11-06 PROCEDURE — 1090F PRES/ABSN URINE INCON ASSESS: CPT

## 2023-11-06 PROCEDURE — 3017F COLORECTAL CA SCREEN DOC REV: CPT

## 2023-11-06 PROCEDURE — G8484 FLU IMMUNIZE NO ADMIN: HCPCS

## 2023-11-06 PROCEDURE — G8417 CALC BMI ABV UP PARAM F/U: HCPCS

## 2023-11-06 PROCEDURE — G8399 PT W/DXA RESULTS DOCUMENT: HCPCS

## 2023-11-06 ASSESSMENT — PATIENT HEALTH QUESTIONNAIRE - PHQ9
2. FEELING DOWN, DEPRESSED OR HOPELESS: 0
1. LITTLE INTEREST OR PLEASURE IN DOING THINGS: 0
SUM OF ALL RESPONSES TO PHQ QUESTIONS 1-9: 0
SUM OF ALL RESPONSES TO PHQ9 QUESTIONS 1 & 2: 0
SUM OF ALL RESPONSES TO PHQ QUESTIONS 1-9: 0

## 2023-11-06 NOTE — PROGRESS NOTES
Chief Complaint   Patient presents with    Follow-up     Dementia. Group home reports multiple falls as well as behavior issues       HPI    Willie is a 75year old female here for follow up. I saw her last on 8/1/23 for dementia. She did not tolerate donepezil or memantine. She is tolerating Galantamine 4 mg BID well. She has 24 hour care at home and goes to a group center. She is here today with her caregiver. Memory is about the same. There are days that are worse than others. These bad days are becoming more frequent. On the bad days, she will refuse to get out of bed, take her medication, eat, participate in any activity or get dressed. She is never phycially aggressive. The bad days can happen 2-3x weekly now. She recently saw her psychiatrist last week and they made some changes to her medications, which included increasing her Prozac and making her Seroquel 50 mg TID. Her POA is her brother and sister in law. They see her once a month. No falls since this past July. They are moving her bedroom downstairs to help with safety concerns. Tolerating her Galanthamine without side effects. Sleeping well. Review of Systems   Musculoskeletal:  Positive for gait problem. Neurological:  Positive for weakness. Psychiatric/Behavioral:  Positive for behavioral problems and confusion. Negative for sleep disturbance.           Past Medical History:   Diagnosis Date    Allergic rhinitis, cause unspecified 7/15/2010    Anxiety     Detached retina     Developmental delay 11/23/2015    Diverticulosis     Fracture     left wrist fx & surgery; pt fell     GERD (gastroesophageal reflux disease)     HTN (hypertension)     Memory disorder     Mental retardation     Osteoporosis 8/5/2016    Other and unspecified hyperlipidemia     S/P colonoscopy 03/01/08    Snoring     Thrombocytopenia ([address])      Family History   Problem Relation Age of Onset    Heart Disease Father     Diabetes Father      Social History

## 2023-11-06 NOTE — PROGRESS NOTES
Chief Complaint   Patient presents with    Follow-up     Dementia.   Group home reports multiple falls as well as behavior issues      Vitals:    11/06/23 0912   BP: 111/69   Pulse: 87   Resp: 18   SpO2: 97%

## 2023-11-07 ENCOUNTER — HOSPITAL ENCOUNTER (OUTPATIENT)
Facility: HOSPITAL | Age: 73
Discharge: HOME | End: 2023-11-10
Attending: INTERNAL MEDICINE
Payer: MEDICARE

## 2023-11-07 DIAGNOSIS — E04.1 THYROID NODULE: ICD-10-CM

## 2023-11-07 PROCEDURE — 88172 CYTP DX EVAL FNA 1ST EA SITE: CPT

## 2023-11-07 PROCEDURE — 88173 CYTOPATH EVAL FNA REPORT: CPT

## 2023-11-07 PROCEDURE — 10005 FNA BX W/US GDN 1ST LES: CPT

## 2023-11-28 ENCOUNTER — TELEPHONE (OUTPATIENT)
Age: 73
End: 2023-11-28

## 2023-11-28 ENCOUNTER — OFFICE VISIT (OUTPATIENT)
Age: 73
End: 2023-11-28
Payer: MEDICARE

## 2023-11-28 VITALS
BODY MASS INDEX: 34.11 KG/M2 | HEART RATE: 68 BPM | HEIGHT: 59 IN | DIASTOLIC BLOOD PRESSURE: 73 MMHG | WEIGHT: 169.2 LBS | SYSTOLIC BLOOD PRESSURE: 110 MMHG | RESPIRATION RATE: 16 BRPM | TEMPERATURE: 98.2 F | OXYGEN SATURATION: 97 %

## 2023-11-28 DIAGNOSIS — E55.9 VITAMIN D DEFICIENCY: ICD-10-CM

## 2023-11-28 DIAGNOSIS — R73.09 ELEVATED GLUCOSE: ICD-10-CM

## 2023-11-28 DIAGNOSIS — E78.00 HYPERCHOLESTEROLEMIA: ICD-10-CM

## 2023-11-28 DIAGNOSIS — K21.9 GASTROESOPHAGEAL REFLUX DISEASE WITHOUT ESOPHAGITIS: ICD-10-CM

## 2023-11-28 DIAGNOSIS — Z78.0 POSTMENOPAUSAL: ICD-10-CM

## 2023-11-28 DIAGNOSIS — E04.1 THYROID NODULE: Primary | ICD-10-CM

## 2023-11-28 DIAGNOSIS — Z12.31 VISIT FOR SCREENING MAMMOGRAM: ICD-10-CM

## 2023-11-28 PROCEDURE — G8399 PT W/DXA RESULTS DOCUMENT: HCPCS | Performed by: INTERNAL MEDICINE

## 2023-11-28 PROCEDURE — 1036F TOBACCO NON-USER: CPT | Performed by: INTERNAL MEDICINE

## 2023-11-28 PROCEDURE — 1090F PRES/ABSN URINE INCON ASSESS: CPT | Performed by: INTERNAL MEDICINE

## 2023-11-28 PROCEDURE — 1123F ACP DISCUSS/DSCN MKR DOCD: CPT | Performed by: INTERNAL MEDICINE

## 2023-11-28 PROCEDURE — 3017F COLORECTAL CA SCREEN DOC REV: CPT | Performed by: INTERNAL MEDICINE

## 2023-11-28 PROCEDURE — G8427 DOCREV CUR MEDS BY ELIG CLIN: HCPCS | Performed by: INTERNAL MEDICINE

## 2023-11-28 PROCEDURE — 99214 OFFICE O/P EST MOD 30 MIN: CPT | Performed by: INTERNAL MEDICINE

## 2023-11-28 PROCEDURE — G8484 FLU IMMUNIZE NO ADMIN: HCPCS | Performed by: INTERNAL MEDICINE

## 2023-11-28 PROCEDURE — G8417 CALC BMI ABV UP PARAM F/U: HCPCS | Performed by: INTERNAL MEDICINE

## 2023-11-28 RX ORDER — ESOMEPRAZOLE MAGNESIUM 40 MG/1
CAPSULE, DELAYED RELEASE ORAL
Qty: 30 CAPSULE | Refills: 11 | Status: SHIPPED | OUTPATIENT
Start: 2023-11-28

## 2023-11-28 RX ORDER — DOCUSATE SODIUM 100 MG/1
CAPSULE, LIQUID FILLED ORAL
Qty: 30 CAPSULE | Refills: 11 | Status: SHIPPED | OUTPATIENT
Start: 2023-11-28

## 2023-11-28 ASSESSMENT — PATIENT HEALTH QUESTIONNAIRE - PHQ9
SUM OF ALL RESPONSES TO PHQ QUESTIONS 1-9: 0
SUM OF ALL RESPONSES TO PHQ QUESTIONS 1-9: 0
2. FEELING DOWN, DEPRESSED OR HOPELESS: 0
SUM OF ALL RESPONSES TO PHQ QUESTIONS 1-9: 0
SUM OF ALL RESPONSES TO PHQ9 QUESTIONS 1 & 2: 0
SUM OF ALL RESPONSES TO PHQ QUESTIONS 1-9: 0
1. LITTLE INTEREST OR PLEASURE IN DOING THINGS: 0

## 2023-11-28 ASSESSMENT — ENCOUNTER SYMPTOMS
ABDOMINAL PAIN: 0
SHORTNESS OF BREATH: 0

## 2023-11-28 NOTE — PATIENT INSTRUCTIONS
[employer] disclaims any warranty or liability for your use of this information.

## 2023-11-28 NOTE — TELEPHONE ENCOUNTER
Reason for call:  TC from [facility] w/[facility]. [facility] on pt's PHI. Pt id verified. [facility] states there was a change with dosage of pt's QUEtiapine (SEROQUEL) 50 MG tablet. [facility] inquiring if new script has been sent in to pharmacy re change. [facility] would like to speak with nurse re med dosage change.      Is this a new problem: No    Date of last appointment:  11/28/2023     Can we respond via Zingayat: No    Best call back number: Migd[place]/[facility]/[phone]

## 2023-11-28 NOTE — PROGRESS NOTES
Sabine Rodríguez is a 75 y.o. female who was seen in clinic today (11/28/2023). Assessment & Plan:   Below is the assessment and plan developed based on review of pertinent history, physical exam, labs, studies, and medications. 1. Thyroid nodule  Assessment & Plan:   Here today to discuss thyroid bx results and reviewed benign findings on pathology report. 2. Hypercholesterolemia  Assessment & Plan:   Unclear control, check lab work. continue low fat low chol diet. Orders:  -     Lipid Panel; Future  -     Comprehensive Metabolic Panel; Future  -     CBC with Auto Differential; Future  3. Gastroesophageal reflux disease without esophagitis  Assessment & Plan:   Well-controlled, continue current medications  4. Vitamin D deficiency  -     Vitamin D 25 Hydroxy; Future  5. Elevated glucose  -     Hemoglobin A1C; Future  6. Visit for screening mammogram  -     JEF JENARO DIGITAL SCREEN BILATERAL; Future  7. Postmenopausal  -     DEXA BONE DENSITY AXIAL SKELETON; Future     No follow-ups on file. Subjective/Objective:   Linnette Meza was seen today for Discuss Labs   follow up active medical problems and medication management. 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    Exercise: walking   Diet low suga/fat     Also has complaint of none   Since last visit: fna thyroid nodule path benign    Current Outpatient Medications   Medication Sig Dispense Refill    docusate sodium (COLACE) 100 MG capsule TAKE 1 CAPSULE BY MOUTH ONCE A DAY FOR CONSTIPATION 30 capsule 11    esomeprazole (NEXIUM) 40

## 2023-11-29 ENCOUNTER — TELEPHONE (OUTPATIENT)
Age: 73
End: 2023-11-29

## 2023-11-29 NOTE — TELEPHONE ENCOUNTER
Notified [address] to contact pt's neurologist office regarding Seroquel dosage, as PCP does not manage medication/refills.

## 2023-11-29 NOTE — TELEPHONE ENCOUNTER
Emigdio Winn from [employer] called, returning Inocencia's phone call, in regards to the change in patient's dosage for:  QUEtiapine (SEROQUEL) 50 MG tablet.      Emigdio Winn - [phone]

## 2023-12-06 ENCOUNTER — NURSE ONLY (OUTPATIENT)
Age: 73
End: 2023-12-06

## 2023-12-06 VITALS
BODY MASS INDEX: 34.07 KG/M2 | OXYGEN SATURATION: 95 % | HEIGHT: 59 IN | TEMPERATURE: 97.7 F | SYSTOLIC BLOOD PRESSURE: 102 MMHG | HEART RATE: 65 BPM | DIASTOLIC BLOOD PRESSURE: 65 MMHG | RESPIRATION RATE: 16 BRPM | WEIGHT: 169 LBS

## 2023-12-06 DIAGNOSIS — R73.09 ELEVATED GLUCOSE: ICD-10-CM

## 2023-12-06 DIAGNOSIS — E55.9 VITAMIN D DEFICIENCY: ICD-10-CM

## 2023-12-06 DIAGNOSIS — E78.00 HYPERCHOLESTEROLEMIA: ICD-10-CM

## 2023-12-06 NOTE — PROGRESS NOTES
Pt walked into office with  asking to be seen d/t a fall this am after tripping over the floor threshold in her home. No LOC, pt did not hit her head, no visual injury at time of fall per SW.  Pt stated \"my knees hurt a little bit. \" Small red area noted on left knee, no swelling, bruising, or abrasions on knees bilaterally. Pt denies pain or discomfort anywhere else at this time. After consulting with on call provider as PCP is out of the office this week, advised pt and SW to go to urgent care facility to be further evaluated if pain increased or persisted. Pt and SW verbalized understanding.

## 2023-12-07 LAB
25(OH)D3 SERPL-MCNC: 48.2 NG/ML (ref 30–100)
ALBUMIN SERPL-MCNC: 3.5 G/DL (ref 3.5–5)
ALBUMIN/GLOB SERPL: 0.9 (ref 1.1–2.2)
ALP SERPL-CCNC: 105 U/L (ref 45–117)
ALT SERPL-CCNC: 33 U/L (ref 12–78)
ANION GAP SERPL CALC-SCNC: 5 MMOL/L (ref 5–15)
AST SERPL-CCNC: 24 U/L (ref 15–37)
BASOPHILS # BLD: 0.1 K/UL (ref 0–0.1)
BASOPHILS NFR BLD: 1 % (ref 0–1)
BILIRUB SERPL-MCNC: 0.4 MG/DL (ref 0.2–1)
BUN SERPL-MCNC: 21 MG/DL (ref 6–20)
BUN/CREAT SERPL: 22 (ref 12–20)
CALCIUM SERPL-MCNC: 8.4 MG/DL (ref 8.5–10.1)
CHLORIDE SERPL-SCNC: 108 MMOL/L (ref 97–108)
CHOLEST SERPL-MCNC: 238 MG/DL
CO2 SERPL-SCNC: 27 MMOL/L (ref 21–32)
CREAT SERPL-MCNC: 0.95 MG/DL (ref 0.55–1.02)
DIFFERENTIAL METHOD BLD: ABNORMAL
EOSINOPHIL # BLD: 0.1 K/UL (ref 0–0.4)
EOSINOPHIL NFR BLD: 2 % (ref 0–7)
ERYTHROCYTE [DISTWIDTH] IN BLOOD BY AUTOMATED COUNT: 14.1 % (ref 11.5–14.5)
EST. AVERAGE GLUCOSE BLD GHB EST-MCNC: 117 MG/DL
GLOBULIN SER CALC-MCNC: 3.7 G/DL (ref 2–4)
GLUCOSE SERPL-MCNC: 92 MG/DL (ref 65–100)
HBA1C MFR BLD: 5.7 % (ref 4–5.6)
HCT VFR BLD AUTO: 42 % (ref 35–47)
HDLC SERPL-MCNC: 55 MG/DL
HDLC SERPL: 4.3 (ref 0–5)
HGB BLD-MCNC: 13.5 G/DL (ref 11.5–16)
IMM GRANULOCYTES # BLD AUTO: 0 K/UL (ref 0–0.04)
IMM GRANULOCYTES NFR BLD AUTO: 0 % (ref 0–0.5)
LDLC SERPL CALC-MCNC: 162.6 MG/DL (ref 0–100)
LYMPHOCYTES # BLD: 1.4 K/UL (ref 0.8–3.5)
LYMPHOCYTES NFR BLD: 27 % (ref 12–49)
MCH RBC QN AUTO: 29.5 PG (ref 26–34)
MCHC RBC AUTO-ENTMCNC: 32.1 G/DL (ref 30–36.5)
MCV RBC AUTO: 91.7 FL (ref 80–99)
MONOCYTES # BLD: 0.4 K/UL (ref 0–1)
MONOCYTES NFR BLD: 8 % (ref 5–13)
NEUTS SEG # BLD: 3 K/UL (ref 1.8–8)
NEUTS SEG NFR BLD: 62 % (ref 32–75)
NRBC # BLD: 0 K/UL (ref 0–0.01)
NRBC BLD-RTO: 0 PER 100 WBC
PLATELET # BLD AUTO: 94 K/UL (ref 150–400)
PMV BLD AUTO: 12.8 FL (ref 8.9–12.9)
POTASSIUM SERPL-SCNC: 4.3 MMOL/L (ref 3.5–5.1)
PROT SERPL-MCNC: 7.2 G/DL (ref 6.4–8.2)
RBC # BLD AUTO: 4.58 M/UL (ref 3.8–5.2)
RBC MORPH BLD: ABNORMAL
SODIUM SERPL-SCNC: 140 MMOL/L (ref 136–145)
TRIGL SERPL-MCNC: 102 MG/DL
VLDLC SERPL CALC-MCNC: 20.4 MG/DL
WBC # BLD AUTO: 5 K/UL (ref 3.6–11)

## 2023-12-16 DIAGNOSIS — D69.6 THROMBOCYTOPENIA (HCC): Primary | ICD-10-CM

## 2023-12-18 ENCOUNTER — TELEPHONE (OUTPATIENT)
Age: 73
End: 2023-12-18

## 2023-12-18 NOTE — TELEPHONE ENCOUNTER
Tina returned Barb's call again -- says she was on the phone with her boss and could not answer.    [phone]

## 2024-01-23 ENCOUNTER — HOSPITAL ENCOUNTER (OUTPATIENT)
Facility: HOSPITAL | Age: 74
Discharge: HOME | End: 2024-01-26
Attending: INTERNAL MEDICINE
Payer: MEDICARE

## 2024-01-23 VITALS — WEIGHT: 169 LBS | HEIGHT: 59 IN | BODY MASS INDEX: 34.07 KG/M2

## 2024-01-23 VITALS — BODY MASS INDEX: 32.39 KG/M2 | WEIGHT: 165 LBS | HEIGHT: 60 IN

## 2024-01-23 DIAGNOSIS — Z78.0 POSTMENOPAUSAL: ICD-10-CM

## 2024-01-23 DIAGNOSIS — Z12.31 VISIT FOR SCREENING MAMMOGRAM: ICD-10-CM

## 2024-01-23 PROCEDURE — 77067 SCR MAMMO BI INCL CAD: CPT

## 2024-01-23 PROCEDURE — 77080 DXA BONE DENSITY AXIAL: CPT

## 2024-01-24 RX ORDER — CALCIUM CARBONATE 500 MG/1
TABLET, CHEWABLE ORAL
Qty: 60 TABLET | Refills: 11 | Status: SHIPPED | OUTPATIENT
Start: 2024-01-24

## 2024-02-19 RX ORDER — MENTHOL 0 G/G
POWDER TOPICAL
Qty: 283 G | Refills: 0 | Status: SHIPPED | OUTPATIENT
Start: 2024-02-19

## 2024-02-20 DIAGNOSIS — B37.2 YEAST INFECTION OF THE SKIN: ICD-10-CM

## 2024-02-20 RX ORDER — KETOCONAZOLE 20 MG/G
CREAM TOPICAL
Qty: 15 G | Refills: 0 | Status: SHIPPED | OUTPATIENT
Start: 2024-02-20

## 2024-02-28 DIAGNOSIS — F03.C0 SEVERE DEMENTIA WITHOUT BEHAVIORAL DISTURBANCE, PSYCHOTIC DISTURBANCE, MOOD DISTURBANCE, OR ANXIETY, UNSPECIFIED DEMENTIA TYPE (HCC): ICD-10-CM

## 2024-02-28 DIAGNOSIS — B37.2 YEAST INFECTION OF THE SKIN: ICD-10-CM

## 2024-02-28 RX ORDER — KETOCONAZOLE 20 MG/G
CREAM TOPICAL
Qty: 15 G | Refills: 0 | OUTPATIENT
Start: 2024-02-28

## 2024-02-28 RX ORDER — NYSTATIN 100000 [USP'U]/G
POWDER TOPICAL
Qty: 60 G | Refills: 3 | Status: SHIPPED | OUTPATIENT
Start: 2024-02-28

## 2024-02-29 RX ORDER — GALANTAMINE HYDROBROMIDE 4 MG/1
TABLET, FILM COATED ORAL
Qty: 60 TABLET | Refills: 0 | OUTPATIENT
Start: 2024-02-29

## 2024-05-28 ENCOUNTER — OFFICE VISIT (OUTPATIENT)
Age: 74
End: 2024-05-28
Payer: MEDICARE

## 2024-05-28 VITALS
SYSTOLIC BLOOD PRESSURE: 107 MMHG | DIASTOLIC BLOOD PRESSURE: 73 MMHG | HEART RATE: 65 BPM | WEIGHT: 165.8 LBS | RESPIRATION RATE: 16 BRPM | HEIGHT: 59 IN | BODY MASS INDEX: 33.43 KG/M2 | TEMPERATURE: 98 F | OXYGEN SATURATION: 96 %

## 2024-05-28 DIAGNOSIS — F03.918: ICD-10-CM

## 2024-05-28 DIAGNOSIS — R73.09 ELEVATED GLUCOSE: ICD-10-CM

## 2024-05-28 DIAGNOSIS — B37.2 YEAST INFECTION OF THE SKIN: ICD-10-CM

## 2024-05-28 DIAGNOSIS — M81.0 AGE-RELATED OSTEOPOROSIS WITHOUT CURRENT PATHOLOGICAL FRACTURE: ICD-10-CM

## 2024-05-28 DIAGNOSIS — K21.9 GASTROESOPHAGEAL REFLUX DISEASE WITHOUT ESOPHAGITIS: ICD-10-CM

## 2024-05-28 DIAGNOSIS — E78.00 HYPERCHOLESTEROLEMIA: ICD-10-CM

## 2024-05-28 DIAGNOSIS — F79 INTELLECTUAL DISABILITY: ICD-10-CM

## 2024-05-28 DIAGNOSIS — Z00.00 MEDICARE ANNUAL WELLNESS VISIT, SUBSEQUENT: Primary | ICD-10-CM

## 2024-05-28 DIAGNOSIS — F03.C0 SEVERE DEMENTIA WITHOUT BEHAVIORAL DISTURBANCE, PSYCHOTIC DISTURBANCE, MOOD DISTURBANCE, OR ANXIETY, UNSPECIFIED DEMENTIA TYPE (HCC): ICD-10-CM

## 2024-05-28 DIAGNOSIS — F33.42 RECURRENT MAJOR DEPRESSIVE DISORDER, IN FULL REMISSION (HCC): ICD-10-CM

## 2024-05-28 PROBLEM — D68.9 COAGULATION DEFECT (HCC): Status: RESOLVED | Noted: 2023-07-26 | Resolved: 2024-05-28

## 2024-05-28 PROCEDURE — 1090F PRES/ABSN URINE INCON ASSESS: CPT | Performed by: INTERNAL MEDICINE

## 2024-05-28 PROCEDURE — G0439 PPPS, SUBSEQ VISIT: HCPCS | Performed by: INTERNAL MEDICINE

## 2024-05-28 PROCEDURE — G8417 CALC BMI ABV UP PARAM F/U: HCPCS | Performed by: INTERNAL MEDICINE

## 2024-05-28 PROCEDURE — G8427 DOCREV CUR MEDS BY ELIG CLIN: HCPCS | Performed by: INTERNAL MEDICINE

## 2024-05-28 PROCEDURE — G8399 PT W/DXA RESULTS DOCUMENT: HCPCS | Performed by: INTERNAL MEDICINE

## 2024-05-28 PROCEDURE — 3017F COLORECTAL CA SCREEN DOC REV: CPT | Performed by: INTERNAL MEDICINE

## 2024-05-28 PROCEDURE — 1036F TOBACCO NON-USER: CPT | Performed by: INTERNAL MEDICINE

## 2024-05-28 PROCEDURE — 99214 OFFICE O/P EST MOD 30 MIN: CPT | Performed by: INTERNAL MEDICINE

## 2024-05-28 PROCEDURE — 1123F ACP DISCUSS/DSCN MKR DOCD: CPT | Performed by: INTERNAL MEDICINE

## 2024-05-28 RX ORDER — PETROLATUM,WHITE/LANOLIN
OINTMENT (GRAM) TOPICAL
Qty: 425 G | Refills: 5 | Status: SHIPPED | OUTPATIENT
Start: 2024-05-28

## 2024-05-28 RX ORDER — NYSTATIN 100000 U/G
CREAM TOPICAL 2 TIMES DAILY PRN
Qty: 30 G | Refills: 5 | Status: SHIPPED | OUTPATIENT
Start: 2024-05-28

## 2024-05-28 RX ORDER — GALANTAMINE HYDROBROMIDE 4 MG/1
TABLET, FILM COATED ORAL
Qty: 60 TABLET | Refills: 0 | OUTPATIENT
Start: 2024-05-28

## 2024-05-28 ASSESSMENT — ENCOUNTER SYMPTOMS
ABDOMINAL PAIN: 0
CONSTIPATION: 0
SORE THROAT: 0
SHORTNESS OF BREATH: 0
DIARRHEA: 0

## 2024-05-28 ASSESSMENT — MINI MENTAL STATE EXAM
WHICH SEASON IS THIS?: 1
WHAT YEAR IS THIS?: 0
SUM ALL MMSE QUESTIONS FOR TOTAL SCORE [OUT OF 30].: 8
WHAT FLOOR ARE WE ON [IN FACILITY]?/ WHAT ROOM ARE WE IN [IN HOME]?: 0
SAY: I WOULD LIKE YOU TO COUNT BACKWARD FROM 100 BY SEVENS: 0
WHAT CITY/TOWN ARE WE IN?: 1
ASK THE PERSON IF HE IS RIGHT OR LEFT-HANDED. TAKE A PIECE OF PAPER AND HOLD IT UP IN
FRONT OF THE PERSON. SAY: TAKE THIS PAPER IN YOUR RIGHT/LEFT HAND (WHICHEVER IS NON-
DOMINANT), SCORE IF PAPER IS PICKED UP IN CORRECT HAND.: 1
WHAT STATE [OR PROVINCE] ARE WE IN?: 0
WHAT DAY OF THE WEEK IS THIS?: 0
SAY: PUT THE PAPER DOWN ON THE FLOOR, SCORE IF PAPER IS PLACED BACK ON FLOOR: 1
SHOW: WRISTWATCH [OBJECT] ASK: WHAT IS THIS CALLED?: 1
WHAT COUNTRY ARE WE IN?: 0
SAY: I AM GOING TO NAME THREE OBJECTS. WHEN I AM FINISHED, I WANT YOU TO REPEAT
THEM. REMEMBER WHAT THEY ARE BECAUSE I AM GOING TO ASK YOU TO NAME THEM AGAIN IN
A FEW MINUTES.  SAY THE FOLLOWING WORDS SLOWLY AT 1-SECOND INTERVALS - BALL/ CAR/ MAN [ITERATIONS FOR REPEAT ADMINISTRATION]: 1
HAND THE PERSON A PENCIL AND PAPER. SAY: WRITE ANY COMPLETE SENTENCE ON THAT
PIECE OF PAPER. (NOTE: THE SENTENCE MUST MAKE SENSE. IGNORE SPELLING ERRORS): 0
WHAT IS TODAY'S DATE?: 0
PLACE DESIGN, ERASER AND PENCIL IN FRONT OF THE PERSON.  SAY:  COPY THIS DESIGN PLEASE.  SHOW: DESIGN. ALLOW: MULTIPLE TRIES. WAIT UNTIL PERSON IS FINISHED AND HANDS IT BACK. SCORE: ONLY FOR DIAGRAM WITH 4-SIDED FIGURE BETWEEN TWO 5-SIDED FIGURES: 0
SAY: I WOULD LIKE YOU TO REPEAT THIS PHRASE AFTER ME: NO IFS, ANDS, OR BUTS.: 0
SHOW: PENCIL [OBJECT] ASK: WHAT IS THIS CALLED?: 1
SAY: READ THE WORDS ON THE PAGE AND THEN DO WHAT IT SAYS. THEN HAND THE PERSON
THE SHEET WITH CLOSE YOUR EYES ON IT. IF THE SUBJECT READS AND DOES NOT CLOSE THEIR EYES, REPEAT UP TO THREE TIMES. SCORE ONLY IF SUBJECT CLOSES EYES.: 0
WHAT IS THE NAME OF THIS BUILDING [IN FACILITY]?/WHAT IS THE STREET ADDRESS OF THIS HOUSE [IN HOME]?: 0
SAY: FOLD THE PAPER IN HALF ONCE WITH BOTH HANDS, SCORE IF PAPER IS CORRECTLY FOLDED IN HALF.: 1
WHAT MONTH IS THIS?: 0
NOW WHAT WERE THE THREE OBJECTS I ASKED YOU TO REMEMBER?: 0

## 2024-05-28 ASSESSMENT — PATIENT HEALTH QUESTIONNAIRE - PHQ9
7. TROUBLE CONCENTRATING ON THINGS, SUCH AS READING THE NEWSPAPER OR WATCHING TELEVISION: NOT AT ALL
SUM OF ALL RESPONSES TO PHQ QUESTIONS 1-9: 6
1. LITTLE INTEREST OR PLEASURE IN DOING THINGS: MORE THAN HALF THE DAYS
8. MOVING OR SPEAKING SO SLOWLY THAT OTHER PEOPLE COULD HAVE NOTICED. OR THE OPPOSITE, BEING SO FIGETY OR RESTLESS THAT YOU HAVE BEEN MOVING AROUND A LOT MORE THAN USUAL: NOT AT ALL
10. IF YOU CHECKED OFF ANY PROBLEMS, HOW DIFFICULT HAVE THESE PROBLEMS MADE IT FOR YOU TO DO YOUR WORK, TAKE CARE OF THINGS AT HOME, OR GET ALONG WITH OTHER PEOPLE: VERY DIFFICULT
SUM OF ALL RESPONSES TO PHQ QUESTIONS 1-9: 6
SUM OF ALL RESPONSES TO PHQ QUESTIONS 1-9: 6
6. FEELING BAD ABOUT YOURSELF - OR THAT YOU ARE A FAILURE OR HAVE LET YOURSELF OR YOUR FAMILY DOWN: NOT AT ALL
SUM OF ALL RESPONSES TO PHQ QUESTIONS 1-9: 6
SUM OF ALL RESPONSES TO PHQ9 QUESTIONS 1 & 2: 2
2. FEELING DOWN, DEPRESSED OR HOPELESS: NOT AT ALL
4. FEELING TIRED OR HAVING LITTLE ENERGY: NEARLY EVERY DAY
9. THOUGHTS THAT YOU WOULD BE BETTER OFF DEAD, OR OF HURTING YOURSELF: NOT AT ALL
3. TROUBLE FALLING OR STAYING ASLEEP: SEVERAL DAYS
5. POOR APPETITE OR OVEREATING: NOT AT ALL

## 2024-05-28 ASSESSMENT — LIFESTYLE VARIABLES
HOW MANY STANDARD DRINKS CONTAINING ALCOHOL DO YOU HAVE ON A TYPICAL DAY: PATIENT DOES NOT DRINK
HOW OFTEN DO YOU HAVE A DRINK CONTAINING ALCOHOL: NEVER

## 2024-05-28 NOTE — ASSESSMENT & PLAN NOTE
Continue with calcium, vitamin D supplementation.  Reviewed last vitamin D level at goal.  Continue vitamin D supplement.  Fosamax was stopped as of 2021 after 4 years of therapy.

## 2024-05-28 NOTE — ASSESSMENT & PLAN NOTE
Has yeast infection in gluteal fold at this time.  I have counseled to use the nystatin cream in this area.  Area under breast is clear continue nystatin.

## 2024-05-28 NOTE — PROGRESS NOTES
Medicare Annual Wellness Visit    Evelyn KIERAN Olson is here for Medicare AWV    Assessment & Plan   Medicare annual wellness visit, subsequent  Health maintenance reviewed and updated with patient today at visit.  Recommend RSV vaccine.  Yeast infection of the skin  Assessment & Plan:   Has yeast infection in gluteal fold at this time.  I have counseled to use the nystatin cream in this area.  Area under breast is clear continue nystatin.  Age-related osteoporosis without current pathological fracture  Assessment & Plan:   Continue with calcium, vitamin D supplementation.  Reviewed last vitamin D level at goal.  Continue vitamin D supplement.  Fosamax was stopped as of 2021 after 4 years of therapy.  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  Recommendations for Preventive Services Due: see orders and patient instructions/AVS.  Recommended screening schedule for the next 5-10 years is provided to the patient in written form: see Patient Instructions/AVS.     Return in about 6 months (around 11/28/2024) for FOLLOWUP.     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

## 2024-05-28 NOTE — ASSESSMENT & PLAN NOTE
Monitored by specialist- no acute findings meriting change in the plan   High Risk (score 12 or above)

## 2024-05-29 ENCOUNTER — CLINICAL DOCUMENTATION (OUTPATIENT)
Age: 74
End: 2024-05-29

## 2024-05-29 NOTE — PROGRESS NOTES
Faxed physical form and last progress note to Ralf Dasilva @ [phone]. Confirmation received.

## 2024-06-13 DIAGNOSIS — E78.00 HYPERCHOLESTEROLEMIA: ICD-10-CM

## 2024-06-13 DIAGNOSIS — R73.09 ELEVATED GLUCOSE: ICD-10-CM

## 2024-06-13 LAB
ALBUMIN SERPL-MCNC: 3.5 G/DL (ref 3.5–5)
ALBUMIN/GLOB SERPL: 0.9 (ref 1.1–2.2)
ALP SERPL-CCNC: 102 U/L (ref 45–117)
ALT SERPL-CCNC: 48 U/L (ref 12–78)
ANION GAP SERPL CALC-SCNC: 1 MMOL/L (ref 5–15)
AST SERPL-CCNC: 32 U/L (ref 15–37)
BILIRUB SERPL-MCNC: 0.5 MG/DL (ref 0.2–1)
BUN SERPL-MCNC: 21 MG/DL (ref 6–20)
BUN/CREAT SERPL: 22 (ref 12–20)
CALCIUM SERPL-MCNC: 8.9 MG/DL (ref 8.5–10.1)
CHLORIDE SERPL-SCNC: 108 MMOL/L (ref 97–108)
CHOLEST SERPL-MCNC: 228 MG/DL
CO2 SERPL-SCNC: 28 MMOL/L (ref 21–32)
CREAT SERPL-MCNC: 0.95 MG/DL (ref 0.55–1.02)
EST. AVERAGE GLUCOSE BLD GHB EST-MCNC: 105 MG/DL
GLOBULIN SER CALC-MCNC: 3.7 G/DL (ref 2–4)
GLUCOSE SERPL-MCNC: 90 MG/DL (ref 65–100)
HBA1C MFR BLD: 5.3 % (ref 4–5.6)
HDLC SERPL-MCNC: 54 MG/DL
HDLC SERPL: 4.2 (ref 0–5)
LDLC SERPL CALC-MCNC: 153 MG/DL (ref 0–100)
POTASSIUM SERPL-SCNC: 4.3 MMOL/L (ref 3.5–5.1)
PROT SERPL-MCNC: 7.2 G/DL (ref 6.4–8.2)
SODIUM SERPL-SCNC: 137 MMOL/L (ref 136–145)
TRIGL SERPL-MCNC: 105 MG/DL
VLDLC SERPL CALC-MCNC: 21 MG/DL

## 2024-06-17 ENCOUNTER — APPOINTMENT (OUTPATIENT)
Facility: HOSPITAL | Age: 74
End: 2024-06-17
Payer: MEDICARE

## 2024-06-17 ENCOUNTER — HOSPITAL ENCOUNTER (EMERGENCY)
Facility: HOSPITAL | Age: 74
Discharge: OTHER INSTITUTION | End: 2024-06-17
Attending: EMERGENCY MEDICINE
Payer: MEDICARE

## 2024-06-17 VITALS
RESPIRATION RATE: 18 BRPM | SYSTOLIC BLOOD PRESSURE: 139 MMHG | TEMPERATURE: 98.5 F | WEIGHT: 166.01 LBS | DIASTOLIC BLOOD PRESSURE: 89 MMHG | OXYGEN SATURATION: 97 % | HEART RATE: 62 BPM | BODY MASS INDEX: 33.53 KG/M2

## 2024-06-17 DIAGNOSIS — R74.01 TRANSAMINITIS: ICD-10-CM

## 2024-06-17 DIAGNOSIS — R11.2 NAUSEA AND VOMITING, UNSPECIFIED VOMITING TYPE: Primary | ICD-10-CM

## 2024-06-17 LAB
ALBUMIN SERPL-MCNC: 3.1 G/DL (ref 3.5–5)
ALBUMIN/GLOB SERPL: 0.7 (ref 1.1–2.2)
ALP SERPL-CCNC: 95 U/L (ref 45–117)
ALT SERPL-CCNC: 126 U/L (ref 12–78)
ANION GAP SERPL CALC-SCNC: 6 MMOL/L (ref 5–15)
APPEARANCE UR: CLEAR
AST SERPL-CCNC: 123 U/L (ref 15–37)
BACTERIA URNS QL MICRO: NEGATIVE /HPF
BASOPHILS # BLD: 0 K/UL (ref 0–0.1)
BASOPHILS NFR BLD: 0 % (ref 0–1)
BILIRUB SERPL-MCNC: 0.4 MG/DL (ref 0.2–1)
BILIRUB UR QL: NEGATIVE
BUN SERPL-MCNC: 11 MG/DL (ref 6–20)
BUN/CREAT SERPL: 12 (ref 12–20)
CALCIUM SERPL-MCNC: 9.1 MG/DL (ref 8.5–10.1)
CHLORIDE SERPL-SCNC: 106 MMOL/L (ref 97–108)
CO2 SERPL-SCNC: 23 MMOL/L (ref 21–32)
COLOR UR: ABNORMAL
COMMENT:: NORMAL
CREAT SERPL-MCNC: 0.89 MG/DL (ref 0.55–1.02)
DIFFERENTIAL METHOD BLD: ABNORMAL
EKG ATRIAL RATE: 65 BPM
EKG DIAGNOSIS: NORMAL
EKG P AXIS: 13 DEGREES
EKG P-R INTERVAL: 126 MS
EKG Q-T INTERVAL: 450 MS
EKG QRS DURATION: 74 MS
EKG QTC CALCULATION (BAZETT): 468 MS
EKG R AXIS: 9 DEGREES
EKG T AXIS: 9 DEGREES
EKG VENTRICULAR RATE: 65 BPM
EOSINOPHIL # BLD: 0 K/UL (ref 0–0.4)
EOSINOPHIL NFR BLD: 0 % (ref 0–7)
EPITH CASTS URNS QL MICRO: ABNORMAL /LPF
ERYTHROCYTE [DISTWIDTH] IN BLOOD BY AUTOMATED COUNT: 13.6 % (ref 11.5–14.5)
GLOBULIN SER CALC-MCNC: 4.2 G/DL (ref 2–4)
GLUCOSE SERPL-MCNC: 122 MG/DL (ref 65–100)
GLUCOSE UR STRIP.AUTO-MCNC: NEGATIVE MG/DL
HCT VFR BLD AUTO: 34.5 % (ref 35–47)
HGB BLD-MCNC: 11.7 G/DL (ref 11.5–16)
HGB UR QL STRIP: NEGATIVE
HYALINE CASTS URNS QL MICRO: ABNORMAL /LPF (ref 0–5)
IMM GRANULOCYTES # BLD AUTO: 0 K/UL (ref 0–0.04)
IMM GRANULOCYTES NFR BLD AUTO: 0 % (ref 0–0.5)
KETONES UR QL STRIP.AUTO: ABNORMAL MG/DL
LACTATE SERPL-SCNC: 1.5 MMOL/L (ref 0.4–2)
LEUKOCYTE ESTERASE UR QL STRIP.AUTO: NEGATIVE
LIPASE SERPL-CCNC: 20 U/L (ref 13–75)
LYMPHOCYTES # BLD: 0.5 K/UL (ref 0.8–3.5)
LYMPHOCYTES NFR BLD: 8 % (ref 12–49)
MCH RBC QN AUTO: 31.4 PG (ref 26–34)
MCHC RBC AUTO-ENTMCNC: 33.9 G/DL (ref 30–36.5)
MCV RBC AUTO: 92.5 FL (ref 80–99)
MONOCYTES # BLD: 0.5 K/UL (ref 0–1)
MONOCYTES NFR BLD: 9 % (ref 5–13)
NEUTS SEG # BLD: 4.9 K/UL (ref 1.8–8)
NEUTS SEG NFR BLD: 83 % (ref 32–75)
NITRITE UR QL STRIP.AUTO: NEGATIVE
NRBC # BLD: 0 K/UL (ref 0–0.01)
NRBC BLD-RTO: 0 PER 100 WBC
PH UR STRIP: 6.5 (ref 5–8)
PLATELET # BLD AUTO: 101 K/UL (ref 150–400)
PMV BLD AUTO: 12.6 FL (ref 8.9–12.9)
POTASSIUM SERPL-SCNC: 3.8 MMOL/L (ref 3.5–5.1)
PROCALCITONIN SERPL-MCNC: 0.05 NG/ML
PROT SERPL-MCNC: 7.3 G/DL (ref 6.4–8.2)
PROT UR STRIP-MCNC: 30 MG/DL
RBC # BLD AUTO: 3.73 M/UL (ref 3.8–5.2)
RBC #/AREA URNS HPF: ABNORMAL /HPF (ref 0–5)
RBC MORPH BLD: ABNORMAL
SODIUM SERPL-SCNC: 135 MMOL/L (ref 136–145)
SP GR UR REFRACTOMETRY: 1.03 (ref 1–1.03)
SPECIMEN HOLD: NORMAL
TROPONIN I SERPL HS-MCNC: 5 NG/L (ref 0–51)
URINE CULTURE IF INDICATED: ABNORMAL
UROBILINOGEN UR QL STRIP.AUTO: 1 EU/DL (ref 0.2–1)
WBC # BLD AUTO: 5.9 K/UL (ref 3.6–11)
WBC URNS QL MICRO: ABNORMAL /HPF (ref 0–4)

## 2024-06-17 PROCEDURE — 85025 COMPLETE CBC W/AUTO DIFF WBC: CPT

## 2024-06-17 PROCEDURE — 93005 ELECTROCARDIOGRAM TRACING: CPT | Performed by: EMERGENCY MEDICINE

## 2024-06-17 PROCEDURE — 87040 BLOOD CULTURE FOR BACTERIA: CPT

## 2024-06-17 PROCEDURE — 83690 ASSAY OF LIPASE: CPT

## 2024-06-17 PROCEDURE — 71045 X-RAY EXAM CHEST 1 VIEW: CPT

## 2024-06-17 PROCEDURE — 80053 COMPREHEN METABOLIC PANEL: CPT

## 2024-06-17 PROCEDURE — 6360000004 HC RX CONTRAST MEDICATION: Performed by: RADIOLOGY

## 2024-06-17 PROCEDURE — 84484 ASSAY OF TROPONIN QUANT: CPT

## 2024-06-17 PROCEDURE — 83605 ASSAY OF LACTIC ACID: CPT

## 2024-06-17 PROCEDURE — 74177 CT ABD & PELVIS W/CONTRAST: CPT

## 2024-06-17 PROCEDURE — 84145 PROCALCITONIN (PCT): CPT

## 2024-06-17 PROCEDURE — 99285 EMERGENCY DEPT VISIT HI MDM: CPT

## 2024-06-17 PROCEDURE — 6360000002 HC RX W HCPCS: Performed by: EMERGENCY MEDICINE

## 2024-06-17 PROCEDURE — 87154 CUL TYP ID BLD PTHGN 6+ TRGT: CPT

## 2024-06-17 PROCEDURE — 81001 URINALYSIS AUTO W/SCOPE: CPT

## 2024-06-17 PROCEDURE — 96374 THER/PROPH/DIAG INJ IV PUSH: CPT

## 2024-06-17 PROCEDURE — 36415 COLL VENOUS BLD VENIPUNCTURE: CPT

## 2024-06-17 RX ORDER — ONDANSETRON 4 MG/1
4 TABLET, ORALLY DISINTEGRATING ORAL 3 TIMES DAILY PRN
Qty: 21 TABLET | Refills: 0 | Status: SHIPPED | OUTPATIENT
Start: 2024-06-17

## 2024-06-17 RX ORDER — ONDANSETRON 2 MG/ML
4 INJECTION INTRAMUSCULAR; INTRAVENOUS ONCE
Status: COMPLETED | OUTPATIENT
Start: 2024-06-17 | End: 2024-06-17

## 2024-06-17 RX ADMIN — IOPAMIDOL 100 ML: 755 INJECTION, SOLUTION INTRAVENOUS at 08:43

## 2024-06-17 RX ADMIN — ONDANSETRON 4 MG: 2 INJECTION INTRAMUSCULAR; INTRAVENOUS at 07:33

## 2024-06-17 ASSESSMENT — PAIN SCALES - GENERAL
PAINLEVEL_OUTOF10: 6
PAINLEVEL_OUTOF10: 0

## 2024-06-17 ASSESSMENT — PAIN - FUNCTIONAL ASSESSMENT: PAIN_FUNCTIONAL_ASSESSMENT: 0-10

## 2024-06-17 NOTE — ED TRIAGE NOTES
Pt arrived to ED via EMS from group home with CC of N/V/D since sat.     [facility] and Dx with UTI- Rx of Bactrim given and started same day. Per care giver- pt has continued to decline.     Pt endorses \"feeling bad\" and lower abd pain

## 2024-06-17 NOTE — ED NOTES
Care handoff and bedside shift change report given to Randee (oncoming nurse) by Marina (offgoing nurse). Report included the following information ED Encounter Summary and ED SBAR.

## 2024-06-17 NOTE — ED NOTES
Discharge paperwork reviewed with patient and patient's caregiver. Verbalized understanding. Pt stable at discharge.

## 2024-06-17 NOTE — ED PROVIDER NOTES
[facility] EMERGENCY DEP  EMERGENCY DEPARTMENT ENCOUNTER      Pt Name: Evelyn Olson  MRN: [id number]  Birthdate 1950  Date of evaluation: 6/17/2024  Provider: Boy Chambers DO    CHIEF COMPLAINT       Chief Complaint   Patient presents with    Cystitis         HISTORY OF PRESENT ILLNESS   (Location/Symptom, Timing/Onset, Context/Setting, Quality, Duration, Modifying Factors, Severity)  Note limiting factors.   73-year-old female from a group home presents with reports of unsteady gait nausea vomiting and change in mental status.  Patient recently [facility] with B[facility] after going on for nausea and vomiting.  Patient apparently had worsening of symptoms today.  Came to the ER for reports of nausea vomiting and confusion.  Patient is reported to be slightly at her baseline per her group home employee that is with her.  Patient complains of some generalized abdominal pain and some nausea and vomiting.  Otherwise review of systems unobtainable from patient.            Review of External Medical Records:     Nursing Notes were reviewed.    REVIEW OF SYSTEMS    (2-9 systems for level 4, 10 or more for level 5)     Review of Systems    Except as noted above the remainder of the review of systems was reviewed and negative.       PAST MEDICAL HISTORY     Past Medical History:   Diagnosis Date    Allergic rhinitis, cause unspecified 7/15/2010    Anxiety     Detached retina     Developmental delay 11/23/2015    Diverticulosis     Fracture     left wrist fx & surgery; pt fell     GERD (gastroesophageal reflux disease)     HTN (hypertension)     Memory disorder     Mental retardation     Osteoporosis 8/5/2016    Other and unspecified hyperlipidemia     S/P colonoscopy 03/01/08    Snoring     Thrombocytopenia (HCC)          SURGICAL HISTORY       Past Surgical History:   Procedure Laterality Date    CATARACT REMOVAL      COLONOSCOPY  3-5-2008    OVARIAN CYST REMOVAL

## 2024-06-18 LAB
ACCESSION NUMBER, LLC1M: ABNORMAL
ACINETOBACTER CALCOAC BAUMANNII COMPLEX BY PCR: NOT DETECTED
B FRAGILIS DNA BLD POS QL NAA+NON-PROBE: NOT DETECTED
BIOFIRE TEST COMMENT: ABNORMAL
C ALBICANS DNA BLD POS QL NAA+NON-PROBE: NOT DETECTED
C AURIS DNA BLD POS QL NAA+NON-PROBE: NOT DETECTED
C GATTII+NEOFOR DNA BLD POS QL NAA+N-PRB: NOT DETECTED
C GLABRATA DNA BLD POS QL NAA+NON-PROBE: NOT DETECTED
C KRUSEI DNA BLD POS QL NAA+NON-PROBE: NOT DETECTED
C PARAP DNA BLD POS QL NAA+NON-PROBE: NOT DETECTED
C TROPICLS DNA BLD POS QL NAA+NON-PROBE: NOT DETECTED
E CLOAC COMP DNA BLD POS NAA+NON-PROBE: NOT DETECTED
E COLI DNA BLD POS QL NAA+NON-PROBE: NOT DETECTED
E FAECALIS DNA BLD POS QL NAA+NON-PROBE: NOT DETECTED
E FAECIUM DNA BLD POS QL NAA+NON-PROBE: NOT DETECTED
ENTEROBACTERALES DNA BLD POS NAA+N-PRB: NOT DETECTED
GP B STREP DNA BLD POS QL NAA+NON-PROBE: NOT DETECTED
HAEM INFLU DNA BLD POS QL NAA+NON-PROBE: NOT DETECTED
K OXYTOCA DNA BLD POS QL NAA+NON-PROBE: NOT DETECTED
KLEBSIELLA SP DNA BLD POS QL NAA+NON-PRB: NOT DETECTED
KLEBSIELLA SP DNA BLD POS QL NAA+NON-PRB: NOT DETECTED
L MONOCYTOG DNA BLD POS QL NAA+NON-PROBE: NOT DETECTED
N MEN DNA BLD POS QL NAA+NON-PROBE: NOT DETECTED
P AERUGINOSA DNA BLD POS NAA+NON-PROBE: NOT DETECTED
PROTEUS SP DNA BLD POS QL NAA+NON-PROBE: NOT DETECTED
RESISTANT GENE TARGETS: ABNORMAL
S AUREUS DNA BLD POS QL NAA+NON-PROBE: NOT DETECTED
S AUREUS+CONS DNA BLD POS NAA+NON-PROBE: DETECTED
S EPIDERMIDIS DNA BLD POS QL NAA+NON-PRB: NOT DETECTED
S LUGDUNENSIS DNA BLD POS QL NAA+NON-PRB: NOT DETECTED
S MALTOPHILIA DNA BLD POS QL NAA+NON-PRB: NOT DETECTED
S MARCESCENS DNA BLD POS NAA+NON-PROBE: NOT DETECTED
S PNEUM DNA BLD POS QL NAA+NON-PROBE: NOT DETECTED
S PYO DNA BLD POS QL NAA+NON-PROBE: NOT DETECTED
SALMONELLA DNA BLD POS QL NAA+NON-PROBE: NOT DETECTED
STREPTOCOCCUS DNA BLD POS NAA+NON-PROBE: NOT DETECTED

## 2024-06-18 NOTE — ED NOTES
Nursing home director called this morning to return a phone call from the message Jes left overnight.  I discussed the finding with the nursing home director that there are gram-positive cocci growing in 1 of 2 bottles drawn for blood cultures which typically represents contamination.  She reports patient is feeling well this morning.  I instructed her to monitor the patient closely and have a low threshold to return if she has any new or worsening symptoms however it is reasonable to monitor the patient in the home as this likely does reflect contamination.  Nursing home director verbalized understanding.  She stated she will call back with any other questions.     Barb Addison PA-C  06/18/24 0857

## 2024-06-18 NOTE — ED NOTES
Critical results from lab, gram-positive cocci in clusters in 1 of 2 tubes.  Attempted to contact patient, she resides in group home.  Attempts were unsuccessful, did leave voicemails.      Jes Bear PA-C  06/18/24 0158

## 2024-06-20 LAB
BACTERIA SPEC CULT: ABNORMAL
SERVICE CMNT-IMP: ABNORMAL
SERVICE CMNT-IMP: ABNORMAL

## 2024-06-21 ENCOUNTER — APPOINTMENT (OUTPATIENT)
Facility: HOSPITAL | Age: 74
End: 2024-06-21
Payer: MEDICARE

## 2024-06-21 ENCOUNTER — HOSPITAL ENCOUNTER (EMERGENCY)
Facility: HOSPITAL | Age: 74
Discharge: HOME | End: 2024-06-21
Attending: EMERGENCY MEDICINE
Payer: MEDICARE

## 2024-06-21 VITALS
HEIGHT: 59 IN | HEART RATE: 66 BPM | OXYGEN SATURATION: 94 % | WEIGHT: 165.34 LBS | RESPIRATION RATE: 16 BRPM | SYSTOLIC BLOOD PRESSURE: 156 MMHG | TEMPERATURE: 97.6 F | DIASTOLIC BLOOD PRESSURE: 106 MMHG | BODY MASS INDEX: 33.33 KG/M2

## 2024-06-21 DIAGNOSIS — B37.2 YEAST INFECTION OF THE SKIN: ICD-10-CM
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
## 2024-06-21 DIAGNOSIS — S09.90XA CLOSED HEAD INJURY, INITIAL ENCOUNTER: ICD-10-CM

## 2024-06-21 LAB
ALBUMIN SERPL-MCNC: 3.1 G/DL (ref 3.5–5)
ALBUMIN/GLOB SERPL: 0.7 (ref 1.1–2.2)
ALP SERPL-CCNC: 103 U/L (ref 45–117)
ALT SERPL-CCNC: 137 U/L (ref 12–78)
ANION GAP SERPL CALC-SCNC: 5 MMOL/L (ref 5–15)
APPEARANCE UR: CLEAR
AST SERPL-CCNC: 82 U/L (ref 15–37)
BACTERIA URNS QL MICRO: NEGATIVE /HPF
BASOPHILS # BLD: 0 K/UL (ref 0–0.1)
BASOPHILS NFR BLD: 1 % (ref 0–1)
BILIRUB SERPL-MCNC: 0.4 MG/DL (ref 0.2–1)
BILIRUB UR QL: NEGATIVE
BUN SERPL-MCNC: 18 MG/DL (ref 6–20)
BUN/CREAT SERPL: 18 (ref 12–20)
CALCIUM SERPL-MCNC: 9.2 MG/DL (ref 8.5–10.1)
CHLORIDE SERPL-SCNC: 107 MMOL/L (ref 97–108)
CO2 SERPL-SCNC: 25 MMOL/L (ref 21–32)
COLOR UR: ABNORMAL
COMMENT:: NORMAL
CREAT SERPL-MCNC: 1.02 MG/DL (ref 0.55–1.02)
DIFFERENTIAL METHOD BLD: ABNORMAL
EKG ATRIAL RATE: 69 BPM
EKG DIAGNOSIS: NORMAL
EKG P AXIS: 17 DEGREES
EKG P-R INTERVAL: 118 MS
EKG Q-T INTERVAL: 414 MS
EKG QRS DURATION: 66 MS
EKG QTC CALCULATION (BAZETT): 443 MS
EKG R AXIS: 0 DEGREES
EKG T AXIS: 31 DEGREES
EKG VENTRICULAR RATE: 69 BPM
EOSINOPHIL # BLD: 0.2 K/UL (ref 0–0.4)
EOSINOPHIL NFR BLD: 2 % (ref 0–7)
EPITH CASTS URNS QL MICRO: ABNORMAL /LPF
ERYTHROCYTE [DISTWIDTH] IN BLOOD BY AUTOMATED COUNT: 14 % (ref 11.5–14.5)
GLOBULIN SER CALC-MCNC: 4.4 G/DL (ref 2–4)
GLUCOSE SERPL-MCNC: 98 MG/DL (ref 65–100)
GLUCOSE UR STRIP.AUTO-MCNC: NEGATIVE MG/DL
HCT VFR BLD AUTO: 38.8 % (ref 35–47)
HGB BLD-MCNC: 13 G/DL (ref 11.5–16)
HGB UR QL STRIP: ABNORMAL
HYALINE CASTS URNS QL MICRO: ABNORMAL /LPF (ref 0–5)
IMM GRANULOCYTES # BLD AUTO: 0 K/UL (ref 0–0.04)
IMM GRANULOCYTES NFR BLD AUTO: 0 % (ref 0–0.5)
KETONES UR QL STRIP.AUTO: NEGATIVE MG/DL
LEUKOCYTE ESTERASE UR QL STRIP.AUTO: ABNORMAL
LYMPHOCYTES # BLD: 1.2 K/UL (ref 0.8–3.5)
LYMPHOCYTES NFR BLD: 16 % (ref 12–49)
MCH RBC QN AUTO: 30.4 PG (ref 26–34)
MCHC RBC AUTO-ENTMCNC: 33.5 G/DL (ref 30–36.5)
MCV RBC AUTO: 90.9 FL (ref 80–99)
MONOCYTES # BLD: 0.6 K/UL (ref 0–1)
MONOCYTES NFR BLD: 8 % (ref 5–13)
NEUTS SEG # BLD: 5.1 K/UL (ref 1.8–8)
NEUTS SEG NFR BLD: 72 % (ref 32–75)
NITRITE UR QL STRIP.AUTO: NEGATIVE
NRBC # BLD: 0 K/UL (ref 0–0.01)
NRBC BLD-RTO: 0 PER 100 WBC
PH UR STRIP: 7 (ref 5–8)
PLATELET # BLD AUTO: 131 K/UL (ref 150–400)
PMV BLD AUTO: 11.9 FL (ref 8.9–12.9)
POTASSIUM SERPL-SCNC: 4.4 MMOL/L (ref 3.5–5.1)
PROT SERPL-MCNC: 7.5 G/DL (ref 6.4–8.2)
PROT UR STRIP-MCNC: NEGATIVE MG/DL
RBC # BLD AUTO: 4.27 M/UL (ref 3.8–5.2)
RBC #/AREA URNS HPF: ABNORMAL /HPF (ref 0–5)
SODIUM SERPL-SCNC: 137 MMOL/L (ref 136–145)
SP GR UR REFRACTOMETRY: 1.02 (ref 1–1.03)
SPECIMEN HOLD: NORMAL
SPECIMEN HOLD: NORMAL
TROPONIN I SERPL HS-MCNC: <4 NG/L (ref 0–51)
UROBILINOGEN UR QL STRIP.AUTO: 1 EU/DL (ref 0.2–1)
WBC # BLD AUTO: 7.1 K/UL (ref 3.6–11)
WBC URNS QL MICRO: ABNORMAL /HPF (ref 0–4)

## 2024-06-21 PROCEDURE — 2580000003 HC RX 258: Performed by: EMERGENCY MEDICINE

## 2024-06-21 PROCEDURE — 81001 URINALYSIS AUTO W/SCOPE: CPT

## 2024-06-21 PROCEDURE — 80053 COMPREHEN METABOLIC PANEL: CPT

## 2024-06-21 PROCEDURE — 70450 CT HEAD/BRAIN W/O DYE: CPT

## 2024-06-21 PROCEDURE — 84484 ASSAY OF TROPONIN QUANT: CPT

## 2024-06-21 PROCEDURE — 85025 COMPLETE CBC W/AUTO DIFF WBC: CPT

## 2024-06-21 PROCEDURE — 99285 EMERGENCY DEPT VISIT HI MDM: CPT

## 2024-06-21 PROCEDURE — 93005 ELECTROCARDIOGRAM TRACING: CPT | Performed by: EMERGENCY MEDICINE

## 2024-06-21 PROCEDURE — 71045 X-RAY EXAM CHEST 1 VIEW: CPT

## 2024-06-21 PROCEDURE — 36415 COLL VENOUS BLD VENIPUNCTURE: CPT

## 2024-06-21 RX ORDER — 0.9 % SODIUM CHLORIDE 0.9 %
1000 INTRAVENOUS SOLUTION INTRAVENOUS ONCE
Status: COMPLETED | OUTPATIENT
Start: 2024-06-21 | End: 2024-06-21

## 2024-06-21 RX ADMIN — SODIUM CHLORIDE 1000 ML: 9 INJECTION, SOLUTION INTRAVENOUS at 07:22

## 2024-06-21 ASSESSMENT — PAIN SCALES - GENERAL
PAINLEVEL_OUTOF10: 0

## 2024-06-21 ASSESSMENT — PAIN - FUNCTIONAL ASSESSMENT: PAIN_FUNCTIONAL_ASSESSMENT: 0-10

## 2024-06-21 NOTE — ED NOTES
Bedside shift change report given to Saida RN (oncoming nurse) by Rayna RN (offgoing nurse). Report included the following information Nurse Handoff Report, ED Encounter Summary, MAR, and Recent Results.

## 2024-06-21 NOTE — ED TRIAGE NOTES
Pt wheeled into triage room from [facility] with staff member. Pt has dementia and an intellectual disability per staff member. Pt had an unwitnessed fall where staff found her on the ground around 3:30am. Pt had reported that her head was also hurting before the fall. Pt unable to recall the event. Pt A&O x2 in triage

## 2024-06-21 NOTE — DISCHARGE INSTRUCTIONS
Routine appointments for health maintenance with a primary care provider are very important and emergency department visits are no substitute.  You should review all findings and test results from your visit today with your primary care physician.        We recommended that you take medications as prescribed.        Return to the emergency department for any new or concerning signs/symptoms or failure to improve.

## 2024-06-21 NOTE — ED PROVIDER NOTES
ED SIGN OUT NOTE  Care assumed at [facility] 8:03 AM EDT    Patient was signed out to me by Dr. Pandya.     Patient is awaiting imaging studies.    /67   Pulse 77   Temp 98.3 °F (36.8 °C) (Axillary)   Resp 18   Wt 74.2 kg (163 lb 9.3 oz)   SpO2 93%   BMI 33.04 kg/m²     Labs Reviewed   CBC WITH AUTO DIFFERENTIAL - Abnormal; Notable for the following components:       Result Value    Platelets 131 (*)     All other components within normal limits   EXTRA TUBES HOLD   COMPREHENSIVE METABOLIC PANEL   URINALYSIS WITH MICROSCOPIC   TROPONIN     CT Head W/O Contrast   Final Result   No acute changes.      Electronically signed by TYRESE MAZARIEGOS MD      XR CHEST PORTABLE   Final Result      No change      Electronically signed by TYRESE MAZARIEGOS MD          ED Course as of 06/21/24 0803 Fri Jun 21, 2024   0559 ED EKG interpretation:  Rhythm: normal sinus rhythm; and regular . Rate (approx.): 69; Axis: normal; ST/T wave: normal; No STEMI. [AL]      ED Course User Index  [AL] Yumi Pandya MD       Diagnosis:   No diagnosis found.    Disposition:        Plan:   Patient was handed off to me via shift change.  She is a 73-year-old female coming from a group home for a fall.  Workup including labs, CT head, chest x-ray, UA are within normal limits.  Patient is accompanied by her group home representative.  They were able to contact her son who states that he prefers her coming back to the group home if her workup is negative.  I do believe it is safe for the patient to be discharged home.  Patient is able to ambulate without difficulty.  Patient understands and agrees with the plan.  Patient will be discharged home under stable condition at this time.          DO Isaak Duran Mahum, DO  06/21/24 0082    
EXAM     Vitals:    Vitals:    06/21/24 0543   BP: (!) 122/53   Pulse: 77   Resp: 18   Temp: 98.3 °F (36.8 °C)   TempSrc: Axillary   SpO2: 98%   Weight: 74.2 kg (163 lb 9.3 oz)       Physical Exam  Constitutional:       General: She is not in acute distress.     Appearance: Normal appearance. She is not ill-appearing, toxic-appearing or diaphoretic.   HENT:      Head: Normocephalic.      Right Ear: External ear normal.      Left Ear: External ear normal.      Nose: Nose normal.      Mouth/Throat:      Mouth: Mucous membranes are moist.   Eyes:      General: No scleral icterus.  Cardiovascular:      Rate and Rhythm: Normal rate and regular rhythm.   Pulmonary:      Effort: Pulmonary effort is normal. No respiratory distress.      Breath sounds: No stridor.   Chest:      Chest wall: No tenderness.   Abdominal:      General: There is no distension.      Tenderness: There is no abdominal tenderness.   Musculoskeletal:         General: No deformity.      Cervical back: Neck supple. No rigidity.   Skin:     Coloration: Skin is not jaundiced.   Neurological:      Mental Status: Mental status is at baseline.   Psychiatric:         Behavior: Behavior normal.         DIAGNOSTIC RESULTS       RADIOLOGY:   Non-plain film images such as CT, Ultrasound and MRI are read by the radiologist. Plain radiographic images are visualized and preliminarily interpreted by the emergency physician with the below findings:    See ED course below.     Interpretation per the Radiologist below, if available at the time of this note:    XR CHEST PORTABLE    (Results Pending)   CT Head W/O Contrast    (Results Pending)        LABS:  Labs Reviewed   CBC WITH AUTO DIFFERENTIAL   TROPONIN   COMPREHENSIVE METABOLIC PANEL   URINALYSIS WITH MICROSCOPIC       All other labs were within normal range or not returned as of this dictation.    EMERGENCY DEPARTMENT COURSE and DIFFERENTIAL DIAGNOSIS/MDM:   Medical Decision Making  Had a discussion with the

## 2024-06-24 RX ORDER — NYSTATIN 100000 [USP'U]/G
POWDER TOPICAL
Qty: 60 G | Refills: 0 | Status: SHIPPED | OUTPATIENT
Start: 2024-06-24

## 2024-06-24 RX ORDER — KETOCONAZOLE 20 MG/G
CREAM TOPICAL
Qty: 60 G | Refills: 0 | Status: SHIPPED | OUTPATIENT
Start: 2024-06-24

## 2024-06-24 RX ORDER — CALCIUM CARBONATE 500 MG/1
TABLET, CHEWABLE ORAL
Qty: 180 TABLET | Refills: 0 | Status: SHIPPED | OUTPATIENT
Start: 2024-06-24

## 2024-06-24 RX ORDER — ESOMEPRAZOLE MAGNESIUM 40 MG/1
CAPSULE, DELAYED RELEASE ORAL
Qty: 90 CAPSULE | Refills: 0 | Status: SHIPPED | OUTPATIENT
Start: 2024-06-24

## 2024-06-24 NOTE — TELEPHONE ENCOUNTER
Chief Complaint   Patient presents with    Medication Refill     Last Appointment with Dr. Denisha Jorge 5/28/24    Future Appointments   Date Time Provider Department Center   6/27/2024  9:30 AM Robbi Carrillo DO [facility] AMB   9/25/2024  2:00 PM Casper Frank APRN - NP [facility] AMB   11/13/2024  9:45 AM Denisha Jorge MD [facility] AMB     [facility]

## 2024-06-25 NOTE — TELEPHONE ENCOUNTER
Chief Complaint   Patient presents with    Medication Refill     Last Appointment with Dr. Jorge:  5/28/2024   Future Appointments   Date Time Provider Department Center   6/27/2024  9:30 AM Robbi Carrillo DO [facility] AMB   9/25/2024  2:00 PM Casper Frank APRN - NP [facility] AMB   11/13/2024  9:45 AM Denisha Jorge MD [facility] AMB

## 2024-06-27 ENCOUNTER — OFFICE VISIT (OUTPATIENT)
Age: 74
End: 2024-06-27
Payer: MEDICARE

## 2024-06-27 VITALS
BODY MASS INDEX: 32.05 KG/M2 | DIASTOLIC BLOOD PRESSURE: 81 MMHG | HEIGHT: 59 IN | RESPIRATION RATE: 16 BRPM | OXYGEN SATURATION: 97 % | WEIGHT: 159 LBS | TEMPERATURE: 98 F | SYSTOLIC BLOOD PRESSURE: 121 MMHG | HEART RATE: 70 BPM
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
## 2024-06-27 PROCEDURE — 1036F TOBACCO NON-USER: CPT | Performed by: STUDENT IN AN ORGANIZED HEALTH CARE EDUCATION/TRAINING PROGRAM

## 2024-06-27 PROCEDURE — 99213 OFFICE O/P EST LOW 20 MIN: CPT | Performed by: STUDENT IN AN ORGANIZED HEALTH CARE EDUCATION/TRAINING PROGRAM

## 2024-06-27 PROCEDURE — 1090F PRES/ABSN URINE INCON ASSESS: CPT | Performed by: STUDENT IN AN ORGANIZED HEALTH CARE EDUCATION/TRAINING PROGRAM

## 2024-06-27 PROCEDURE — G8417 CALC BMI ABV UP PARAM F/U: HCPCS | Performed by: STUDENT IN AN ORGANIZED HEALTH CARE EDUCATION/TRAINING PROGRAM

## 2024-06-27 PROCEDURE — 1123F ACP DISCUSS/DSCN MKR DOCD: CPT | Performed by: STUDENT IN AN ORGANIZED HEALTH CARE EDUCATION/TRAINING PROGRAM

## 2024-06-27 PROCEDURE — 3017F COLORECTAL CA SCREEN DOC REV: CPT | Performed by: STUDENT IN AN ORGANIZED HEALTH CARE EDUCATION/TRAINING PROGRAM

## 2024-06-27 PROCEDURE — G8427 DOCREV CUR MEDS BY ELIG CLIN: HCPCS | Performed by: STUDENT IN AN ORGANIZED HEALTH CARE EDUCATION/TRAINING PROGRAM

## 2024-06-27 PROCEDURE — G8399 PT W/DXA RESULTS DOCUMENT: HCPCS | Performed by: STUDENT IN AN ORGANIZED HEALTH CARE EDUCATION/TRAINING PROGRAM

## 2024-06-27 NOTE — PROGRESS NOTES
times a week     Attends [employer] Services: More than 4 times per year     Active Member of Clubs or Organizations: Yes     Attends Club or Organization Meetings: Never     Marital Status: Never    Intimate Partner Violence: Not on file   Depression: At risk (5/28/2024)    PHQ-2     PHQ-2 Score: 6   Housing Stability: Low Risk  (11/10/2023)    Housing Stability Vital Sign     Unable to Pay for Housing in the Last Year: No     Number of Places Lived in the Last Year: 1     Unstable Housing in the Last Year: No   Interpersonal Safety: Not At Risk (7/24/2023)    Interpersonal Safety Domain Source: IP Abuse Screening     Physical abuse: Denies     Verbal abuse: Denies     Emotional abuse: Denies     Financial abuse: Denies     Sexual abuse: Denies   Utilities: Not At Risk (11/10/2023)    [facility] Utilities     Threatened with loss of utilities: No       
  QUEtiapine (SEROQUEL) 50 MG tablet Take 1 tablet by mouth 2 times daily 10/25/22  Yes Automatic Reconciliation, Ar         Objective:     Vitals:    06/27/24 0920   BP: 121/81   Pulse: 70   Resp: 16   Temp: 98 °F (36.7 °C)   SpO2: 97%       Physical Exam  Constitutional:       General: She is not in acute distress.     Appearance: Normal appearance.   Eyes:      General: No scleral icterus.     Conjunctiva/sclera: Conjunctivae normal.   Pulmonary:      Effort: Pulmonary effort is normal. No respiratory distress.   Skin:     General: Skin is warm and dry.      Findings: No rash.   Neurological:      General: No focal deficit present.      Mental Status: She is alert and oriented to person, place, and time.   Psychiatric:         Mood and Affect: Mood normal.         Behavior: Behavior normal.            ASSESSMENT/PLAN:  Below is the assessment and plan developed based on review of pertinent history, physical exam, labs, studies, and medications.    1. Fall, initial encounter  -     External Referral To Physical Therapy  -     DME Order for (Specify) as OP  - Provided with order to use bed alarm for now to avoid injury getting up unassisted at night  - Also provided order for outpt PT at [facility]      RT in 5 months for followup with Dr Jorge as scheduled    Robbi Carrillo DO

## 2024-07-11 RX ORDER — ERGOCALCIFEROL 1.25 MG/1
CAPSULE ORAL
Qty: 12 CAPSULE | Refills: 3 | Status: SHIPPED | OUTPATIENT
Start: 2024-07-11

## 2024-07-13 ENCOUNTER — HOSPITAL ENCOUNTER (EMERGENCY)
Facility: HOSPITAL | Age: 74
Discharge: NURSING, REHAB OR LONG-TERM CARE | End: 2024-07-13
Attending: STUDENT IN AN ORGANIZED HEALTH CARE EDUCATION/TRAINING PROGRAM
Payer: MEDICARE

## 2024-07-13 VITALS
HEIGHT: 59 IN | SYSTOLIC BLOOD PRESSURE: 125 MMHG | BODY MASS INDEX: 33.42 KG/M2 | OXYGEN SATURATION: 97 % | TEMPERATURE: 97.7 F | WEIGHT: 165.79 LBS | HEART RATE: 70 BPM | DIASTOLIC BLOOD PRESSURE: 68 MMHG | RESPIRATION RATE: 20 BRPM

## 2024-07-13 DIAGNOSIS — R30.0 DYSURIA: Primary | ICD-10-CM

## 2024-07-13 DIAGNOSIS — N76.0 VULVOVAGINITIS: ICD-10-CM

## 2024-07-13 LAB
ALBUMIN SERPL-MCNC: 3 G/DL (ref 3.5–5)
ALBUMIN/GLOB SERPL: 0.8 (ref 1.1–2.2)
ALP SERPL-CCNC: 102 U/L (ref 45–117)
ALT SERPL-CCNC: 44 U/L (ref 12–78)
ANION GAP SERPL CALC-SCNC: 3 MMOL/L (ref 5–15)
APPEARANCE UR: CLEAR
AST SERPL-CCNC: 35 U/L (ref 15–37)
BACTERIA URNS QL MICRO: NEGATIVE /HPF
BASOPHILS # BLD: 0 K/UL (ref 0–0.1)
BASOPHILS NFR BLD: 1 % (ref 0–1)
BILIRUB SERPL-MCNC: 0.3 MG/DL (ref 0.2–1)
BILIRUB UR QL: NEGATIVE
BUN SERPL-MCNC: 20 MG/DL (ref 6–20)
BUN/CREAT SERPL: 20 (ref 12–20)
CALCIUM SERPL-MCNC: 8.5 MG/DL (ref 8.5–10.1)
CHLORIDE SERPL-SCNC: 108 MMOL/L (ref 97–108)
CO2 SERPL-SCNC: 28 MMOL/L (ref 21–32)
COLOR UR: NORMAL
COMMENT:: NORMAL
CREAT SERPL-MCNC: 1.02 MG/DL (ref 0.55–1.02)
DIFFERENTIAL METHOD BLD: ABNORMAL
EOSINOPHIL # BLD: 0.1 K/UL (ref 0–0.4)
EOSINOPHIL NFR BLD: 2 % (ref 0–7)
EPITH CASTS URNS QL MICRO: NORMAL /LPF
ERYTHROCYTE [DISTWIDTH] IN BLOOD BY AUTOMATED COUNT: 14.8 % (ref 11.5–14.5)
GLOBULIN SER CALC-MCNC: 3.7 G/DL (ref 2–4)
GLUCOSE SERPL-MCNC: 112 MG/DL (ref 65–100)
GLUCOSE UR STRIP.AUTO-MCNC: NEGATIVE MG/DL
HCT VFR BLD AUTO: 35.8 % (ref 35–47)
HGB BLD-MCNC: 12.5 G/DL (ref 11.5–16)
HGB UR QL STRIP: NEGATIVE
HYALINE CASTS URNS QL MICRO: NORMAL /LPF (ref 0–5)
IMM GRANULOCYTES # BLD AUTO: 0 K/UL (ref 0–0.04)
IMM GRANULOCYTES NFR BLD AUTO: 0 % (ref 0–0.5)
KETONES UR QL STRIP.AUTO: NEGATIVE MG/DL
LEUKOCYTE ESTERASE UR QL STRIP.AUTO: NEGATIVE
LIPASE SERPL-CCNC: 45 U/L (ref 13–75)
LYMPHOCYTES # BLD: 1.1 K/UL (ref 0.8–3.5)
LYMPHOCYTES NFR BLD: 24 % (ref 12–49)
MAGNESIUM SERPL-MCNC: 1.9 MG/DL (ref 1.6–2.4)
MCH RBC QN AUTO: 32.6 PG (ref 26–34)
MCHC RBC AUTO-ENTMCNC: 34.9 G/DL (ref 30–36.5)
MCV RBC AUTO: 93.2 FL (ref 80–99)
MONOCYTES # BLD: 0.4 K/UL (ref 0–1)
MONOCYTES NFR BLD: 9 % (ref 5–13)
NEUTS SEG # BLD: 2.9 K/UL (ref 1.8–8)
NEUTS SEG NFR BLD: 64 % (ref 32–75)
NITRITE UR QL STRIP.AUTO: NEGATIVE
NRBC # BLD: 0 K/UL (ref 0–0.01)
NRBC BLD-RTO: 0 PER 100 WBC
PH UR STRIP: 6 (ref 5–8)
PLATELET # BLD AUTO: 117 K/UL (ref 150–400)
PMV BLD AUTO: 13 FL (ref 8.9–12.9)
POTASSIUM SERPL-SCNC: 4.3 MMOL/L (ref 3.5–5.1)
PROT SERPL-MCNC: 6.7 G/DL (ref 6.4–8.2)
PROT UR STRIP-MCNC: NEGATIVE MG/DL
RBC # BLD AUTO: 3.84 M/UL (ref 3.8–5.2)
RBC #/AREA URNS HPF: NORMAL /HPF (ref 0–5)
SODIUM SERPL-SCNC: 139 MMOL/L (ref 136–145)
SP GR UR REFRACTOMETRY: 1.02 (ref 1–1.03)
SPECIMEN HOLD: NORMAL
UROBILINOGEN UR QL STRIP.AUTO: 1 EU/DL (ref 0.2–1)
WBC # BLD AUTO: 4.6 K/UL (ref 3.6–11)
WBC URNS QL MICRO: NORMAL /HPF (ref 0–4)

## 2024-07-13 PROCEDURE — 80053 COMPREHEN METABOLIC PANEL: CPT

## 2024-07-13 PROCEDURE — 83735 ASSAY OF MAGNESIUM: CPT

## 2024-07-13 PROCEDURE — 96361 HYDRATE IV INFUSION ADD-ON: CPT

## 2024-07-13 PROCEDURE — 6360000002 HC RX W HCPCS: Performed by: STUDENT IN AN ORGANIZED HEALTH CARE EDUCATION/TRAINING PROGRAM

## 2024-07-13 PROCEDURE — 2580000003 HC RX 258: Performed by: STUDENT IN AN ORGANIZED HEALTH CARE EDUCATION/TRAINING PROGRAM

## 2024-07-13 PROCEDURE — 87086 URINE CULTURE/COLONY COUNT: CPT

## 2024-07-13 PROCEDURE — 96374 THER/PROPH/DIAG INJ IV PUSH: CPT

## 2024-07-13 PROCEDURE — 99284 EMERGENCY DEPT VISIT MOD MDM: CPT

## 2024-07-13 PROCEDURE — 85025 COMPLETE CBC W/AUTO DIFF WBC: CPT

## 2024-07-13 PROCEDURE — 2580000003 HC RX 258: Performed by: EMERGENCY MEDICINE

## 2024-07-13 PROCEDURE — 81001 URINALYSIS AUTO W/SCOPE: CPT

## 2024-07-13 PROCEDURE — 83690 ASSAY OF LIPASE: CPT

## 2024-07-13 PROCEDURE — 6370000000 HC RX 637 (ALT 250 FOR IP): Performed by: STUDENT IN AN ORGANIZED HEALTH CARE EDUCATION/TRAINING PROGRAM

## 2024-07-13 PROCEDURE — 36415 COLL VENOUS BLD VENIPUNCTURE: CPT

## 2024-07-13 RX ORDER — FLUCONAZOLE 100 MG/1
200 TABLET ORAL
Status: COMPLETED | OUTPATIENT
Start: 2024-07-13 | End: 2024-07-13

## 2024-07-13 RX ORDER — FLUCONAZOLE 150 MG/1
150 TABLET ORAL
Qty: 3 TABLET | Refills: 0 | Status: SHIPPED | OUTPATIENT
Start: 2024-07-16

## 2024-07-13 RX ORDER — 0.9 % SODIUM CHLORIDE 0.9 %
1000 INTRAVENOUS SOLUTION INTRAVENOUS ONCE
Status: COMPLETED | OUTPATIENT
Start: 2024-07-13 | End: 2024-07-13

## 2024-07-13 RX ADMIN — PANTOPRAZOLE SODIUM 40 MG: 40 INJECTION, POWDER, FOR SOLUTION INTRAVENOUS at 12:05

## 2024-07-13 RX ADMIN — SODIUM CHLORIDE 1000 ML: 9 INJECTION, SOLUTION INTRAVENOUS at 12:03

## 2024-07-13 RX ADMIN — FLUCONAZOLE 200 MG: 100 TABLET ORAL at 13:44

## 2024-07-13 ASSESSMENT — PAIN DESCRIPTION - ORIENTATION: ORIENTATION: OTHER (COMMENT)

## 2024-07-13 ASSESSMENT — PAIN SCALES - GENERAL: PAINLEVEL_OUTOF10: 8

## 2024-07-13 ASSESSMENT — LIFESTYLE VARIABLES
HOW OFTEN DO YOU HAVE A DRINK CONTAINING ALCOHOL: NEVER
HOW MANY STANDARD DRINKS CONTAINING ALCOHOL DO YOU HAVE ON A TYPICAL DAY: PATIENT DOES NOT DRINK

## 2024-07-13 ASSESSMENT — PAIN - FUNCTIONAL ASSESSMENT
PAIN_FUNCTIONAL_ASSESSMENT: PREVENTS OR INTERFERES SOME ACTIVE ACTIVITIES AND ADLS
PAIN_FUNCTIONAL_ASSESSMENT: 0-10

## 2024-07-13 ASSESSMENT — PAIN DESCRIPTION - LOCATION: LOCATION: OTHER (COMMENT)

## 2024-07-13 ASSESSMENT — PAIN DESCRIPTION - DESCRIPTORS: DESCRIPTORS: BURNING

## 2024-07-13 NOTE — ED TRIAGE NOTES
Pt sent from [facility] for abd pain for a long time, denies fever, +n/v, +dysuria, +dizziness unsure when it started, caregiver stated it has been one month of symptoms but burning got worse

## 2024-07-13 NOTE — DISCHARGE INSTRUCTIONS
You presented to ED from urgent care for some low blood pressures as well as concerns for UTI.  Blood pressure is much better here.  Physical Sid concerning for possible yeast infection.  Fluconazole given here.  If this cleared at the problems no further doses are necessary however if symptoms continue take other dose in 3 days as prescribed.  Urine here showed no signs of infection.  Follow-up with PCP.

## 2024-07-14 LAB
BACTERIA SPEC CULT: NORMAL
EKG ATRIAL RATE: 67 BPM
EKG DIAGNOSIS: NORMAL
EKG P AXIS: 3 DEGREES
EKG P-R INTERVAL: 114 MS
EKG Q-T INTERVAL: 428 MS
EKG QRS DURATION: 70 MS
EKG QTC CALCULATION (BAZETT): 452 MS
EKG R AXIS: -10 DEGREES
EKG T AXIS: 4 DEGREES
EKG VENTRICULAR RATE: 67 BPM
SERVICE CMNT-IMP: NORMAL

## 2024-08-05 ENCOUNTER — TELEPHONE (OUTPATIENT)
Age: 74
End: 2024-08-05

## 2024-08-05 RX ORDER — NYSTATIN 100000 U/G
CREAM TOPICAL 2 TIMES DAILY PRN
Qty: 30 G | Refills: 5 | Status: SHIPPED | OUTPATIENT
Start: 2024-08-05

## 2024-08-05 RX ORDER — POLYETHYLENE GLYCOL 3350 17 G/17G
POWDER, FOR SOLUTION ORAL
Qty: 510 G | Refills: 1 | Status: SHIPPED | OUTPATIENT
Start: 2024-08-05

## 2024-08-05 RX ORDER — MENTHOL 0 G/G
POWDER TOPICAL
Qty: 283 G | Refills: 0 | Status: SHIPPED | OUTPATIENT
Start: 2024-08-05

## 2024-08-05 NOTE — TELEPHONE ENCOUNTER
Medication Refill Request    Evelyn Olson is requesting a refill of the following medication(s):   Miralax  Gold bond   Nystatin cream  Please send refill to:   [facility] - [facility] [place] - [address] [place] [address] -  [phone] - F [phone]  [address] [place] [address] [place] [address]  Phone: [phone] Fax: [phone]

## 2024-08-05 NOTE — TELEPHONE ENCOUNTER
Chief Complaint   Patient presents with    Medication Refill     Last Appointment with Dr. Jorge:  5/28/2024   Future Appointments   Date Time Provider Department Center   9/25/2024  2:00 PM Casper Frank APRN - NP [facility] BS [facility]   11/13/2024  9:45 AM Denisha Jorge MD [facility] DEP

## 2024-08-05 NOTE — TELEPHONE ENCOUNTER
Reason for call:  TC from Tina Renteria, Staff Member. Staff Member on PHI. PHI checked twice. Pt id verified by two identifiers. Ms. Renteria states pt was seen at [facility] ER DEP on 06/17 and was given Ondansetron 4 MG Oral 3 times daily PRN, and on 07/13 was seen, again, at [facility] ER DEP and was given Fluconazole 150 MG Oral every 72 hours PRN. Ms. Renteria stated pt only took one Ondansetron tablet and only took one Fluconazole tablet. Ms. Renteria stated pt did not request another tablet of either medication. Ms. Renteria states she would like the medications to be discontinued because pt is  not using them, but they will need an order from Dr. Jorge for this to happen. Ms. Renteria stated if Dr. Jorge wanted something else done, they would need an order faxed. Ms. Renteria stated order needs to be faxed to fax number: [phone].    Is this a new problem: Yes    Date of last appointment:  6/27/2024     Can we respond via MyWerx: No    Best call back number: Tina Renteria/Staff Member/Phone Number: [phone]/Fax Number: [phone]

## 2024-09-25 ENCOUNTER — OFFICE VISIT (OUTPATIENT)
Age: 74
End: 2024-09-25
Payer: MEDICARE

## 2024-09-25 VITALS
OXYGEN SATURATION: 98 % | DIASTOLIC BLOOD PRESSURE: 76 MMHG | WEIGHT: 165 LBS | BODY MASS INDEX: 33.26 KG/M2 | SYSTOLIC BLOOD PRESSURE: 116 MMHG | HEART RATE: 75 BPM | RESPIRATION RATE: 18 BRPM | HEIGHT: 59 IN

## 2024-09-25 DIAGNOSIS — G30.0 MODERATE EARLY ONSET ALZHEIMER'S DEMENTIA WITH AGITATION (HCC): ICD-10-CM

## 2024-09-25 DIAGNOSIS — F02.B11 MODERATE EARLY ONSET ALZHEIMER'S DEMENTIA WITH AGITATION (HCC): ICD-10-CM

## 2024-09-25 DIAGNOSIS — R29.6 MULTIPLE FALLS: ICD-10-CM

## 2024-09-25 DIAGNOSIS — R26.81 UNSTEADY GAIT WHEN WALKING: ICD-10-CM

## 2024-09-25 DIAGNOSIS — F03.C0 SEVERE DEMENTIA WITHOUT BEHAVIORAL DISTURBANCE, PSYCHOTIC DISTURBANCE, MOOD DISTURBANCE, OR ANXIETY, UNSPECIFIED DEMENTIA TYPE (HCC): Primary | ICD-10-CM

## 2024-09-25 PROCEDURE — 1123F ACP DISCUSS/DSCN MKR DOCD: CPT

## 2024-09-25 PROCEDURE — G8399 PT W/DXA RESULTS DOCUMENT: HCPCS

## 2024-09-25 PROCEDURE — G8427 DOCREV CUR MEDS BY ELIG CLIN: HCPCS

## 2024-09-25 PROCEDURE — 1036F TOBACCO NON-USER: CPT

## 2024-09-25 PROCEDURE — 1090F PRES/ABSN URINE INCON ASSESS: CPT

## 2024-09-25 PROCEDURE — 99215 OFFICE O/P EST HI 40 MIN: CPT

## 2024-09-25 PROCEDURE — 3017F COLORECTAL CA SCREEN DOC REV: CPT

## 2024-09-25 PROCEDURE — G8417 CALC BMI ABV UP PARAM F/U: HCPCS

## 2024-09-25 RX ORDER — GALANTAMINE 4 MG/1
TABLET, FILM COATED ORAL
Qty: 180 TABLET | Refills: 5 | Status: SHIPPED | OUTPATIENT
Start: 2024-09-25

## 2024-09-25 ASSESSMENT — PATIENT HEALTH QUESTIONNAIRE - PHQ9
SUM OF ALL RESPONSES TO PHQ9 QUESTIONS 1 & 2: 0
SUM OF ALL RESPONSES TO PHQ QUESTIONS 1-9: 0
2. FEELING DOWN, DEPRESSED OR HOPELESS: NOT AT ALL
SUM OF ALL RESPONSES TO PHQ QUESTIONS 1-9: 0
1. LITTLE INTEREST OR PLEASURE IN DOING THINGS: NOT AT ALL

## 2024-09-25 ASSESSMENT — ENCOUNTER SYMPTOMS: PHOTOPHOBIA: 0

## 2024-10-09 RX ORDER — CALCIUM CARBONATE 500 MG/1
TABLET, CHEWABLE ORAL
Qty: 60 TABLET | Refills: 1 | Status: SHIPPED | OUTPATIENT
Start: 2024-10-09

## 2024-11-12 ENCOUNTER — TELEPHONE (OUTPATIENT)
Age: 74
End: 2024-11-12

## 2024-11-12 NOTE — TELEPHONE ENCOUNTER
Joseline, from [facility], called.  She stated that last night, the patient was given Quetiapine instead of Galantamine.  She was given Galantamine at 8:00 AM, this morning.  She would like to know if any further action is needed.    Joseline Billy - [phone]

## 2024-11-12 NOTE — TELEPHONE ENCOUNTER
Returned call to Joseline, from [facility] regarding pt not receiving PM dose of Galantamine. Staff member reported pt is fine, A&O, nothing different noted, but just wanted PCP to be informed of missed dose. Will forward to MD for review.

## 2024-11-13 ENCOUNTER — OFFICE VISIT (OUTPATIENT)
Age: 74
End: 2024-11-13
Payer: MEDICARE

## 2024-11-13 VITALS
OXYGEN SATURATION: 98 % | TEMPERATURE: 97.8 F | SYSTOLIC BLOOD PRESSURE: 115 MMHG | BODY MASS INDEX: 33.06 KG/M2 | RESPIRATION RATE: 16 BRPM | WEIGHT: 164 LBS | DIASTOLIC BLOOD PRESSURE: 72 MMHG | HEART RATE: 65 BPM | HEIGHT: 59 IN

## 2024-11-13 DIAGNOSIS — F02.B11 MODERATE EARLY ONSET ALZHEIMER'S DEMENTIA WITH AGITATION (HCC): ICD-10-CM

## 2024-11-13 DIAGNOSIS — M81.0 AGE-RELATED OSTEOPOROSIS WITHOUT CURRENT PATHOLOGICAL FRACTURE: ICD-10-CM

## 2024-11-13 DIAGNOSIS — E53.8 B12 DEFICIENCY: ICD-10-CM

## 2024-11-13 DIAGNOSIS — D69.6 THROMBOCYTOPENIA (HCC): ICD-10-CM

## 2024-11-13 DIAGNOSIS — K21.9 GASTROESOPHAGEAL REFLUX DISEASE WITHOUT ESOPHAGITIS: ICD-10-CM

## 2024-11-13 DIAGNOSIS — R73.09 ELEVATED GLUCOSE: Primary | ICD-10-CM

## 2024-11-13 DIAGNOSIS — F33.42 RECURRENT MAJOR DEPRESSIVE DISORDER, IN FULL REMISSION (HCC): ICD-10-CM

## 2024-11-13 DIAGNOSIS — F03.918: ICD-10-CM

## 2024-11-13 DIAGNOSIS — R73.09 ELEVATED GLUCOSE: ICD-10-CM

## 2024-11-13 DIAGNOSIS — E78.00 HYPERCHOLESTEROLEMIA: ICD-10-CM

## 2024-11-13 DIAGNOSIS — G30.0 MODERATE EARLY ONSET ALZHEIMER'S DEMENTIA WITH AGITATION (HCC): ICD-10-CM

## 2024-11-13 LAB
25(OH)D3 SERPL-MCNC: 52.7 NG/ML (ref 30–100)
ALBUMIN SERPL-MCNC: 3.3 G/DL (ref 3.5–5)
ALBUMIN/GLOB SERPL: 0.9 (ref 1.1–2.2)
ALP SERPL-CCNC: 99 U/L (ref 45–117)
ALT SERPL-CCNC: 31 U/L (ref 12–78)
ANION GAP SERPL CALC-SCNC: 6 MMOL/L (ref 2–12)
AST SERPL-CCNC: 20 U/L (ref 15–37)
BASOPHILS # BLD: 0 K/UL (ref 0–0.1)
BASOPHILS NFR BLD: 0 % (ref 0–1)
BILIRUB SERPL-MCNC: 0.5 MG/DL (ref 0.2–1)
BUN SERPL-MCNC: 17 MG/DL (ref 6–20)
BUN/CREAT SERPL: 20 (ref 12–20)
CALCIUM SERPL-MCNC: 8.6 MG/DL (ref 8.5–10.1)
CHLORIDE SERPL-SCNC: 108 MMOL/L (ref 97–108)
CO2 SERPL-SCNC: 26 MMOL/L (ref 21–32)
CREAT SERPL-MCNC: 0.84 MG/DL (ref 0.55–1.02)
DIFFERENTIAL METHOD BLD: ABNORMAL
EOSINOPHIL # BLD: 0.1 K/UL (ref 0–0.4)
EOSINOPHIL NFR BLD: 1 % (ref 0–7)
ERYTHROCYTE [DISTWIDTH] IN BLOOD BY AUTOMATED COUNT: 13.7 % (ref 11.5–14.5)
EST. AVERAGE GLUCOSE BLD GHB EST-MCNC: 100 MG/DL
GLOBULIN SER CALC-MCNC: 3.5 G/DL (ref 2–4)
GLUCOSE SERPL-MCNC: 102 MG/DL (ref 65–100)
HBA1C MFR BLD: 5.1 % (ref 4–5.6)
HCT VFR BLD AUTO: 39.8 % (ref 35–47)
HGB BLD-MCNC: 12.8 G/DL (ref 11.5–16)
IMM GRANULOCYTES # BLD AUTO: 0 K/UL (ref 0–0.04)
IMM GRANULOCYTES NFR BLD AUTO: 0 % (ref 0–0.5)
LYMPHOCYTES # BLD: 1.1 K/UL (ref 0.8–3.5)
LYMPHOCYTES NFR BLD: 22 % (ref 12–49)
MCH RBC QN AUTO: 29.7 PG (ref 26–34)
MCHC RBC AUTO-ENTMCNC: 32.2 G/DL (ref 30–36.5)
MCV RBC AUTO: 92.3 FL (ref 80–99)
MONOCYTES # BLD: 0.4 K/UL (ref 0–1)
MONOCYTES NFR BLD: 8 % (ref 5–13)
NEUTS SEG # BLD: 3.5 K/UL (ref 1.8–8)
NEUTS SEG NFR BLD: 69 % (ref 32–75)
NRBC # BLD: 0 K/UL (ref 0–0.01)
NRBC BLD-RTO: 0 PER 100 WBC
PLATELET # BLD AUTO: 121 K/UL (ref 150–400)
PMV BLD AUTO: 12.8 FL (ref 8.9–12.9)
POTASSIUM SERPL-SCNC: 4 MMOL/L (ref 3.5–5.1)
PROT SERPL-MCNC: 6.8 G/DL (ref 6.4–8.2)
RBC # BLD AUTO: 4.31 M/UL (ref 3.8–5.2)
SODIUM SERPL-SCNC: 140 MMOL/L (ref 136–145)
VIT B12 SERPL-MCNC: 1862 PG/ML (ref 193–986)
WBC # BLD AUTO: 5.1 K/UL (ref 3.6–11)

## 2024-11-13 PROCEDURE — 99214 OFFICE O/P EST MOD 30 MIN: CPT | Performed by: INTERNAL MEDICINE

## 2024-11-13 PROCEDURE — 90653 IIV ADJUVANT VACCINE IM: CPT | Performed by: INTERNAL MEDICINE

## 2024-11-13 SDOH — ECONOMIC STABILITY: FOOD INSECURITY: WITHIN THE PAST 12 MONTHS, THE FOOD YOU BOUGHT JUST DIDN'T LAST AND YOU DIDN'T HAVE MONEY TO GET MORE.: NEVER TRUE

## 2024-11-13 SDOH — ECONOMIC STABILITY: INCOME INSECURITY: HOW HARD IS IT FOR YOU TO PAY FOR THE VERY BASICS LIKE FOOD, HOUSING, MEDICAL CARE, AND HEATING?: NOT HARD AT ALL

## 2024-11-13 SDOH — ECONOMIC STABILITY: FOOD INSECURITY: WITHIN THE PAST 12 MONTHS, YOU WORRIED THAT YOUR FOOD WOULD RUN OUT BEFORE YOU GOT MONEY TO BUY MORE.: NEVER TRUE

## 2024-11-13 ASSESSMENT — PATIENT HEALTH QUESTIONNAIRE - PHQ9
SUM OF ALL RESPONSES TO PHQ QUESTIONS 1-9: 0
1. LITTLE INTEREST OR PLEASURE IN DOING THINGS: NOT AT ALL
2. FEELING DOWN, DEPRESSED OR HOPELESS: NOT AT ALL
SUM OF ALL RESPONSES TO PHQ QUESTIONS 1-9: 0
SUM OF ALL RESPONSES TO PHQ9 QUESTIONS 1 & 2: 0

## 2024-11-13 ASSESSMENT — ENCOUNTER SYMPTOMS
ABDOMINAL PAIN: 0
SHORTNESS OF BREATH: 0

## 2024-11-13 NOTE — PROGRESS NOTES
Evelyn Olson  is a 74 y.o.  female  who present for routine immunizations. Prior to vaccine administration: Consent was obtained. Risks and adverse reactions were discussed. The patient was provided the VIS and they were given an opportunity to ask questions; all questions were addressed. She  denies any symptoms, reactions or allergies that would exclude them from being immunized today.  There were no adverse reactions observed post vaccination.  Patient was advised to seek medical or call the office with any questions or concerns post vaccination. Patient verbalized understanding.  Eva Cordero MA  
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   Skin yeast infections controlled with topical prn.   Continues to see neurology regularly. Denies depression.   Also has complaint: no complaints.        Current Outpatient Medications   Medication Sig Dispense Refill    CALCIUM ANTACID 500 MG chewable tablet CHEW AND SWALLOW 1 TABLET BY MOUTH TWICE DAILY FOR CALCIUM SUPPLEMENT. 60 tablet 1    galantamine (RAZADYNE) 4 MG tablet TAKE 1 TABLET BY MOUTH TWICE DAILY FOR DEMENTIA 180 tablet 5    menthol-zinc oxide (GOLD BOND) powder APPLY TOPICALLY TO AFFECTED AREA(S) OF SKIN TWICE DAILY AS NEEDED FOR SKIN PROTECTION 283 g 0    polyethylene glycol (GLYCOLAX) 17 GM/SCOOP powder MIX 1 CAPFUL (17GMS) IN 8OZS OF LIQUID & DRINK DAILY AS NEEDED FOR CONSTIPATION. 510 g 1    nystatin (MYCOSTATIN) 190594 UNIT/GM cream Apply topically 2 times daily as needed (Rash) For rash under breasts or in gluteal fold 30 g 5    fluconazole (DIFLUCAN) 150 MG tablet Take 1 tablet by mouth every 72 hours as needed (Yeast infection) 3 tablet 0    vitamin D (ERGOCALCIFEROL) 1.25 MG (34495 UT) CAPS capsule REQUEST FOR NEW RX-TAKE 1 CAPSULE BY MOUTH ONCE WEEKLY ON FRIDAYS FOR VITD DEFICIENCY 12 capsule 3    Inulin 1.5 g CHEW chewable tablet 1 TABLET BY MOUTH TWICE DAILY FOR FIBER 60 tablet 11    cyanocobalamin 500 MCG tablet TAKE 2 TABLETS (1,000 MCG) BY MOUTH DAILY FOR SUPPLEMENT 180 tablet 0    esomeprazole (NEXIUM) 40 MG delayed release capsule REQUEST FOR NEW RX-TAKE 1 CAPSULE BY MOUTH ONCE DAILY FOR GERD. 90 capsule 0    ketoconazole (NIZORAL) 2 % cream

## 2024-11-15 ENCOUNTER — TELEPHONE (OUTPATIENT)
Age: 74
End: 2024-11-15

## 2024-11-15 NOTE — TELEPHONE ENCOUNTER
Reason for call:  TC from [facility]. Pt id verified by two identifiers. Blossom states she received a medication cancellation notice on Cyanocobalamin from Dr. Jorge. Blossom states she is calling for a confirmation on cancellation notice received. Blossom keita nurse can reach her directly at phone number: [phone].      Best call back number: Blossom/[facility]/Phone Number: [phone]

## 2024-12-10 ENCOUNTER — TELEPHONE (OUTPATIENT)
Age: 74
End: 2024-12-10

## 2024-12-10 RX ORDER — CALCIUM CARBONATE 500 MG/1
TABLET, CHEWABLE ORAL
Qty: 60 TABLET | Refills: 1 | Status: SHIPPED | OUTPATIENT
Start: 2024-12-10

## 2024-12-10 RX ORDER — ESOMEPRAZOLE MAGNESIUM 40 MG/1
CAPSULE, DELAYED RELEASE ORAL
Qty: 90 CAPSULE | Refills: 1 | Status: SHIPPED | OUTPATIENT
Start: 2024-12-10

## 2025-02-04 ENCOUNTER — OFFICE VISIT (OUTPATIENT)
Age: 75
End: 2025-02-04
Payer: MEDICARE

## 2025-02-04 ENCOUNTER — TELEPHONE (OUTPATIENT)
Age: 75
End: 2025-02-04

## 2025-02-04 VITALS
DIASTOLIC BLOOD PRESSURE: 67 MMHG | HEART RATE: 80 BPM | OXYGEN SATURATION: 96 % | TEMPERATURE: 98 F | BODY MASS INDEX: 33.47 KG/M2 | HEIGHT: 59 IN | RESPIRATION RATE: 16 BRPM | SYSTOLIC BLOOD PRESSURE: 113 MMHG | WEIGHT: 166 LBS

## 2025-02-04 DIAGNOSIS — G30.0 MODERATE EARLY ONSET ALZHEIMER'S DEMENTIA WITH AGITATION (HCC): ICD-10-CM

## 2025-02-04 DIAGNOSIS — R82.90 ABNORMAL URINALYSIS: ICD-10-CM

## 2025-02-04 DIAGNOSIS — F02.B11 MODERATE EARLY ONSET ALZHEIMER'S DEMENTIA WITH AGITATION (HCC): ICD-10-CM

## 2025-02-04 DIAGNOSIS — F03.918: ICD-10-CM

## 2025-02-04 DIAGNOSIS — F03.918: Primary | ICD-10-CM

## 2025-02-04 LAB
ALBUMIN SERPL-MCNC: 3.4 G/DL (ref 3.5–5)
ALBUMIN/GLOB SERPL: 1 (ref 1.1–2.2)
ALP SERPL-CCNC: 101 U/L (ref 45–117)
ALT SERPL-CCNC: 22 U/L (ref 12–78)
ANION GAP SERPL CALC-SCNC: 8 MMOL/L (ref 2–12)
AST SERPL-CCNC: 19 U/L (ref 15–37)
BASOPHILS # BLD: 0.03 K/UL (ref 0–0.1)
BASOPHILS NFR BLD: 0.6 % (ref 0–1)
BILIRUB SERPL-MCNC: 0.5 MG/DL (ref 0.2–1)
BILIRUBIN, URINE, POC: NEGATIVE
BLOOD URINE, POC: NEGATIVE
BUN SERPL-MCNC: 22 MG/DL (ref 6–20)
BUN/CREAT SERPL: 23 (ref 12–20)
CALCIUM SERPL-MCNC: 9.1 MG/DL (ref 8.5–10.1)
CHLORIDE SERPL-SCNC: 106 MMOL/L (ref 97–108)
CO2 SERPL-SCNC: 25 MMOL/L (ref 21–32)
CREAT SERPL-MCNC: 0.95 MG/DL (ref 0.55–1.02)
DIFFERENTIAL METHOD BLD: ABNORMAL
EOSINOPHIL # BLD: 0.12 K/UL (ref 0–0.4)
EOSINOPHIL NFR BLD: 2.3 % (ref 0–7)
ERYTHROCYTE [DISTWIDTH] IN BLOOD BY AUTOMATED COUNT: 13.4 % (ref 11.5–14.5)
GLOBULIN SER CALC-MCNC: 3.5 G/DL (ref 2–4)
GLUCOSE SERPL-MCNC: 100 MG/DL (ref 65–100)
GLUCOSE URINE, POC: NEGATIVE
HCT VFR BLD AUTO: 40.6 % (ref 35–47)
HGB BLD-MCNC: 13 G/DL (ref 11.5–16)
IMM GRANULOCYTES # BLD AUTO: 0.02 K/UL (ref 0–0.04)
IMM GRANULOCYTES NFR BLD AUTO: 0.4 % (ref 0–0.5)
KETONES, URINE, POC: NEGATIVE
LEUKOCYTE ESTERASE, URINE, POC: NORMAL
LYMPHOCYTES # BLD: 1.53 K/UL (ref 0.8–3.5)
LYMPHOCYTES NFR BLD: 29.3 % (ref 12–49)
MCH RBC QN AUTO: 29.9 PG (ref 26–34)
MCHC RBC AUTO-ENTMCNC: 32 G/DL (ref 30–36.5)
MCV RBC AUTO: 93.3 FL (ref 80–99)
MONOCYTES # BLD: 0.45 K/UL (ref 0–1)
MONOCYTES NFR BLD: 8.6 % (ref 5–13)
NEUTS SEG # BLD: 3.07 K/UL (ref 1.8–8)
NEUTS SEG NFR BLD: 58.8 % (ref 32–75)
NITRITE, URINE, POC: NEGATIVE
NRBC # BLD: 0 K/UL (ref 0–0.01)
NRBC BLD-RTO: 0 PER 100 WBC
PH, URINE, POC: 6 (ref 4.6–8)
PLATELET # BLD AUTO: 133 K/UL (ref 150–400)
POTASSIUM SERPL-SCNC: 4.1 MMOL/L (ref 3.5–5.1)
PROT SERPL-MCNC: 6.9 G/DL (ref 6.4–8.2)
PROTEIN,URINE, POC: NORMAL
RBC # BLD AUTO: 4.35 M/UL (ref 3.8–5.2)
SODIUM SERPL-SCNC: 139 MMOL/L (ref 136–145)
SPECIFIC GRAVITY, URINE, POC: 1.03 (ref 1–1.03)
URINALYSIS CLARITY, POC: CLEAR
URINALYSIS COLOR, POC: YELLOW
UROBILINOGEN, POC: NORMAL MG/DL
WBC # BLD AUTO: 5.2 K/UL (ref 3.6–11)

## 2025-02-04 PROCEDURE — G8399 PT W/DXA RESULTS DOCUMENT: HCPCS | Performed by: NURSE PRACTITIONER

## 2025-02-04 PROCEDURE — 1160F RVW MEDS BY RX/DR IN RCRD: CPT | Performed by: NURSE PRACTITIONER

## 2025-02-04 PROCEDURE — 1126F AMNT PAIN NOTED NONE PRSNT: CPT | Performed by: NURSE PRACTITIONER

## 2025-02-04 PROCEDURE — 1036F TOBACCO NON-USER: CPT | Performed by: NURSE PRACTITIONER

## 2025-02-04 PROCEDURE — PBSHW AMB POC URINALYSIS DIP STICK AUTO W/ MICRO: Performed by: NURSE PRACTITIONER

## 2025-02-04 PROCEDURE — 3017F COLORECTAL CA SCREEN DOC REV: CPT | Performed by: NURSE PRACTITIONER

## 2025-02-04 PROCEDURE — 81001 URINALYSIS AUTO W/SCOPE: CPT | Performed by: NURSE PRACTITIONER

## 2025-02-04 PROCEDURE — G8417 CALC BMI ABV UP PARAM F/U: HCPCS | Performed by: NURSE PRACTITIONER

## 2025-02-04 PROCEDURE — 1123F ACP DISCUSS/DSCN MKR DOCD: CPT | Performed by: NURSE PRACTITIONER

## 2025-02-04 PROCEDURE — 99213 OFFICE O/P EST LOW 20 MIN: CPT | Performed by: NURSE PRACTITIONER

## 2025-02-04 PROCEDURE — 1090F PRES/ABSN URINE INCON ASSESS: CPT | Performed by: NURSE PRACTITIONER

## 2025-02-04 PROCEDURE — G8427 DOCREV CUR MEDS BY ELIG CLIN: HCPCS | Performed by: NURSE PRACTITIONER

## 2025-02-04 PROCEDURE — 1159F MED LIST DOCD IN RCRD: CPT | Performed by: NURSE PRACTITIONER

## 2025-02-04 NOTE — TELEPHONE ENCOUNTER
Patient is in need of a sooner appt.     She is having aggression, very agitated.    Migdalia RUSS. Caregiver  [phone]    Please contact.

## 2025-02-04 NOTE — PROGRESS NOTES
Evelyn Olson (:  1950) is a 74 y.o. female,here for evaluation of the following chief complaint(s):  Dementia    /67   Pulse 80   Temp 98 °F (36.7 °C) (Temporal)   Resp 16   Ht 1.499 m (4' 11\")   Wt 75.3 kg (166 lb)   SpO2 96%   BMI 33.53 kg/m²       SUBJECTIVE/OBJECTIVE:    HPI:Patient presents with caretaker. She is pleasant today, but caretaker says over the past week she has become more aggressive and irritable.    Review of Systems   Psychiatric/Behavioral:  Positive for agitation and behavioral problems.        Physical Exam  Constitutional:       Appearance: Normal appearance.   Neurological:      Mental Status: She is alert. Mental status is at baseline.   Psychiatric:         Speech: Speech normal.         Behavior: Behavior is cooperative.         Cognition and Memory: Memory is impaired.      Comments: pleasant       Results for orders placed or performed in visit on 25   AMB POC URINALYSIS DIP STICK AUTO W/ MICRO   Result Value Ref Range    Color (UA POC) Yellow     Clarity (UA POC) Clear     Glucose, Urine, POC Negative     Bilirubin, Urine, POC Negative     Ketones, Urine, POC Negative     Specific Gravity, Urine, POC 1.030 1.001 - 1.035    Blood (UA POC) Negative     pH, Urine, POC 6 4.6 - 8.0    Protein, Urine, POC 1+     Urobilinogen, POC 1 mg/dL <1.1 mg/dL    Nitrite, Urine, POC Negative Negative    Leukocyte Esterase, Urine, POC Trace           ASSESSMENT/PLAN:  1. 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 Abnormal urinalysis  -

## 2025-02-05 LAB
BACTERIA SPEC CULT: NORMAL
SERVICE CMNT-IMP: NORMAL

## 2025-02-05 NOTE — TELEPHONE ENCOUNTER
Call placed to Migdalia.  2 patient identifiers received.  Advised TUSHAR Frank had an opening on 2/11/25 @ 3pm.  Accepted appointment.

## 2025-02-07 ENCOUNTER — TELEPHONE (OUTPATIENT)
Age: 75
End: 2025-02-07

## 2025-02-10 ENCOUNTER — TELEPHONE (OUTPATIENT)
Age: 75
End: 2025-02-10

## 2025-02-11 ENCOUNTER — TELEPHONE (OUTPATIENT)
Age: 75
End: 2025-02-11

## 2025-02-11 NOTE — TELEPHONE ENCOUNTER
Call placed to Ms Renteria.  LVM advising we would have to reschedule VV due to the fact that Ms Shruti Duarte is not on patient's HIPAA list.  Patient is in a group home and family member was not there.  Requested return call..

## 2025-03-03 ENCOUNTER — TRANSCRIBE ORDERS (OUTPATIENT)
Facility: HOSPITAL | Age: 75
End: 2025-03-03

## 2025-03-03 DIAGNOSIS — Z12.31 OTHER SCREENING MAMMOGRAM: Primary | ICD-10-CM

## 2025-03-03 RX ORDER — NYSTATIN 100000 [USP'U]/G
POWDER TOPICAL
Qty: 60 G | Refills: 0 | Status: SHIPPED | OUTPATIENT
Start: 2025-03-03

## 2025-03-26 RX ORDER — ACETAMINOPHEN 325 MG
TABLET ORAL
Qty: 60 TABLET | Refills: 1 | Status: SHIPPED | OUTPATIENT
Start: 2025-03-26

## 2025-04-21 RX ORDER — NYSTATIN 100000 [USP'U]/G
POWDER TOPICAL
Qty: 60 G | Refills: 0 | OUTPATIENT
Start: 2025-04-21

## 2025-05-14 RX ORDER — NYSTATIN 100000 [USP'U]/G
POWDER TOPICAL
Qty: 60 G | Refills: 3 | Status: SHIPPED | OUTPATIENT
Start: 2025-05-14

## 2025-05-27 ENCOUNTER — OFFICE VISIT (OUTPATIENT)
Age: 75
End: 2025-05-27
Payer: MEDICARE

## 2025-05-27 VITALS
HEART RATE: 62 BPM | OXYGEN SATURATION: 98 % | HEIGHT: 59 IN | BODY MASS INDEX: 33.47 KG/M2 | WEIGHT: 166 LBS | DIASTOLIC BLOOD PRESSURE: 78 MMHG | SYSTOLIC BLOOD PRESSURE: 118 MMHG | RESPIRATION RATE: 17 BRPM

## 2025-05-27 DIAGNOSIS — F03.C0 SEVERE DEMENTIA WITHOUT BEHAVIORAL DISTURBANCE, PSYCHOTIC DISTURBANCE, MOOD DISTURBANCE, OR ANXIETY, UNSPECIFIED DEMENTIA TYPE (HCC): ICD-10-CM

## 2025-05-27 PROCEDURE — 1123F ACP DISCUSS/DSCN MKR DOCD: CPT

## 2025-05-27 PROCEDURE — 1090F PRES/ABSN URINE INCON ASSESS: CPT

## 2025-05-27 PROCEDURE — 1126F AMNT PAIN NOTED NONE PRSNT: CPT

## 2025-05-27 PROCEDURE — 3017F COLORECTAL CA SCREEN DOC REV: CPT

## 2025-05-27 PROCEDURE — G8399 PT W/DXA RESULTS DOCUMENT: HCPCS

## 2025-05-27 PROCEDURE — 99215 OFFICE O/P EST HI 40 MIN: CPT

## 2025-05-27 PROCEDURE — 1036F TOBACCO NON-USER: CPT

## 2025-05-27 PROCEDURE — 1160F RVW MEDS BY RX/DR IN RCRD: CPT

## 2025-05-27 PROCEDURE — G8417 CALC BMI ABV UP PARAM F/U: HCPCS

## 2025-05-27 PROCEDURE — 1159F MED LIST DOCD IN RCRD: CPT

## 2025-05-27 PROCEDURE — G8427 DOCREV CUR MEDS BY ELIG CLIN: HCPCS

## 2025-05-27 RX ORDER — GALANTAMINE 4 MG/1
TABLET, FILM COATED ORAL
Qty: 180 TABLET | Refills: 5 | Status: SHIPPED | OUTPATIENT
Start: 2025-05-27

## 2025-05-27 ASSESSMENT — PATIENT HEALTH QUESTIONNAIRE - PHQ9
1. LITTLE INTEREST OR PLEASURE IN DOING THINGS: NOT AT ALL
SUM OF ALL RESPONSES TO PHQ QUESTIONS 1-9: 0
SUM OF ALL RESPONSES TO PHQ QUESTIONS 1-9: 0
2. FEELING DOWN, DEPRESSED OR HOPELESS: NOT AT ALL
SUM OF ALL RESPONSES TO PHQ QUESTIONS 1-9: 0
SUM OF ALL RESPONSES TO PHQ QUESTIONS 1-9: 0

## 2025-05-27 ASSESSMENT — VISUAL ACUITY: VA_NORMAL: 1

## 2025-05-27 NOTE — PROGRESS NOTES
Chief Complaint   Patient presents with    Follow-up    Memory Loss     dementia      Vitals:    05/27/25 1401   BP: 118/78   Pulse: 62   Resp: 17   SpO2: 98%

## 2025-05-27 NOTE — PROGRESS NOTES
Chief Complaint   Patient presents with    Follow-up    Memory Loss     dementia       HPI    Evelyn is a 74 year old female here for FU. Seen last on 9/25/24 for severe AS dementia. She did not tolerate donepezil or memantine. She is tolerating Galantamine 4 mg BID well. She lives at home where she receives 24 hour care. She is here today with her caregiver. She is reporting that she is doing well. Memory has been good and stable. Mood good, no aggression or anxiety. No hallucinations. Sleeping well, no dreams. Tolerating her meds without any side effects. They keep her busy at day center. She enjoys hanging out, watching tv, and puzzles. They help her with all her ADL's. No falls.               Last visit  Evelyn is a 74 year old female here for follow up. I saw her last on 11/6/23 for severe AD/dementia.She did not tolerate donepezil or memantine. She is tolerating Galantamine 4 mg BID well. She has 24 hour care at home and goes to a group center. She is here today with her care team. They are reporting frequent falls. They have moved her bedroom downstairs to avoid stairs. This has caused some confusion with finding her bedroom at times. No serious injures with her falls. She does require 24 hours care and help with all ADL's. Decreased appetitite and decreased water intake. She is sleeping good at night. Mood is good, no aggression. No hallucinations. Tolerating her medications without any side effects.     Review of Systems   Musculoskeletal:  Negative for gait problem.   Psychiatric/Behavioral:  Positive for confusion. Negative for agitation, behavioral problems, hallucinations and sleep disturbance. The patient is not nervous/anxious.          Past Medical History:   Diagnosis Date    Allergic rhinitis, cause unspecified 7/15/2010    Anxiety     Detached retina     Developmental delay 11/23/2015    Diverticulosis     Fracture     left wrist fx & surgery; pt fell     GERD (gastroesophageal reflux disease)  [facility]

## 2025-05-29 RX ORDER — ACETAMINOPHEN 325 MG
TABLET ORAL
Qty: 60 TABLET | Refills: 0 | Status: SHIPPED | OUTPATIENT
Start: 2025-05-29

## 2025-06-02 ENCOUNTER — TELEPHONE (OUTPATIENT)
Age: 75
End: 2025-06-02

## 2025-06-02 NOTE — TELEPHONE ENCOUNTER
Attempted to contact patient regarding upcoming Medicare wellness appointment and completion of HRA questionnaire. LVM for patient to please return call at  [phone], mcm sent as well.

## 2025-06-03 ENCOUNTER — TELEPHONE (OUTPATIENT)
Age: 75
End: 2025-06-03

## 2025-06-26 ENCOUNTER — TELEPHONE (OUTPATIENT)
Age: 75
End: 2025-06-26

## 2025-07-16 RX ORDER — ERGOCALCIFEROL 1.25 MG/1
CAPSULE, LIQUID FILLED ORAL
Qty: 12 CAPSULE | Refills: 1 | Status: SHIPPED | OUTPATIENT
Start: 2025-07-16

## 2025-07-17 RX ORDER — ERGOCALCIFEROL 1.25 MG/1
CAPSULE, LIQUID FILLED ORAL
Qty: 4 CAPSULE | Refills: 0 | OUTPATIENT
Start: 2025-07-17

## 2025-07-31 RX ORDER — ACETAMINOPHEN 325 MG
TABLET ORAL
Qty: 60 TABLET | Refills: 5 | Status: SHIPPED | OUTPATIENT
Start: 2025-07-31

## 2025-08-07 DIAGNOSIS — B37.2 YEAST INFECTION OF THE SKIN: ICD-10-CM

## 2025-08-07 RX ORDER — KETOCONAZOLE 20 MG/G
CREAM TOPICAL
Qty: 60 G | Refills: 0 | Status: SHIPPED | OUTPATIENT
Start: 2025-08-07

## 2025-08-20 ENCOUNTER — OFFICE VISIT (OUTPATIENT)
Age: 75
End: 2025-08-20
Payer: MEDICARE

## 2025-08-20 VITALS
TEMPERATURE: 97.1 F | RESPIRATION RATE: 16 BRPM | BODY MASS INDEX: 34.59 KG/M2 | WEIGHT: 176.2 LBS | SYSTOLIC BLOOD PRESSURE: 103 MMHG | HEIGHT: 60 IN | OXYGEN SATURATION: 95 % | DIASTOLIC BLOOD PRESSURE: 68 MMHG | HEART RATE: 64 BPM

## 2025-08-20 DIAGNOSIS — R51.9 ACUTE NONINTRACTABLE HEADACHE, UNSPECIFIED HEADACHE TYPE: ICD-10-CM

## 2025-08-20 DIAGNOSIS — F33.42 RECURRENT MAJOR DEPRESSIVE DISORDER, IN FULL REMISSION: ICD-10-CM

## 2025-08-20 DIAGNOSIS — Z00.00 MEDICARE ANNUAL WELLNESS VISIT, SUBSEQUENT: Primary | ICD-10-CM

## 2025-08-20 DIAGNOSIS — Z00.00 MEDICARE ANNUAL WELLNESS VISIT, SUBSEQUENT: ICD-10-CM

## 2025-08-20 DIAGNOSIS — F79 INTELLECTUAL DISABILITY: ICD-10-CM

## 2025-08-20 DIAGNOSIS — K21.9 GASTROESOPHAGEAL REFLUX DISEASE WITHOUT ESOPHAGITIS: ICD-10-CM

## 2025-08-20 DIAGNOSIS — G30.0 MODERATE EARLY ONSET ALZHEIMER'S DEMENTIA WITH AGITATION (HCC): ICD-10-CM

## 2025-08-20 DIAGNOSIS — E78.00 HYPERCHOLESTEROLEMIA: ICD-10-CM

## 2025-08-20 DIAGNOSIS — F02.B11 MODERATE EARLY ONSET ALZHEIMER'S DEMENTIA WITH AGITATION (HCC): ICD-10-CM

## 2025-08-20 LAB
ALBUMIN SERPL-MCNC: 3.5 G/DL (ref 3.5–5.2)
ALBUMIN/GLOB SERPL: 1.1 (ref 1.1–2.2)
ALP SERPL-CCNC: 99 U/L (ref 35–104)
ALT SERPL-CCNC: 19 U/L (ref 10–35)
ANION GAP SERPL CALC-SCNC: 8 MMOL/L (ref 2–14)
APPEARANCE UR: CLEAR
AST SERPL-CCNC: 19 U/L (ref 10–35)
BACTERIA URNS QL MICRO: ABNORMAL /HPF
BASOPHILS # BLD: 0.04 K/UL (ref 0–0.1)
BASOPHILS NFR BLD: 0.6 % (ref 0–1)
BILIRUB SERPL-MCNC: 0.3 MG/DL (ref 0–1.2)
BILIRUB UR QL: NEGATIVE
BUN SERPL-MCNC: 22 MG/DL (ref 8–23)
BUN/CREAT SERPL: 27 (ref 12–20)
CALCIUM SERPL-MCNC: 9.1 MG/DL (ref 8.8–10.2)
CHLORIDE SERPL-SCNC: 104 MMOL/L (ref 98–107)
CO2 SERPL-SCNC: 28 MMOL/L (ref 20–29)
COLOR UR: ABNORMAL
CREAT SERPL-MCNC: 0.81 MG/DL (ref 0.6–1)
DIFFERENTIAL METHOD BLD: ABNORMAL
EOSINOPHIL # BLD: 0.13 K/UL (ref 0–0.4)
EOSINOPHIL NFR BLD: 2.1 % (ref 0–7)
EPITH CASTS URNS QL MICRO: ABNORMAL /LPF
ERYTHROCYTE [DISTWIDTH] IN BLOOD BY AUTOMATED COUNT: 13.6 % (ref 11.5–14.5)
GLOBULIN SER CALC-MCNC: 3.1 G/DL (ref 2–4)
GLUCOSE SERPL-MCNC: 103 MG/DL (ref 65–100)
GLUCOSE UR STRIP.AUTO-MCNC: NEGATIVE MG/DL
HCT VFR BLD AUTO: 37.8 % (ref 35–47)
HGB BLD-MCNC: 12.3 G/DL (ref 11.5–16)
HGB UR QL STRIP: NEGATIVE
IMM GRANULOCYTES # BLD AUTO: 0.02 K/UL (ref 0–0.04)
IMM GRANULOCYTES NFR BLD AUTO: 0.3 % (ref 0–0.5)
KETONES UR QL STRIP.AUTO: NEGATIVE MG/DL
LEUKOCYTE ESTERASE UR QL STRIP.AUTO: ABNORMAL
LYMPHOCYTES # BLD: 1.27 K/UL (ref 0.8–3.5)
LYMPHOCYTES NFR BLD: 20.1 % (ref 12–49)
MCH RBC QN AUTO: 31.2 PG (ref 26–34)
MCHC RBC AUTO-ENTMCNC: 32.5 G/DL (ref 30–36.5)
MCV RBC AUTO: 95.9 FL (ref 80–99)
MONOCYTES # BLD: 0.46 K/UL (ref 0–1)
MONOCYTES NFR BLD: 7.3 % (ref 5–13)
NEUTS SEG # BLD: 4.4 K/UL (ref 1.8–8)
NEUTS SEG NFR BLD: 69.6 % (ref 32–75)
NITRITE UR QL STRIP.AUTO: NEGATIVE
NRBC # BLD: 0 K/UL (ref 0–0.01)
NRBC BLD-RTO: 0 PER 100 WBC
PH UR STRIP: 7 (ref 5–8)
PLATELET # BLD AUTO: 147 K/UL (ref 150–400)
PMV BLD AUTO: 12.4 FL (ref 8.9–12.9)
POTASSIUM SERPL-SCNC: 4.6 MMOL/L (ref 3.5–5.1)
PROT SERPL-MCNC: 6.6 G/DL (ref 6.4–8.3)
PROT UR STRIP-MCNC: NEGATIVE MG/DL
RBC # BLD AUTO: 3.94 M/UL (ref 3.8–5.2)
RBC #/AREA URNS HPF: ABNORMAL /HPF (ref 0–5)
SODIUM SERPL-SCNC: 140 MMOL/L (ref 136–145)
SP GR UR REFRACTOMETRY: 1.02 (ref 1–1.03)
URINE CULTURE IF INDICATED: ABNORMAL
UROBILINOGEN UR QL STRIP.AUTO: 1 EU/DL (ref 0.2–1)
WBC # BLD AUTO: 6.3 K/UL (ref 3.6–11)
WBC URNS QL MICRO: ABNORMAL /HPF (ref 0–4)

## 2025-08-20 PROCEDURE — 3017F COLORECTAL CA SCREEN DOC REV: CPT | Performed by: NURSE PRACTITIONER

## 2025-08-20 PROCEDURE — 1159F MED LIST DOCD IN RCRD: CPT | Performed by: NURSE PRACTITIONER

## 2025-08-20 PROCEDURE — G8399 PT W/DXA RESULTS DOCUMENT: HCPCS | Performed by: NURSE PRACTITIONER

## 2025-08-20 PROCEDURE — 1090F PRES/ABSN URINE INCON ASSESS: CPT | Performed by: NURSE PRACTITIONER

## 2025-08-20 PROCEDURE — G8417 CALC BMI ABV UP PARAM F/U: HCPCS | Performed by: NURSE PRACTITIONER

## 2025-08-20 PROCEDURE — 1036F TOBACCO NON-USER: CPT | Performed by: NURSE PRACTITIONER

## 2025-08-20 PROCEDURE — 1125F AMNT PAIN NOTED PAIN PRSNT: CPT | Performed by: NURSE PRACTITIONER

## 2025-08-20 PROCEDURE — 1160F RVW MEDS BY RX/DR IN RCRD: CPT | Performed by: NURSE PRACTITIONER

## 2025-08-20 PROCEDURE — G0439 PPPS, SUBSEQ VISIT: HCPCS | Performed by: NURSE PRACTITIONER

## 2025-08-20 PROCEDURE — 1123F ACP DISCUSS/DSCN MKR DOCD: CPT | Performed by: NURSE PRACTITIONER

## 2025-08-20 PROCEDURE — 99214 OFFICE O/P EST MOD 30 MIN: CPT | Performed by: NURSE PRACTITIONER

## 2025-08-20 PROCEDURE — G8427 DOCREV CUR MEDS BY ELIG CLIN: HCPCS | Performed by: NURSE PRACTITIONER

## 2025-08-20 RX ORDER — MUPIROCIN 2 %
OINTMENT (GRAM) TOPICAL
Qty: 30 G | Refills: 0 | Status: SHIPPED | OUTPATIENT
Start: 2025-08-20 | End: 2025-08-27

## 2025-08-20 SDOH — ECONOMIC STABILITY: FOOD INSECURITY: WITHIN THE PAST 12 MONTHS, YOU WORRIED THAT YOUR FOOD WOULD RUN OUT BEFORE YOU GOT MONEY TO BUY MORE.: NEVER TRUE

## 2025-08-20 SDOH — ECONOMIC STABILITY: FOOD INSECURITY: WITHIN THE PAST 12 MONTHS, THE FOOD YOU BOUGHT JUST DIDN'T LAST AND YOU DIDN'T HAVE MONEY TO GET MORE.: NEVER TRUE

## 2025-08-20 ASSESSMENT — PATIENT HEALTH QUESTIONNAIRE - PHQ9
SUM OF ALL RESPONSES TO PHQ QUESTIONS 1-9: 17
7. TROUBLE CONCENTRATING ON THINGS, SUCH AS READING THE NEWSPAPER OR WATCHING TELEVISION: NEARLY EVERY DAY
10. IF YOU CHECKED OFF ANY PROBLEMS, HOW DIFFICULT HAVE THESE PROBLEMS MADE IT FOR YOU TO DO YOUR WORK, TAKE CARE OF THINGS AT HOME, OR GET ALONG WITH OTHER PEOPLE: SOMEWHAT DIFFICULT
4. FEELING TIRED OR HAVING LITTLE ENERGY: NEARLY EVERY DAY
1. LITTLE INTEREST OR PLEASURE IN DOING THINGS: MORE THAN HALF THE DAYS
SUM OF ALL RESPONSES TO PHQ QUESTIONS 1-9: 17
SUM OF ALL RESPONSES TO PHQ QUESTIONS 1-9: 17
2. FEELING DOWN, DEPRESSED OR HOPELESS: SEVERAL DAYS
8. MOVING OR SPEAKING SO SLOWLY THAT OTHER PEOPLE COULD HAVE NOTICED. OR THE OPPOSITE, BEING SO FIGETY OR RESTLESS THAT YOU HAVE BEEN MOVING AROUND A LOT MORE THAN USUAL: SEVERAL DAYS
SUM OF ALL RESPONSES TO PHQ QUESTIONS 1-9: 17
5. POOR APPETITE OR OVEREATING: NEARLY EVERY DAY
9. THOUGHTS THAT YOU WOULD BE BETTER OFF DEAD, OR OF HURTING YOURSELF: NOT AT ALL
6. FEELING BAD ABOUT YOURSELF - OR THAT YOU ARE A FAILURE OR HAVE LET YOURSELF OR YOUR FAMILY DOWN: SEVERAL DAYS
3. TROUBLE FALLING OR STAYING ASLEEP: NEARLY EVERY DAY

## 2025-08-28 RX ORDER — POLYETHYLENE GLYCOL 3350 17 G/17G
POWDER, FOR SOLUTION ORAL
Qty: 510 G | Refills: 0 | OUTPATIENT
Start: 2025-08-28

## 2025-09-02 ENCOUNTER — TELEPHONE (OUTPATIENT)
Age: 75
End: 2025-09-02

## 2025-09-02 ENCOUNTER — HOSPITAL ENCOUNTER (EMERGENCY)
Facility: HOSPITAL | Age: 75
Discharge: HOME | End: 2025-09-02
Attending: EMERGENCY MEDICINE
Payer: MEDICARE

## 2025-09-02 ENCOUNTER — APPOINTMENT (OUTPATIENT)
Facility: HOSPITAL | Age: 75
End: 2025-09-02
Payer: MEDICARE

## 2025-09-02 VITALS
HEIGHT: 60 IN | WEIGHT: 175.93 LBS | DIASTOLIC BLOOD PRESSURE: 81 MMHG | BODY MASS INDEX: 34.54 KG/M2 | RESPIRATION RATE: 18 BRPM | TEMPERATURE: 98.5 F | OXYGEN SATURATION: 100 % | SYSTOLIC BLOOD PRESSURE: 148 MMHG | HEART RATE: 62 BPM

## 2025-09-02 DIAGNOSIS — H81.10 BENIGN PAROXYSMAL POSITIONAL VERTIGO, UNSPECIFIED LATERALITY: Primary | ICD-10-CM

## 2025-09-02 LAB
ALBUMIN SERPL-MCNC: 3.3 G/DL (ref 3.5–5.2)
ALBUMIN SERPL-MCNC: 3.3 G/DL (ref 3.5–5.2)
ALBUMIN/GLOB SERPL: 0.9 (ref 1.1–2.2)
ALBUMIN/GLOB SERPL: 0.9 (ref 1.1–2.2)
ALP SERPL-CCNC: 94 U/L (ref 35–104)
ALP SERPL-CCNC: 95 U/L (ref 35–104)
ALT SERPL-CCNC: 14 U/L (ref 10–35)
ALT SERPL-CCNC: 15 U/L (ref 10–35)
ANION GAP SERPL CALC-SCNC: 11 MMOL/L (ref 2–14)
ANION GAP SERPL CALC-SCNC: 9 MMOL/L (ref 2–14)
AST SERPL-CCNC: 21 U/L (ref 10–35)
AST SERPL-CCNC: 21 U/L (ref 10–35)
BASOPHILS # BLD: 0.04 K/UL (ref 0–0.1)
BASOPHILS NFR BLD: 0.6 % (ref 0–1)
BILIRUB SERPL-MCNC: 0.3 MG/DL (ref 0–1.2)
BILIRUB SERPL-MCNC: 0.3 MG/DL (ref 0–1.2)
BUN SERPL-MCNC: 26 MG/DL (ref 8–23)
BUN SERPL-MCNC: 26 MG/DL (ref 8–23)
BUN/CREAT SERPL: 29 (ref 12–20)
BUN/CREAT SERPL: 31 (ref 12–20)
CALCIUM SERPL-MCNC: 8.9 MG/DL (ref 8.8–10.2)
CALCIUM SERPL-MCNC: 9 MG/DL (ref 8.8–10.2)
CHLORIDE SERPL-SCNC: 103 MMOL/L (ref 98–107)
CHLORIDE SERPL-SCNC: 104 MMOL/L (ref 98–107)
CO2 SERPL-SCNC: 23 MMOL/L (ref 20–29)
CO2 SERPL-SCNC: 24 MMOL/L (ref 20–29)
COMMENT:: NORMAL
CREAT SERPL-MCNC: 0.85 MG/DL (ref 0.6–1)
CREAT SERPL-MCNC: 0.91 MG/DL (ref 0.6–1)
DIFFERENTIAL METHOD BLD: ABNORMAL
EKG ATRIAL RATE: 56 BPM
EKG DIAGNOSIS: NORMAL
EKG P AXIS: 39 DEGREES
EKG P-R INTERVAL: 140 MS
EKG Q-T INTERVAL: 454 MS
EKG QRS DURATION: 68 MS
EKG QTC CALCULATION (BAZETT): 438 MS
EKG R AXIS: 11 DEGREES
EKG T AXIS: 22 DEGREES
EKG VENTRICULAR RATE: 56 BPM
EOSINOPHIL # BLD: 0.1 K/UL (ref 0–0.4)
EOSINOPHIL NFR BLD: 1.5 % (ref 0–7)
ERYTHROCYTE [DISTWIDTH] IN BLOOD BY AUTOMATED COUNT: 13.4 % (ref 11.5–14.5)
GLOBULIN SER CALC-MCNC: 3.6 G/DL (ref 2–4)
GLOBULIN SER CALC-MCNC: 3.6 G/DL (ref 2–4)
GLUCOSE BLD STRIP.AUTO-MCNC: 127 MG/DL (ref 65–117)
GLUCOSE SERPL-MCNC: 126 MG/DL (ref 65–100)
GLUCOSE SERPL-MCNC: 126 MG/DL (ref 65–100)
HCT VFR BLD AUTO: 39.7 % (ref 35–47)
HGB BLD-MCNC: 13 G/DL (ref 11.5–16)
IMM GRANULOCYTES # BLD AUTO: 0.02 K/UL (ref 0–0.04)
IMM GRANULOCYTES NFR BLD AUTO: 0.3 % (ref 0–0.5)
INR PPP: 1 (ref 0.9–1.1)
LYMPHOCYTES # BLD: 1.52 K/UL (ref 0.8–3.5)
LYMPHOCYTES NFR BLD: 22.3 % (ref 12–49)
MCH RBC QN AUTO: 31 PG (ref 26–34)
MCHC RBC AUTO-ENTMCNC: 32.7 G/DL (ref 30–36.5)
MCV RBC AUTO: 94.7 FL (ref 80–99)
MONOCYTES # BLD: 0.5 K/UL (ref 0–1)
MONOCYTES NFR BLD: 7.3 % (ref 5–13)
NEUTS SEG # BLD: 4.64 K/UL (ref 1.8–8)
NEUTS SEG NFR BLD: 68 % (ref 32–75)
NRBC # BLD: 0 K/UL (ref 0–0.01)
NRBC BLD-RTO: 0 PER 100 WBC
PLATELET # BLD AUTO: 141 K/UL (ref 150–400)
PMV BLD AUTO: 11.6 FL (ref 8.9–12.9)
POTASSIUM SERPL-SCNC: 4.1 MMOL/L (ref 3.5–5.1)
POTASSIUM SERPL-SCNC: 4.2 MMOL/L (ref 3.5–5.1)
PROT SERPL-MCNC: 6.9 G/DL (ref 6.4–8.3)
PROT SERPL-MCNC: 6.9 G/DL (ref 6.4–8.3)
PROTHROMBIN TIME: 10.8 SEC (ref 9.2–11.2)
RBC # BLD AUTO: 4.19 M/UL (ref 3.8–5.2)
SERVICE CMNT-IMP: ABNORMAL
SODIUM SERPL-SCNC: 137 MMOL/L (ref 136–145)
SODIUM SERPL-SCNC: 138 MMOL/L (ref 136–145)
SPECIMEN HOLD: NORMAL
TROPONIN T SERPL HS-MCNC: 11.6 NG/L (ref 0–14)
WBC # BLD AUTO: 6.8 K/UL (ref 3.6–11)

## 2025-09-02 PROCEDURE — 84484 ASSAY OF TROPONIN QUANT: CPT

## 2025-09-02 PROCEDURE — 85610 PROTHROMBIN TIME: CPT

## 2025-09-02 PROCEDURE — 70498 CT ANGIOGRAPHY NECK: CPT

## 2025-09-02 PROCEDURE — 0042T CT BRAIN PERFUSION: CPT

## 2025-09-02 PROCEDURE — 80053 COMPREHEN METABOLIC PANEL: CPT

## 2025-09-02 PROCEDURE — 99284 EMERGENCY DEPT VISIT MOD MDM: CPT

## 2025-09-02 PROCEDURE — 85025 COMPLETE CBC W/AUTO DIFF WBC: CPT

## 2025-09-02 PROCEDURE — 6370000000 HC RX 637 (ALT 250 FOR IP): Performed by: EMERGENCY MEDICINE

## 2025-09-02 PROCEDURE — 70450 CT HEAD/BRAIN W/O DYE: CPT

## 2025-09-02 PROCEDURE — 2580000003 HC RX 258: Performed by: EMERGENCY MEDICINE

## 2025-09-02 PROCEDURE — 93010 ELECTROCARDIOGRAM REPORT: CPT | Performed by: SPECIALIST

## 2025-09-02 PROCEDURE — 82962 GLUCOSE BLOOD TEST: CPT

## 2025-09-02 PROCEDURE — 93005 ELECTROCARDIOGRAM TRACING: CPT | Performed by: EMERGENCY MEDICINE

## 2025-09-02 RX ORDER — 0.9 % SODIUM CHLORIDE 0.9 %
1000 INTRAVENOUS SOLUTION INTRAVENOUS ONCE
Status: COMPLETED | OUTPATIENT
Start: 2025-09-02 | End: 2025-09-02

## 2025-09-02 RX ORDER — IOPAMIDOL 755 MG/ML
100 INJECTION, SOLUTION INTRAVASCULAR
Status: DISCONTINUED | OUTPATIENT
Start: 2025-09-02 | End: 2025-09-02 | Stop reason: HOSPADM

## 2025-09-02 RX ORDER — MECLIZINE HYDROCHLORIDE 25 MG/1
25 TABLET ORAL 3 TIMES DAILY PRN
Qty: 15 TABLET | Refills: 0 | Status: SHIPPED | OUTPATIENT
Start: 2025-09-02 | End: 2025-09-12

## 2025-09-02 RX ORDER — MECLIZINE HCL 12.5 MG 12.5 MG/1
25 TABLET ORAL ONCE
Status: COMPLETED | OUTPATIENT
Start: 2025-09-02 | End: 2025-09-02

## 2025-09-02 RX ADMIN — MECLIZINE 25 MG: 12.5 TABLET ORAL at 10:04

## 2025-09-02 RX ADMIN — SODIUM CHLORIDE 1000 ML: 0.9 INJECTION, SOLUTION INTRAVENOUS at 10:04

## 2025-09-02 ASSESSMENT — PAIN SCALES - GENERAL
PAINLEVEL_OUTOF10: 0
PAINLEVEL_OUTOF10: 0

## 2025-09-02 ASSESSMENT — PAIN - FUNCTIONAL ASSESSMENT: PAIN_FUNCTIONAL_ASSESSMENT: 0-10
